# Patient Record
Sex: FEMALE | Race: WHITE | Employment: FULL TIME | URBAN - METROPOLITAN AREA
[De-identification: names, ages, dates, MRNs, and addresses within clinical notes are randomized per-mention and may not be internally consistent; named-entity substitution may affect disease eponyms.]

---

## 2017-11-15 ENCOUNTER — ALLSCRIPTS OFFICE VISIT (OUTPATIENT)
Dept: OTHER | Facility: OTHER | Age: 62
End: 2017-11-15

## 2017-11-15 ENCOUNTER — GENERIC CONVERSION - ENCOUNTER (OUTPATIENT)
Dept: OTHER | Facility: OTHER | Age: 62
End: 2017-11-15

## 2017-11-16 NOTE — PROGRESS NOTES
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)   2  Screening for skin condition (V82 0) (Z13 89)   3  Seborrheic keratosis (702 19) (L82 1)   4  Multiple excoriations (919 8) (T07  Levi Phan)    Plan     · Wound care as instructed ; Status:Complete;   Done: [id number]    Discussion/Summary  Discussion Summary- [facility]:  Assessment #1: Notalgia paresthetica  Care Plan:  We again discussed this concept no treatment indicated  Assessment #2: Multiple excoriations  Care Plan:  Nothing real remarkable for discussed the use moisturizes mild soaps avoidance of irritants nothing of concern noted except the area on the left breast if this does not resolve would have the gynecologist make sure nothing else is going on  Assessment #3: Seborrheic keratosis  Care Plan:  Patient reassured these are normal growths we acquire with age no treatment needed  Assessment #4: Screening for dermatologic disorders  Care Plan:  Nothing else of concern noted exam follow-up in a year  Chief Complaint  Chief Complaint Free Text Note Form: One year full body evaluation, with concerns of what appears to the patient as spider bites as she indicated once she scratches them they bleed  History of Present Illness  HPI: 58-year-old female presents for overall skin check numerous different cutaneous concerns including areas on her hand and back and foot that itches also in the nape of the neck  Also concerned regarding a little spot she noted on her left breast that seems to be getting better  Patient was concerned regarding several spots that she may have scratched very gently a rubbed in the started bleeding  We also discussed the itching on her back a couple years ago but she does not recall this      Review of Systems  [facility] [address] [place] [address] [address] Patient:  Constitutional: Denies constitutional symptoms  Eyes: Denies eye symptoms  ENT:  denies ear symptoms, nasal symptoms, mouth or throat symptoms  Cardiovascular: Denies cardiovascular symptoms  Respiratory: Denies respiratory symptoms  Gastrointestinal: Denies gastrointestinal symptoms  Musculoskeletal: Denies musculoskeletal symptoms  Integumentary: Denies skin, hair and nail symptoms  Neurological: Denies neurologic symptoms  Psychiatric: Denies psychiatric symptoms  Endocrine: Denies endocrine symptoms  Hematologic/Lymphatic: Denies hematologic symptoms  Active Problems    1  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)   2  Screening for skin condition (V82 0) (Z13 89)   3  Seborrheic keratosis (702 19) (L82 1)    Past Medical History  Past Medical History Reviewed- Derm:   The past medical history was reviewed  Surgical History  Surgical History Reviewed [facility]- Derm:   Surgical History reviewed      Family History  Family History Reviewed- Derm:   Family History was reviewed      Social History     · Former smoker (T17 27) (D70 213)  Social History Reviewed [facility]- Derm: The social history was reviewed      Current Meds   1  Calcium 500 MG CAPS; Therapy: (Recorded:[id number]) to Recorded   2  Risedronate Sodium TABS; Therapy: (Recorded:[id number]) to Recorded   3  Vitamin D3 CAPS; Therapy: (Recorded:[id number]) to Recorded  Medication List Reviewed: The medication list was reviewed and updated today  Allergies    1  No Known Drug Allergies    Physical Exam   Constitutional  General appearance: Appears healthy and well developed  Lymphatic  No visible disturbance  Musculoskeletal  Digits and nails: No clubbing, cyanosis or edema  Cutaneous and nail exam normal    Skin  Scalp skin texture and hair distribution: Normal skin texture on scalp, normal hair distribution  Head: Normal turgor, no rashes, no lesions  Neck: Abnormal    Chest: Normal turgor, no rashes, no lesions  Abdomen: Normal turgor, no rashes, no lesions  Back: Normal turgor, no rashes, no lesions  Right upper extremity: Normal turgor, no rashes, no lesions  Left upper extremity: Normal turgor, no rashes, no lesions  Right lower extremity: Normal turgor, no rashes, no lesions  Left lower extremity: Normal turgor, no rashes, no lesions  Neuro/Psych  Alert and oriented x 3  Displays comfort and cooperation during encounterl  Affect is normal    Finding Small crusted area noted on the right dorsum of the foot also on the right hand no rash noted on the back nape of the neck some scaling erythema noted also slightly inflammatory area noted on the left breast normal keratotic papules with greasy stuck on appearance nothing else remarkable noted on exam       Future Appointments    Date/Time Provider Specialty Site   11/19/2018 08:45 AM SINDI Akbar   Dermatology [facility]       Signatures   Electronically signed by : SINDI Short ; Nov 15 2017  9:05AM EST                       (Author)

## 2018-09-19 ENCOUNTER — HOSPITAL ENCOUNTER (EMERGENCY)
Facility: HOSPITAL | Age: 63
Discharge: HOME | End: 2018-09-19
Attending: EMERGENCY MEDICINE | Admitting: EMERGENCY MEDICINE
Payer: COMMERCIAL

## 2018-09-19 VITALS
SYSTOLIC BLOOD PRESSURE: 148 MMHG | OXYGEN SATURATION: 97 % | DIASTOLIC BLOOD PRESSURE: 92 MMHG | HEIGHT: 63 IN | HEART RATE: 92 BPM | WEIGHT: 115 LBS | BODY MASS INDEX: 20.38 KG/M2 | RESPIRATION RATE: 19 BRPM

## 2018-09-19 DIAGNOSIS — L73.9 FOLLICULITIS: Primary | ICD-10-CM

## 2018-09-19 LAB
BACTERIA UR QL AUTO: ABNORMAL /HPF
BILIRUB UR QL STRIP: NEGATIVE
CLARITY UR: CLEAR
COLOR UR: YELLOW
GLUCOSE UR STRIP-MCNC: NEGATIVE MG/DL
HGB UR QL STRIP.AUTO: ABNORMAL
KETONES UR STRIP-MCNC: ABNORMAL MG/DL
LEUKOCYTE ESTERASE UR QL STRIP: ABNORMAL
NITRITE UR QL STRIP: NEGATIVE
NON-SQ EPI CELLS URNS QL MICRO: ABNORMAL /HPF
PH UR STRIP.AUTO: 5.5 [PH] (ref 4.5–8)
PROT UR STRIP-MCNC: NEGATIVE MG/DL
RBC #/AREA URNS AUTO: ABNORMAL /HPF
SP GR UR STRIP.AUTO: 1.02 (ref 1–1.03)
UROBILINOGEN UR QL STRIP.AUTO: 0.2 E.U./DL
WBC #/AREA URNS AUTO: ABNORMAL /HPF

## 2018-09-19 PROCEDURE — 81001 URINALYSIS AUTO W/SCOPE: CPT | Performed by: EMERGENCY MEDICINE

## 2018-09-19 PROCEDURE — 99284 EMERGENCY DEPT VISIT MOD MDM: CPT

## 2018-09-19 NOTE — ED PROVIDER NOTES
History  Chief Complaint   Patient presents with    Vaginal Bleeding     Pt reports possible vaginal bleeding, in which pt noticed last night when using the restroom  76-year-old female presents today with concerns for possible vaginal bleeding which occurred last night  States she went to the bathroom and noticed there was some blood on her underwear  Unsure where it is coming from or what happened  Has no pain  Does report urinary frequency  He denies abdominal pain, bloating, or weight loss  History provided by:  Patient  Vaginal Bleeding   Quality:  Spotting (One spot of blood yesterday)  Severity:  Mild  Onset quality:  Sudden  Duration: Only occurred once yesterday  Progression:  Resolved  Chronicity:  New  Menstrual history:  Postmenopausal  Possible pregnancy: no    Context: spontaneously    Relieved by:  None tried  Worsened by:  Nothing  Ineffective treatments:  None tried  Associated symptoms: no abdominal pain, no back pain, no dizziness, no dysuria, no fever and no vaginal discharge        None       History reviewed  No pertinent past medical history  History reviewed  No pertinent surgical history  History reviewed  No pertinent family history  I have reviewed and agree with the history as documented  Social History   Substance Use Topics    Smoking status: Former Smoker    Smokeless tobacco: Never Used    Alcohol use 1 2 oz/week     2 Cans of beer per week      Comment: daily        Review of Systems   Constitutional: Negative for chills, fever and unexpected weight change  Gastrointestinal: Negative for abdominal pain  Genitourinary: Positive for vaginal bleeding  Negative for dysuria and vaginal discharge  Musculoskeletal: Negative for back pain  Skin: Negative for pallor, rash and wound  Neurological: Negative for dizziness and headaches  Physical Exam  Physical Exam   Constitutional: She is oriented to person, place, and time   She appears well-developed and well-nourished  HENT:   Head: Normocephalic and atraumatic  Eyes: EOM are normal  Pupils are equal, round, and reactive to light  Neck: Normal range of motion  Neck supple  Cardiovascular: Normal rate and regular rhythm  Pulmonary/Chest: Effort normal and breath sounds normal    Abdominal: There is tenderness  Genitourinary: Vagina normal and uterus normal        Pelvic exam was performed with patient prone  Uterus is not enlarged  Right adnexum displays no mass, no tenderness and no fullness  Left adnexum displays no mass, no tenderness and no fullness  No erythema, tenderness or bleeding in the vagina  No foreign body in the vagina  No signs of injury around the vagina  No vaginal discharge found  Genitourinary Comments: Small folliculitis left labia majora  Patient states she popped it yesterday  Neurological: She is alert and oriented to person, place, and time  Skin: Skin is warm and dry  Capillary refill takes less than 2 seconds  Psychiatric: She has a normal mood and affect  Nursing note and vitals reviewed        Vital Signs  ED Triage Vitals [09/19/18 1812]   Temp Pulse Respirations Blood Pressure SpO2   -- 92 19 148/92 97 %      Temp src Heart Rate Source Patient Position - Orthostatic VS BP Location FiO2 (%)   -- Monitor Sitting Right arm --      Pain Score       No Pain           Vitals:    09/19/18 1812   BP: 148/92   Pulse: 92   Patient Position - Orthostatic VS: Sitting       Visual Acuity      ED Medications  Medications - No data to display    Diagnostic Studies  Results Reviewed     Procedure Component Value Units Date/Time    Urine Microscopic [[id number]]  (Abnormal) Collected:  09/19/18 1920    Lab Status:  Final result Specimen:  Urine from Urine, Clean Catch Updated:  09/19/18 1931     RBC, UA 0-1 (A) /hpf      WBC, UA 1-2 (A) /hpf      Epithelial Cells Occasional /hpf      Bacteria, UA None Seen /hpf     UA w Reflex to Microscopic [[id number]] (Abnormal) Collected:  09/19/18 1920    Lab Status:  Final result Specimen:  Urine from Urine, Clean Catch Updated:  09/19/18 1925     Color, UA Yellow     Clarity, UA Clear     Specific Gravity, UA 1 025     pH, UA 5 5     Leukocytes, UA Trace (A)     Nitrite, UA Negative     Protein, UA Negative mg/dl      Glucose, UA Negative mg/dl      Ketones, UA 15 (1+) (A) mg/dl      Urobilinogen, UA 0 2 E U /dl      Bilirubin, UA Negative     Blood, UA Small (A)                 No orders to display              Procedures  Procedures       Phone Contacts  ED Phone Contact    ED Course                               MDM  Number of Diagnoses or Management Options  Folliculitis: new and requires workup  Diagnosis management comments: Chief complaint is likely related to a single folliculitis on the left labia majora that the patient popped  Internal pelvic exam is entirely normal  Urine showed trace leukocytes and trace blood  Will send for culture  Recommend follow-up with OBGYN and PCP as an outpatient  Amount and/or Complexity of Data Reviewed  Clinical lab tests: ordered and reviewed    Risk of Complications, Morbidity, and/or Mortality  Presenting problems: moderate  Diagnostic procedures: low  Management options: low    Patient Progress  Patient progress: stable    CritCare Time    Disposition  Final diagnoses: Folliculitis     Time reflects when diagnosis was documented in both MDM as applicable and the Disposition within this note     Time User Action Codes Description Comment    9/19/2018  7:47 PM oCrry Coats Add [T21 1] Folliculitis       ED Disposition     ED Disposition Condition Comment    Discharge  Hansel Jovon discharge to home/self care      Condition at discharge: Stable        Follow-up Information     Follow up With Specialties Details Why Keesha Bess MD Obstetrics and Gynecology, Obstetrics, Gynecology Schedule an appointment as soon as possible for a visit  [address] [place] [address] [facility]  [address] [place] [address] Patricia Licea MD Internal Medicine Call to get a family doctor [address]  [phone]            There are no discharge medications for this patient  No discharge procedures on file      ED Provider  Electronically Signed by           Stacey Pope DO  09/19/18 2009

## 2018-09-19 NOTE — DISCHARGE INSTRUCTIONS
Folliculitis   WHAT YOU NEED TO KNOW:   Folliculitis is inflammation of your hair follicles  A hair follicle is a sac under your skin  Your hair grows out of the follicle  Folliculitis is caused by bacteria or fungus, most commonly a germ called Staph  Folliculitis can occur anywhere you have hair  DISCHARGE INSTRUCTIONS:   Medicines:   · Antibiotics: This medicine is given to fight or prevent an infection caused by bacteria  It may be given as an ointment that you apply to your skin or as a pill  Always take your antibiotics exactly as ordered by your healthcare provider  Never save antibiotics or take leftover antibiotics that were given to you for another illness  · Antifungal medicine: This medicine helps kill fungus that may be causing your folliculitis  It may be given as an cream that you apply to your skin or as a pill  · NSAIDs , such as ibuprofen, help decrease swelling, pain, and fever  This medicine is available with or without a doctor's order  NSAIDs can cause stomach bleeding or kidney problems in certain people  If you take blood thinner medicine, always ask if NSAIDs are safe for you  Always read the medicine label and follow directions  Do not give these medicines to children under 10months of age without direction from your child's healthcare provider  · Antihistamines: This medicine may be given to help decrease itching  · Take your medicine as directed  Contact your healthcare provider if you think your medicine is not helping or if you have side effects  Tell him of her if you are allergic to any medicine  Keep a list of the medicines, vitamins, and herbs you take  Include the amounts, and when and why you take them  Bring the list or the pill bottles to follow-up visits  Carry your medicine list with you in case of an emergency    Follow up with your healthcare provider or dermatologist as directed:  Write down your questions so you remember to ask them during your visits  Manage folliculitis:   · Use warm compresses:  Wet a washcloth with warm water and apply it to the infected skin area to help decrease pain and swelling  Warm compresses may also help drain pus and improve healing  · Clean the area:  Use antibacterial soap to wash the affected area  Change your washcloths and towels every day  · Avoid shaving the area: If possible, do not shave areas that have folliculitis  If you must shave, use an electric razor or new blade every time you shave  Prevent folliculitis:   · Do not share personal items:  Do not share towels, soap, or any personal items with other people  · Do not wear tight clothing:  Do not wear tight-fitting clothes that rub against and irritate your skin  · Treat skin injuries right away:  Treat injuries such as cuts and scrapes right away  Wash them with warm, soapy water, and cover the area to prevent infection  Contact your healthcare provider or dermatologist if:  · You have a fever  · You have foul-smelling pus coming from the bumps on your skin  · Your rash is spreading  · You have questions or concerns about your condition or care  Return to the emergency department if:  · You develop large areas of red, warm, tender skin around the folliculitis  · You develop boils  © 2017 [address]  Information is for End User's use only and may not be sold, redistributed or otherwise used for commercial purposes  All illustrations and images included in CareNotes® are the copyrighted property of A D A M , Inc  or [employer]  The above information is an  only  It is not intended as medical advice for individual conditions or treatments  Talk to your doctor, nurse or pharmacist before following any medical regimen to see if it is safe and effective for you

## 2019-02-11 ENCOUNTER — TELEPHONE (OUTPATIENT)
Dept: GASTROENTEROLOGY | Facility: CLINIC | Age: 64
End: 2019-02-11

## 2019-02-11 NOTE — TELEPHONE ENCOUNTER
Dr Swati Becerra, Patient called to check when her last colonoscopy was performed    Please call [phone]

## 2019-03-01 ENCOUNTER — OFFICE VISIT (OUTPATIENT)
Dept: GASTROENTEROLOGY | Facility: CLINIC | Age: 64
End: 2019-03-01
Payer: COMMERCIAL

## 2019-03-01 ENCOUNTER — TELEPHONE (OUTPATIENT)
Dept: GASTROENTEROLOGY | Facility: CLINIC | Age: 64
End: 2019-03-01

## 2019-03-01 VITALS
DIASTOLIC BLOOD PRESSURE: 78 MMHG | HEART RATE: 75 BPM | SYSTOLIC BLOOD PRESSURE: 132 MMHG | BODY MASS INDEX: 21.08 KG/M2 | WEIGHT: 119 LBS

## 2019-03-01 DIAGNOSIS — Z86.010 HISTORY OF COLONIC POLYPS: ICD-10-CM

## 2019-03-01 DIAGNOSIS — R19.5 NONSPECIFIC ABNORMAL FINDING IN STOOL CONTENTS: Primary | ICD-10-CM

## 2019-03-01 PROCEDURE — 99214 OFFICE O/P EST MOD 30 MIN: CPT | Performed by: INTERNAL MEDICINE

## 2019-03-01 RX ORDER — BIOTIN 1 MG
TABLET ORAL
COMMUNITY

## 2019-03-01 RX ORDER — PHENOL 1.4 %
600 AEROSOL, SPRAY (ML) MUCOUS MEMBRANE 2 TIMES DAILY WITH MEALS
COMMUNITY

## 2019-03-01 RX ORDER — GLUC/MSM/COLGN2/HYAL/ANTIARTH3 375-375-20
1 TABLET ORAL
COMMUNITY
End: 2019-03-01

## 2019-03-01 RX ORDER — FOLIC ACID 1 MG/1
TABLET ORAL DAILY
COMMUNITY

## 2019-03-01 NOTE — PROGRESS NOTES
[facility] - Outpatient Follow-up Note  Eliseo Smith 58 y o  female MRN: [id number]  Encounter: [id number]          ASSESSMENT AND PLAN:      1  Nonspecific abnormal finding in stool contents  - It was explained to her today that mucus in the stool is a normal variant for most patients  2  History of colonic polyps  - Will start high fiber diet  - Will start fiber supplement  - Will start daily probiotic  - Will hold on colonoscopy for now; she is due for repeat colonoscopy 2020    ______________________________________________________________________    SUBJECTIVE:    58year old female who is here with mucus in her stool  She reports that this has been present for the past several weeks  She reports that her stool consistency has not been her "normal"  She reports that she has not changed her diet  She denies melena or RB  Her weight has been stable  She had a colonoscopy in 2017 and a tubular adenoma removed  She denies abdominal pain  She denies nausea or vomiting  REVIEW OF SYSTEMS IS OTHERWISE NEGATIVE        Historical Information   Past Medical History:   Diagnosis Date    Medical history reviewed with no changes      Past Surgical History:   Procedure Laterality Date    COLONOSCOPY      2017     Social History   Social History     Substance and Sexual Activity   Alcohol Use Yes    Alcohol/week: 1 2 oz    Types: 2 Cans of beer per week    Comment: daily     Social History     Substance and Sexual Activity   Drug Use No     Social History     Tobacco Use   Smoking Status Former Smoker   Smokeless Tobacco Never Used     Family History   Problem Relation Age of Onset    Dementia Mother     Diabetes Father        Meds/Allergies       Current Outpatient Medications:     calcium carbonate (OS-CARTER) 600 MG tablet    Cholecalciferol (VITAMIN D3) 1000 units CAPS    folic acid (FOLVITE) 1 mg tablet    No Known Allergies        Objective     Blood pressure 132/78, pulse 75, weight 54 kg (119 lb)  Body mass index is 21 08 kg/m²  PHYSICAL EXAM:      General Appearance:   Alert, cooperative, no distress   HEENT:   Normocephalic, atraumatic, anicteric      Neck:  Supple, symmetrical, trachea midline   Lungs:   Clear to auscultation bilaterally; no rales, rhonchi or wheezing; respirations unlabored    Heart[de-identified]   Regular rate and rhythm; no murmur, rub, or gallop  Abdomen:   Soft, non-tender, non-distended; normal bowel sounds; no masses, no organomegaly    Genitalia:   Deferred    Rectal:   Deferred    Extremities:  No cyanosis, clubbing or edema    Pulses:  2+ and symmetric    Skin:  No jaundice, rashes, or lesions    Lymph nodes:  No palpable cervical lymphadenopathy        Lab Results:   No visits with results within 1 Day(s) from this visit  Latest known visit with results is:   Admission on 09/19/2018, Discharged on 09/19/2018   Component Date Value    Color, UA 09/19/2018 Yellow     Clarity, UA 09/19/2018 Clear     Specific Gravity, UA 09/19/2018 1 025     pH, UA 09/19/2018 5 5     Leukocytes, UA 09/19/2018 Trace*    Nitrite, UA 09/19/2018 Negative     Protein, UA 09/19/2018 Negative     Glucose, UA 09/19/2018 Negative     Ketones, UA 09/19/2018 15 (1+)*    Urobilinogen, UA 09/19/2018 0 2     Bilirubin, UA 09/19/2018 Negative     Blood, UA 09/19/2018 Small*    RBC, UA 09/19/2018 0-1*    WBC, UA 09/19/2018 1-2*    Epithelial Cells 09/19/2018 Occasional     Bacteria, UA 09/19/2018 None Seen          Radiology Results:   No results found

## 2019-03-01 NOTE — PATIENT INSTRUCTIONS
Colorectal Polyps   WHAT YOU NEED TO KNOW:   Colorectal polyps are small growths of tissue in the lining of the colon and rectum  Most polyps are hyperplastic polyps and are usually benign (noncancerous)  Certain types of polyps, called adenomatous polyps, may turn into cancer  DISCHARGE INSTRUCTIONS:   Follow up with your healthcare provider or gastroenterologist as directed: You may need to return for more tests, such as another colonoscopy  Write down your questions so you remember to ask them during your visits  Reduce your risk for colorectal polyps:   · Eat a variety of healthy foods:  Healthy foods include fruit, vegetables, whole-grain breads, low-fat dairy products, beans, lean meat, and fish  Ask if you need to be on a special diet  · Maintain a healthy weight:  Ask your healthcare provider if you need to lose weight and how much you need to lose  Ask for help with a weight loss program     · Exercise:  Begin to exercise slowly and do more as you get stronger  Talk with your healthcare provider before you start an exercise program      · Limit alcohol:  Your risk for polyps increases the more you drink  · Do not smoke: If you smoke, it is never too late to quit  Ask for information about how to stop  For support and more information:   · Michael Garcia ([facility])  [address], [place] [address]  Phone: 8- 210 - [phone]  Web Address: www digestive  niddk nih gov  Contact your healthcare provider or gastroenterologist if:   · You have a fever  · You have chills, a cough, or feel weak and achy  · You have abdominal pain that does not go away or gets worse after you take medicine  · Your abdomen is swollen  · You are losing weight without trying  · You have questions or concerns about your condition or care  Seek care immediately or call 911 if:   · You have sudden shortness of breath       · You have a fast heart rate, fast breathing, or are too dizzy to stand up  · You have severe abdominal pain  · You see blood in your bowel movement  © 2017 [address] [facility] Information is for End User's use only and may not be sold, redistributed or otherwise used for commercial purposes  All illustrations and images included in CareNotes® are the copyrighted property of A D A M , Inc  or [employer]  The above information is an  only  It is not intended as medical advice for individual conditions or treatments  Talk to your doctor, nurse or pharmacist before following any medical regimen to see if it is safe and effective for you

## 2019-03-11 ENCOUNTER — HOSPITAL ENCOUNTER (EMERGENCY)
Facility: HOSPITAL | Age: 64
Discharge: HOME | End: 2019-03-11
Attending: EMERGENCY MEDICINE
Payer: COMMERCIAL

## 2019-03-11 ENCOUNTER — APPOINTMENT (EMERGENCY)
Dept: CT IMAGING | Facility: HOSPITAL | Age: 64
End: 2019-03-11
Payer: COMMERCIAL

## 2019-03-11 ENCOUNTER — APPOINTMENT (EMERGENCY)
Dept: RADIOLOGY | Facility: HOSPITAL | Age: 64
End: 2019-03-11
Payer: COMMERCIAL

## 2019-03-11 VITALS
WEIGHT: 118 LBS | HEART RATE: 82 BPM | HEIGHT: 63 IN | OXYGEN SATURATION: 96 % | TEMPERATURE: 98.1 F | RESPIRATION RATE: 18 BRPM | DIASTOLIC BLOOD PRESSURE: 68 MMHG | SYSTOLIC BLOOD PRESSURE: 138 MMHG | BODY MASS INDEX: 20.91 KG/M2

## 2019-03-11 DIAGNOSIS — S16.1XXA STRAIN OF NECK MUSCLE, INITIAL ENCOUNTER: ICD-10-CM

## 2019-03-11 DIAGNOSIS — S46.811A STRAIN OF RIGHT TRAPEZIUS MUSCLE, INITIAL ENCOUNTER: Primary | ICD-10-CM

## 2019-03-11 DIAGNOSIS — S32.050A CLOSED COMPRESSION FRACTURE OF FIFTH LUMBAR VERTEBRA, INITIAL ENCOUNTER: ICD-10-CM

## 2019-03-11 DIAGNOSIS — S39.012A STRAIN OF LUMBAR REGION, INITIAL ENCOUNTER: ICD-10-CM
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
## 2019-03-11 PROCEDURE — 99284 EMERGENCY DEPT VISIT MOD MDM: CPT

## 2019-03-11 PROCEDURE — 72100 X-RAY EXAM L-S SPINE 2/3 VWS: CPT

## 2019-03-11 PROCEDURE — 72125 CT NECK SPINE W/O DYE: CPT
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
## 2019-03-12 ENCOUNTER — TELEPHONE (OUTPATIENT)
Dept: PHYSICAL THERAPY | Facility: OTHER | Age: 64
End: 2019-03-12

## 2019-03-12 NOTE — RESULT ENCOUNTER NOTE
Concern for L5 compression fracture although no midline tenderness during the examination  Will recommend follow-up with the comprehensive spine program for physical therapy
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
## 2019-03-15 ENCOUNTER — TELEPHONE (OUTPATIENT)
Dept: PHYSICAL THERAPY | Facility: OTHER | Age: 64
End: 2019-03-15
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
## 2019-03-17 NOTE — ED RE-EVALUATION NOTE
3/17/19 10:00  Return call from patient:  Made aware final reading lumbar x-ray shows possible L5 compression fracture  Chart reviewed: no midline tenderness during the examination  Recommend follow-up with the comprehensive spine program for further eval asap, may need physical therapy  Patient verbally understood states that she is planning to follow up with her PCP 1st due to likely needing a referral   She may also consider following up with [facility] as she is employed by them       Evan López PA-C  03/17/19 0258

## 2019-07-30 ENCOUNTER — OFFICE VISIT (OUTPATIENT)
Dept: DERMATOLOGY | Facility: CLINIC | Age: 64
End: 2019-07-30
Payer: COMMERCIAL

## 2019-07-30 DIAGNOSIS — Z13.89 SCREENING FOR SKIN CONDITION: ICD-10-CM

## 2019-07-30 DIAGNOSIS — L82.1 SEBORRHEIC KERATOSIS: Primary | ICD-10-CM

## 2019-07-30 PROCEDURE — 99213 OFFICE O/P EST LOW 20 MIN: CPT | Performed by: DERMATOLOGY

## 2019-07-30 NOTE — PROGRESS NOTES
[address]  [address] [facility]  [place] [facility] [address]  [phone]  [phone]     MRN: [id number] : 1955  Encounter: [id number]  Patient Information: Jane Barrera  Chief complaint:  Yearly checkup    History of present illness:  16-year-old female presents for overall checkup no specific concerns noted except some changes that have occurred on her skin since her last visit no specific lesion of concern except 1 on the left jawline  Past Medical History:   Diagnosis Date    Medical history reviewed with no changes      Past Surgical History:   Procedure Laterality Date    COLONOSCOPY      2017     Social History   Social History     Substance and Sexual Activity   Alcohol Use Yes    Alcohol/week: 2 0 standard drinks    Types: 2 Cans of beer per week    Comment: daily     Social History     Substance and Sexual Activity   Drug Use No     Social History     Tobacco Use   Smoking Status Former Smoker   Smokeless Tobacco Never Used     Family History   Problem Relation Age of Onset    Dementia Mother     Diabetes Father      Meds/Allergies   Allergies   Allergen Reactions    Alendronate      GI Sx she was subsequently put on Atelvia       Meds:  Prior to Admission medications    Medication Sig Start Date End Date Taking?  Authorizing Provider   calcium carbonate (OS-CARTER) 600 MG tablet Take 600 mg by mouth 2 (two) times a day with meals   Yes Historical Provider, MD   Cholecalciferol (VITAMIN D3) 1000 units CAPS Take by mouth   Yes Historical Provider, MD   folic acid (FOLVITE) 1 mg tablet Take by mouth daily   Yes Historical Provider, MD       Subjective:     Review of Systems:    General: negative for - chills, fatigue, fever,  weight gain or weight loss  Psychological: negative for - anxiety, behavioral disorder, concentration difficulties, decreased libido, depression, irritability, memory difficulties, mood swings, sleep disturbances or suicidal ideation  ENT: negative for - hearing difficulties , nasal congestion, nasal discharge, oral lesions, sinus pain, sneezing, sore throat  Allergy and Immunology: negative for - hives, insect bite sensitivity,  Hematological and Lymphatic: negative for - bleeding problems, blood clots,bruising, swollen lymph nodes  Endocrine: negative for - hair pattern changes, hot flashes, malaise/lethargy, mood swings, palpitations, polydipsia/polyuria, skin changes, temperature intolerance or unexpected weight change  Respiratory: negative for - cough, hemoptysis, orthopnea, shortness of breath, or wheezing  Cardiovascular: negative for - chest pain, dyspnea on exertion, edema,  Gastrointestinal: negative for - abdominal pain, nausea/vomiting  Genito-Urinary: negative for - dysuria, incontinence, irregular/heavy menses or urinary frequency/urgency  Musculoskeletal: negative for - gait disturbance, joint pain, joint stiffness, joint swelling, muscle pain, muscular weakness  Dermatological:  As in HPI  Neurological: negative for confusion, dizziness, headaches, impaired coordination/balance, memory loss, numbness/tingling, seizures, speech problems, tremors or weakness       Objective: There were no vitals taken for this visit  Physical Exam:    General Appearance:    Alert, cooperative, no distress   Head:    Normocephalic, without obvious abnormality, atraumatic           Skin:   A full skin exam was performed including scalp, head scalp, eyes, ears, nose, lips, neck, chest, axilla, abdomen, back, buttocks, bilateral upper extremities, bilateral lower extremities, hands, feet, fingers, toes, fingernails, and toenails normal keratotic papules greasy stuck on appearance and lentigines noted on left cheek does not appear irregular in shape or color nothing else remarkable noted exam       Assessment:     1  Seborrheic keratosis     2   Screening for skin condition           Plan:   Seborrheic Keratosis  Patient reasurred these are normal growths we acquire with age no treatment needed  Screening for Dermatologic Disorders: Nothing else of concern noted on complete exam follow up in 1 year       Xiomara Blum MD  7/30/2019,11:22 AM    Portions of the record may have been created with voice recognition software   Occasional wrong word or "sound a like" substitutions may have occurred due to the inherent limitations of voice recognition software   Read the chart carefully and recognize, using context, where substitutions have occurred

## 2019-10-25 ENCOUNTER — OFFICE VISIT (OUTPATIENT)
Dept: OBGYN CLINIC | Age: 64
End: 2019-10-25
Payer: COMMERCIAL

## 2019-10-25 VITALS
WEIGHT: 118 LBS | HEIGHT: 62 IN | SYSTOLIC BLOOD PRESSURE: 118 MMHG | DIASTOLIC BLOOD PRESSURE: 72 MMHG | BODY MASS INDEX: 21.71 KG/M2

## 2019-10-25 DIAGNOSIS — Z01.419 ENCOUNTER FOR GYNECOLOGICAL EXAMINATION WITHOUT ABNORMAL FINDING: Primary | ICD-10-CM

## 2019-10-25 DIAGNOSIS — Z78.0 ASYMPTOMATIC POSTMENOPAUSAL STATUS: ICD-10-CM

## 2019-10-25 DIAGNOSIS — Z13.820 SCREENING FOR OSTEOPOROSIS: ICD-10-CM

## 2019-10-25 DIAGNOSIS — Z12.31 ENCOUNTER FOR SCREENING MAMMOGRAM FOR MALIGNANT NEOPLASM OF BREAST: ICD-10-CM

## 2019-10-25 PROCEDURE — G0145 SCR C/V CYTO,THINLAYER,RESCR: HCPCS | Performed by: NURSE PRACTITIONER

## 2019-10-25 PROCEDURE — G0101 CA SCREEN;PELVIC/BREAST EXAM: HCPCS | Performed by: NURSE PRACTITIONER

## 2019-10-25 RX ORDER — BIOTIN 10000 MCG
CAPSULE ORAL
COMMUNITY
End: 2021-10-26 | Stop reason: ALTCHOICE

## 2019-10-25 NOTE — PROGRESS NOTES
Diagnoses and all orders for this visit:    Encounter for gynecological examination without abnormal finding  -     Liquid-based pap, screening    Asymptomatic postmenopausal status  -     Cancel: DXA bone density spine hip and pelvis; Future    Screening for osteoporosis  -     Cancel: DXA bone density spine hip and pelvis; Future    Encounter for screening mammogram for malignant neoplasm of breast  -     Mammo screening bilateral w 3d & cad; Future    Other orders  -     Biotin 10 MG CAPS; Take by mouth        Call as needed, encouraged calcium/vit D in her diet, call with any PMB, all questions answered  Given lube list       Pleasant 71 y o  postmenopausal female here for annual exam  She denies postmenopausal bleeding  Denies history of abnormal pap smears, last Pap NIL 2016, pap today but may agree to no more next yr  Denies vaginal issues  Denies pelvic pain  Denies postmenopausal issues  Not sexually active, has some dryness but not bothersome  Colonoscopy due soon-will call Dr Reina Barcenas office  DXA due  Past Medical History:   Diagnosis Date    Medical history reviewed with no changes      Past Surgical History:   Procedure Laterality Date    COLONOSCOPY      2017    THROAT SURGERY       Family History   Problem Relation Age of Onset    Dementia Mother     Diabetes Father     Breast cancer Cousin     Colon cancer Neg Hx     Ovarian cancer Neg Hx     Uterine cancer Neg Hx     Cervical cancer Neg Hx      Social History     Tobacco Use    Smoking status: Former Smoker    Smokeless tobacco: Never Used   Substance Use Topics    Alcohol use:  Yes     Alcohol/week: 2 0 standard drinks     Types: 2 Cans of beer per week     Frequency: 2-3 times a week    Drug use: No       Current Outpatient Medications:     Biotin 10 MG CAPS, Take by mouth, Disp: , Rfl:     calcium carbonate (OS-CARTER) 600 MG tablet, Take 600 mg by mouth 2 (two) times a day with meals, Disp: , Rfl:     Cholecalciferol (VITAMIN D3) 1000 units CAPS, Take by mouth, Disp: , Rfl:     folic acid (FOLVITE) 1 mg tablet, Take by mouth daily, Disp: , Rfl:   There are no active problems to display for this patient  Allergies   Allergen Reactions    Alendronate      GI Sx she was subsequently put on Atelvia       OB History    Para Term  AB Living   1 1 1     1   SAB TAB Ectopic Multiple Live Births           1      # Outcome Date GA Lbr Stuart/2nd Weight Sex Delivery Anes PTL Lv   1 Term              Works at [employer]:    10/25/19 1346   BP: 118/72   BP Location: Right arm   Patient Position: Sitting   Weight: 53 5 kg (118 lb)   Height: 5' 2" (1 575 m)     Body mass index is 21 58 kg/m²  Review of Systems   Constitutional: Negative for chills, fatigue, fever and unexpected weight change  Respiratory: Negative for shortness of breath  Gastrointestinal: Negative for anal bleeding, blood in stool, constipation and diarrhea  Genitourinary: Negative for difficulty urinating, dysuria and hematuria  Physical Exam   Constitutional: She appears well-developed and well-nourished  No distress  HENT: atraumatic, EOMI  Head: Normocephalic  Neck: Normal range of motion  Neck supple  Pulmonary: Effort normal   Breasts: bilateral without masses, skin changes or nipple discharge  Bilaterally soft and warm to touch  No areas of erythema or pain  Abdominal: Soft  Pelvic exam was performed with patient supine  No labial fusion  There is no rash, tenderness, lesion or injury on the right labia  There is no rash, tenderness, lesion or injury on the left labia  Urethral meatus does not show any tenderness, inflammation or discharge  Palpation of midline bladder without pain or discomfort  Uterus is not deviated, not enlarged, not fixed and not tender  Cervix exhibits no motion tenderness, no discharge  Atrophy and [place]  Right adnexum displays no mass, no tenderness and no fullness   Left adnexum displays no mass, no tenderness and no fullness  No erythema or tenderness in the vagina  No foreign body in the vagina  No signs of injury around the vagina or anus  Perineum without lesions, signs of injury, erythema or swelling  No vaginal discharge found  MODERATE VAGINAL ATROPHY  Lymphadenopathy:        Right: No inguinal adenopathy present  Left: No inguinal adenopathy present

## 2019-10-25 NOTE — PATIENT INSTRUCTIONS
Vaginal Atrophy   WHAT YOU NEED TO KNOW:   What is vaginal atrophy? Vaginal atrophy is a condition that causes thinning, drying, and inflammation of vaginal tissue  This condition is caused by decreased levels of estrogen (a female sex hormone)  Vaginal atrophy can increase your risk for vaginal and urinary tract infections  Vaginal atrophy can worsen over time if not treated  What causes or increases your risk of vaginal atrophy? · Menopause     · Medicines that lower your estrogen levels, such as those used to treat breast cancer, endometriosis, or fibroids    · Radiation to your pelvic area     · Surgery to remove the ovaries    · Breastfeeding  What are the signs and symptoms of vaginal atrophy? · Vaginal dryness, itching, and burning    · Vaginal discharge    · Pain or discomfort during sex    · Light bleeding after sex    · Burning during urination    · Frequent, sudden, strong urges to urinate    · Urinary incontinence (loss of control of your bladder)  How is vaginal atrophy diagnosed? Your healthcare provider will ask about your symptoms  A pelvic exam will be done to examine your vagina and cervix  Your healthcare provider will place a speculum into your vagina to open and examine it  A sample of discharge from your vagina may be collected and tested  A urine test may also be done  How is vaginal atrophy treated? · Over-the counter vaginal moisturizers  can help reduce dryness  Your healthcare provider may recommend that you use a vaginal moisturizer several times each week and during sex  Only use creams that are made for vaginal use  Do  not  use petroleum jelly  Lubricants can be used during sex to decrease pain and discomfort  · Estrogen  may help decrease dryness  It may also lower your risk of vaginal infections if you are going through menopause  It can also help to relieve urinary symptoms  Estrogen may be prescribed in the form of a cream, tablet, or ring   These medicines can be applied or inserted into the vagina  Estrogen can also be prescribed in the form of a pill  When should I contact my healthcare provider? · You have a foul-smelling odor coming from your vagina  · You have a thick, cheese-like discharge from your vagina  · You have itching, swelling, or redness in your vagina  · You have pain or burning when you urinate  · Your urine smells bad  · Your symptoms do not improve, or they get worse  · You have questions or concerns about your condition or care  CARE AGREEMENT:   You have the right to help plan your care  Learn about your health condition and how it may be treated  Discuss treatment options with your caregivers to decide what care you want to receive  You always have the right to refuse treatment  The above information is an  only  It is not intended as medical advice for individual conditions or treatments  Talk to your doctor, nurse or pharmacist before following any medical regimen to see if it is safe and effective for you  © 2017 [address] Information is for End User's use only and may not be sold, redistributed or otherwise used for commercial purposes  All illustrations and images included in CareNotes® are the copyrighted property of A D A M , Inc  or [employer] A [employer]

## 2019-11-01 LAB
LAB AP GYN PRIMARY INTERPRETATION: NORMAL
Lab: NORMAL

## 2020-05-20 ENCOUNTER — HOSPITAL ENCOUNTER (OUTPATIENT)
Dept: MAMMOGRAPHY | Facility: CLINIC | Age: 65
Discharge: HOME | End: 2020-05-20
Payer: COMMERCIAL

## 2020-05-20 VITALS — BODY MASS INDEX: 21.71 KG/M2 | HEIGHT: 62 IN | WEIGHT: 118 LBS

## 2020-05-20 DIAGNOSIS — Z12.31 ENCOUNTER FOR SCREENING MAMMOGRAM FOR MALIGNANT NEOPLASM OF BREAST: ICD-10-CM

## 2020-05-20 PROCEDURE — 77067 SCR MAMMO BI INCL CAD: CPT

## 2020-05-20 PROCEDURE — 77063 BREAST TOMOSYNTHESIS BI: CPT

## 2020-06-02 ENCOUNTER — DOCUMENTATION (OUTPATIENT)
Dept: GASTROENTEROLOGY | Facility: CLINIC | Age: 65
End: 2020-06-02

## 2020-06-08 ENCOUNTER — TELEPHONE (OUTPATIENT)
Dept: GASTROENTEROLOGY | Facility: CLINIC | Age: 65
End: 2020-06-08

## 2020-06-25 ENCOUNTER — OFFICE VISIT (OUTPATIENT)
Dept: GASTROENTEROLOGY | Facility: CLINIC | Age: 65
End: 2020-06-25
Payer: COMMERCIAL

## 2020-06-25 VITALS
WEIGHT: 112.8 LBS | SYSTOLIC BLOOD PRESSURE: 118 MMHG | DIASTOLIC BLOOD PRESSURE: 64 MMHG | HEART RATE: 80 BPM | BODY MASS INDEX: 20.76 KG/M2 | HEIGHT: 62 IN

## 2020-06-25 DIAGNOSIS — Z20.822 ENCOUNTER FOR LABORATORY TESTING FOR COVID-19 VIRUS: ICD-10-CM

## 2020-06-25 DIAGNOSIS — Z86.010 HISTORY OF COLON POLYPS: Primary | ICD-10-CM

## 2020-06-25 PROCEDURE — 99214 OFFICE O/P EST MOD 30 MIN: CPT | Performed by: INTERNAL MEDICINE

## 2020-06-29 ENCOUNTER — TELEPHONE (OUTPATIENT)
Dept: GASTROENTEROLOGY | Facility: CLINIC | Age: 65
End: 2020-06-29

## 2020-07-09 ENCOUNTER — TELEPHONE (OUTPATIENT)
Dept: GASTROENTEROLOGY | Facility: CLINIC | Age: 65
End: 2020-07-09

## 2020-07-09 DIAGNOSIS — Z20.822 ENCOUNTER FOR LABORATORY TESTING FOR COVID-19 VIRUS: ICD-10-CM

## 2020-07-09 PROCEDURE — U0003 INFECTIOUS AGENT DETECTION BY NUCLEIC ACID (DNA OR RNA); SEVERE ACUTE RESPIRATORY SYNDROME CORONAVIRUS 2 (SARS-COV-2) (CORONAVIRUS DISEASE [COVID-19]), AMPLIFIED PROBE TECHNIQUE, MAKING USE OF HIGH THROUGHPUT TECHNOLOGIES AS DESCRIBED BY CMS-2020-01-R: HCPCS

## 2020-07-09 NOTE — TELEPHONE ENCOUNTER
Patient scheduled for a colonoscopy 07/13     She needs to know when she is to go for her covid screening    She also has questions re the coding for the colonoscopy     Please phone [phone]

## 2020-07-10 LAB
INPATIENT: NORMAL
SARS-COV-2 RNA SPEC QL NAA+PROBE: NOT DETECTED

## 2020-07-13 ENCOUNTER — ANESTHESIA EVENT (OUTPATIENT)
Dept: GASTROENTEROLOGY | Facility: HOSPITAL | Age: 65
End: 2020-07-13

## 2020-07-13 ENCOUNTER — ANESTHESIA (OUTPATIENT)
Dept: GASTROENTEROLOGY | Facility: HOSPITAL | Age: 65
End: 2020-07-13

## 2020-07-13 ENCOUNTER — HOSPITAL ENCOUNTER (OUTPATIENT)
Dept: GASTROENTEROLOGY | Facility: HOSPITAL | Age: 65
Setting detail: OUTPATIENT SURGERY
Discharge: HOME | End: 2020-07-13
Attending: INTERNAL MEDICINE
Payer: COMMERCIAL

## 2020-07-13 VITALS
HEIGHT: 63 IN | RESPIRATION RATE: 18 BRPM | TEMPERATURE: 97.3 F | SYSTOLIC BLOOD PRESSURE: 105 MMHG | BODY MASS INDEX: 20 KG/M2 | HEART RATE: 70 BPM | DIASTOLIC BLOOD PRESSURE: 74 MMHG | WEIGHT: 112.88 LBS | OXYGEN SATURATION: 100 %

## 2020-07-13 DIAGNOSIS — Z86.010 HISTORY OF COLON POLYPS: ICD-10-CM

## 2020-07-13 PROCEDURE — 88305 TISSUE EXAM BY PATHOLOGIST: CPT | Performed by: PATHOLOGY

## 2020-07-13 PROCEDURE — 45385 COLONOSCOPY W/LESION REMOVAL: CPT | Performed by: INTERNAL MEDICINE

## 2020-07-13 RX ORDER — SODIUM CHLORIDE, SODIUM LACTATE, POTASSIUM CHLORIDE, CALCIUM CHLORIDE 600; 310; 30; 20 MG/100ML; MG/100ML; MG/100ML; MG/100ML
125 INJECTION, SOLUTION INTRAVENOUS CONTINUOUS
Status: DISCONTINUED | OUTPATIENT
Start: 2020-07-13 | End: 2020-07-17 | Stop reason: HOSPADM

## 2020-07-13 RX ORDER — LIDOCAINE HYDROCHLORIDE 10 MG/ML
INJECTION, SOLUTION EPIDURAL; INFILTRATION; INTRACAUDAL; PERINEURAL AS NEEDED
Status: DISCONTINUED | OUTPATIENT
Start: 2020-07-13 | End: 2020-07-13 | Stop reason: SURG

## 2020-07-13 RX ORDER — PROPOFOL 10 MG/ML
INJECTION, EMULSION INTRAVENOUS AS NEEDED
Status: DISCONTINUED | OUTPATIENT
Start: 2020-07-13 | End: 2020-07-13 | Stop reason: SURG

## 2020-07-13 RX ADMIN — PROPOFOL 40 MG: 10 INJECTION, EMULSION INTRAVENOUS at 10:24

## 2020-07-13 RX ADMIN — LIDOCAINE HYDROCHLORIDE 50 MG: 10 INJECTION, SOLUTION EPIDURAL; INFILTRATION; INTRACAUDAL; PERINEURAL at 10:14

## 2020-07-13 RX ADMIN — PROPOFOL 40 MG: 10 INJECTION, EMULSION INTRAVENOUS at 10:19

## 2020-07-13 RX ADMIN — SODIUM CHLORIDE, SODIUM LACTATE, POTASSIUM CHLORIDE, AND CALCIUM CHLORIDE: .6; .31; .03; .02 INJECTION, SOLUTION INTRAVENOUS at 10:03

## 2020-07-13 RX ADMIN — PROPOFOL 80 MG: 10 INJECTION, EMULSION INTRAVENOUS at 10:14

## 2020-07-13 RX ADMIN — PROPOFOL 40 MG: 10 INJECTION, EMULSION INTRAVENOUS at 10:17

## 2020-07-13 NOTE — ANESTHESIA PREPROCEDURE EVALUATION
Review of Systems/Medical History  Patient summary reviewed  Chart reviewed  No history of anesthetic complications     Cardiovascular  Exercise tolerance (METS): >4,     Pulmonary       GI/Hepatic            Endo/Other     GYN       Hematology   Musculoskeletal       Neurology   Psychology           Physical Exam    Airway    Mallampati score: I  TM Distance: >3 FB  Neck ROM: full     Dental   No notable dental hx     Cardiovascular      Pulmonary      Other Findings        Anesthesia Plan  ASA Score- 1     Anesthesia Type- IV sedation with anesthesia with ASA Monitors  Additional Monitors:   Airway Plan:         Plan Factors-    Induction- intravenous  Postoperative Plan-     Informed Consent- Anesthetic plan and risks discussed with patient  I personally reviewed this patient with the CRNA  Discussed and agreed on the Anesthesia Plan with the CRNA  Conrad Ferrell

## 2020-07-13 NOTE — H&P
History and Physical -  [facility]  Adarsh Thomas 76 y o  female MRN: [id number]      HPI: Adarsh Thomas is a 76y o  year old female who presents for history of colon polyps      REVIEW OF SYSTEMS: Per the HPI, and otherwise unremarkable  Historical Information   Past Medical History:   Diagnosis Date    Medical history reviewed with no changes      Past Surgical History:   Procedure Laterality Date    BREAST BIOPSY Left 2018    benign    COLONOSCOPY      2017    THROAT SURGERY       Social History   Social History     Substance and Sexual Activity   Alcohol Use Yes    Alcohol/week: 2 0 standard drinks    Types: 2 Cans of beer per week    Frequency: 2-3 times a week     Social History     Substance and Sexual Activity   Drug Use No     Social History     Tobacco Use   Smoking Status Former Smoker   Smokeless Tobacco Never Used     Family History   Problem Relation Age of Onset    Dementia Mother     Diabetes Father     Breast cancer Cousin 39    Breast cancer Cousin 39    Colon cancer Neg Hx     Ovarian cancer Neg Hx     Uterine cancer Neg Hx     Cervical cancer Neg Hx        Meds/Allergies       (Not in a hospital admission)    Allergies   Allergen Reactions    Alendronate      GI Sx she was subsequently put on Atelvia       Objective     not currently breastfeeding  PHYSICAL EXAM    Gen: NAD  CV: RRR  CHEST: Clear  ABD: soft, NT/ND  EXT: no edema      ASSESSMENT/PLAN:  This is a 76y o  year old female here for colonoscopy, and she is stable and optimized for her procedure

## 2020-07-13 NOTE — DISCHARGE INSTRUCTIONS
Colonoscopy   WHAT YOU NEED TO KNOW:   A colonoscopy is a procedure to examine the inside of your colon (intestine) with a scope  Polyps or tissue growths may have been removed during your colonoscopy  It is normal to feel bloated and to have some abdominal discomfort  You should be passing gas  If you have hemorrhoids or you had polyps removed, you may have a small amount of bleeding  DISCHARGE INSTRUCTIONS:   Seek care immediately if:   · You have a large amount of bright red blood in your bowel movements  · Your abdomen is hard and firm and you have severe pain  · You have sudden trouble breathing  Contact your healthcare provider if:   · You develop a rash or hives  · You have a fever within 24 hours of your procedure  · You have not had a bowel movement for 3 days after your procedure  · You have questions or concerns about your condition or care  Activity:   · Do not lift, strain, or run  for 3 days after your procedure  · Rest after your procedure  You have been given medicine to relax you  Do not  drive or make important decisions until the day after your procedure  Return to your normal activity as directed  · Relieve gas and discomfort from bloating  by lying on your right side with a heating pad on your abdomen  You may need to take short walks to help the gas move out  Eat small meals until bloating is relieved  If you had polyps removed: For 7 days after your procedure:  · Do not  take aspirin  · Do not  go on long car rides  Help prevent constipation:   · Eat a variety of healthy foods  Healthy foods include fruit, vegetables, whole-grain breads, low-fat dairy products, beans, lean meat, and fish  Ask if you need to be on a special diet  Your healthcare provider may recommend that you eat high-fiber foods such as cooked beans  Fiber helps you have regular bowel movements  · Drink liquids as directed    Adults should drink between 9 and 13 eight-ounce cups of liquid every day  Ask what amount is best for you  For most people, good liquids to drink are water, juice, and milk  · Exercise as directed  Talk to your healthcare provider about the best exercise plan for you  Exercise can help prevent constipation, decrease your blood pressure and improve your health  Follow up with your healthcare provider as directed:  Write down your questions so you remember to ask them during your visits  © 2017 [employer] Information is for End User's use only and may not be sold, redistributed or otherwise used for commercial purposes  All illustrations and images included in CareNotes® are the copyrighted property of [employer]  or [employer]  The above information is an  only  It is not intended as medical advice for individual conditions or treatments  Talk to your doctor, nurse or pharmacist before following any medical regimen to see if it is safe and effective for you

## 2020-07-13 NOTE — ANESTHESIA POSTPROCEDURE EVALUATION
Post-Op Assessment Note    CV Status:  Stable    Pain management: adequate     Mental Status:  Sleepy and arousable   Hydration Status:  Euvolemic   PONV Controlled:  Controlled   Airway Patency:  Patent   Post Op Vitals Reviewed: Yes      Staff: CRNA           /64 (07/13/20 1029)    Temp (!) 97 3 °F (36 3 °C) (07/13/20 1029)    Pulse 71 (07/13/20 1029)   Resp 14 (07/13/20 1029)    SpO2 100 % (07/13/20 1029)

## 2020-07-31 ENCOUNTER — TELEPHONE (OUTPATIENT)
Dept: DERMATOLOGY | Facility: CLINIC | Age: 65
End: 2020-07-31

## 2020-08-03 ENCOUNTER — OFFICE VISIT (OUTPATIENT)
Dept: DERMATOLOGY | Facility: CLINIC | Age: 65
End: 2020-08-03
Payer: COMMERCIAL

## 2020-08-03 VITALS — TEMPERATURE: 98.6 F

## 2020-08-03 DIAGNOSIS — L82.1 SEBORRHEIC KERATOSIS: ICD-10-CM

## 2020-08-03 DIAGNOSIS — Z13.89 SCREENING FOR SKIN CONDITION: ICD-10-CM

## 2020-08-03 DIAGNOSIS — L81.4 LENTIGO: Primary | ICD-10-CM

## 2020-08-03 PROCEDURE — 99213 OFFICE O/P EST LOW 20 MIN: CPT | Performed by: DERMATOLOGY

## 2020-08-03 NOTE — PATIENT INSTRUCTIONS
Lesion on forehead appears to be a freckle no treatment needed unless further changes noted  Seborrheic Keratosis  Patient reasurred these are normal growths we acquire with age no treatment needed    Screening for Dermatologic Disorders: Nothing else of concern noted on complete exam follow up in 1 year

## 2020-08-03 NOTE — PROGRESS NOTES
[facility] DERMATOLOGY  [facility]  [address] [address]  [phone]  [phone]     MRN: [id number] : 1955  Encounter: [id number]  Patient Information: Cesar Bowden  Chief complaint:  Yearly skin check    History of present illness:  76year old female presents for overall skin check concerned regarding lesion on left forehead previous history of lots of sun exposure no specific other changes of concerns noted  Past Medical History:   Diagnosis Date    Colon polyp     Medical history reviewed with no changes      Past Surgical History:   Procedure Laterality Date    BREAST BIOPSY Left 2018    benign    COLONOSCOPY      2017    THROAT SURGERY       Social History   Social History     Substance and Sexual Activity   Alcohol Use Yes    Alcohol/week: 2 0 standard drinks    Types: 2 Cans of beer per week    Frequency: 4 or more times a week     Social History     Substance and Sexual Activity   Drug Use No     Social History     Tobacco Use   Smoking Status Former Smoker   Smokeless Tobacco Never Used     Family History   Problem Relation Age of Onset    Dementia Mother     Diabetes Father     Breast cancer Cousin 39    Breast cancer Cousin 39    Colon cancer Neg Hx     Ovarian cancer Neg Hx     Uterine cancer Neg Hx     Cervical cancer Neg Hx      Meds/Allergies   Allergies   Allergen Reactions    Alendronate      GI Sx she was subsequently put on Atelvia       Meds:  Prior to Admission medications    Medication Sig Start Date End Date Taking?  Authorizing Provider   Biotin 10 MG CAPS Take by mouth   Yes Historical Provider, MD   calcium carbonate (OS-CARTER) 600 MG tablet Take 600 mg by mouth 2 (two) times a day with meals   Yes Historical Provider, MD   Cholecalciferol (VITAMIN D3) 1000 units CAPS Take by mouth   Yes Historical Provider, MD   folic acid (FOLVITE) 1 mg tablet Take by mouth daily   Yes Historical Provider, MD       Subjective: Review of Systems:    General: negative for - chills, fatigue, fever,  weight gain or weight loss  Psychological: negative for - anxiety, behavioral disorder, concentration difficulties, decreased libido, depression, irritability, memory difficulties, mood swings, sleep disturbances or suicidal ideation  ENT: negative for - hearing difficulties , nasal congestion, nasal discharge, oral lesions, sinus pain, sneezing, sore throat  Allergy and Immunology: negative for - hives, insect bite sensitivity,  Hematological and Lymphatic: negative for - bleeding problems, blood clots,bruising, swollen lymph nodes  Endocrine: negative for - hair pattern changes, hot flashes, malaise/lethargy, mood swings, palpitations, polydipsia/polyuria, skin changes, temperature intolerance or unexpected weight change  Respiratory: negative for - cough, hemoptysis, orthopnea, shortness of breath, or wheezing  Cardiovascular: negative for - chest pain, dyspnea on exertion, edema,  Gastrointestinal: negative for - abdominal pain, nausea/vomiting  Genito-Urinary: negative for - dysuria, incontinence, irregular/heavy menses or urinary frequency/urgency  Musculoskeletal: negative for - gait disturbance, joint pain, joint stiffness, joint swelling, muscle pain, muscular weakness  Dermatological:  As in HPI  Neurological: negative for confusion, dizziness, headaches, impaired coordination/balance, memory loss, numbness/tingling, seizures, speech problems, tremors or weakness       Objective:   Temp 98 6 °F (37 °C)     Physical Exam:    General Appearance:    Alert, cooperative, no distress   Head:    Normocephalic, without obvious abnormality, atraumatic           Skin:   A full skin exam was performed including scalp, head scalp, eyes, ears, nose, lips, neck, chest, axilla, abdomen, back, buttocks, bilateral upper extremities, bilateral lower extremities, hands, feet, fingers, toes, fingernails, and toenails slightly hyperpigmented macule the left upper forehead normal keratotic papules with greasy stuck appearance nothing else remarkable noted complete exam     Assessment:     1  Lentigo     2  Seborrheic keratosis     3  Screening for skin condition           Plan:   Lesion on forehead appears to be a freckle no treatment needed unless further changes noted  Seborrheic Keratosis  Patient reasurred these are normal growths we acquire with age no treatment needed  Screening for Dermatologic Disorders: Nothing else of concern noted on complete exam follow up in 1 year       Audrey Garcia MD  8/3/2020,12:13 PM    Portions of the record may have been created with voice recognition software   Occasional wrong word or "sound a like" substitutions may have occurred due to the inherent limitations of voice recognition software   Read the chart carefully and recognize, using context, where substitutions have occurred

## 2020-11-11 ENCOUNTER — OFFICE VISIT (OUTPATIENT)
Dept: INTERNAL MEDICINE CLINIC | Facility: CLINIC | Age: 65
End: 2020-11-11
Payer: COMMERCIAL

## 2020-11-11 VITALS
DIASTOLIC BLOOD PRESSURE: 80 MMHG | WEIGHT: 118 LBS | SYSTOLIC BLOOD PRESSURE: 138 MMHG | HEIGHT: 63 IN | TEMPERATURE: 96.9 F | HEART RATE: 82 BPM | BODY MASS INDEX: 20.91 KG/M2

## 2020-11-11 DIAGNOSIS — Z11.59 ENCOUNTER FOR HEPATITIS C SCREENING TEST FOR LOW RISK PATIENT: ICD-10-CM

## 2020-11-11 DIAGNOSIS — M81.0 AGE-RELATED OSTEOPOROSIS WITHOUT CURRENT PATHOLOGICAL FRACTURE: ICD-10-CM

## 2020-11-11 DIAGNOSIS — Z00.00 HEALTH CARE MAINTENANCE: ICD-10-CM

## 2020-11-11 DIAGNOSIS — E78.2 MIXED HYPERLIPIDEMIA: ICD-10-CM

## 2020-11-11 PROBLEM — Z12.39 BREAST SCREENING: Status: ACTIVE | Noted: 2020-11-11

## 2020-11-11 PROBLEM — Z12.11 COLON CANCER SCREENING: Status: ACTIVE | Noted: 2020-11-11

## 2020-11-11 PROCEDURE — 99203 OFFICE O/P NEW LOW 30 MIN: CPT | Performed by: INTERNAL MEDICINE

## 2020-11-11 PROCEDURE — 1125F AMNT PAIN NOTED PAIN PRSNT: CPT | Performed by: INTERNAL MEDICINE

## 2020-11-11 PROCEDURE — 1160F RVW MEDS BY RX/DR IN RCRD: CPT | Performed by: INTERNAL MEDICINE

## 2020-11-11 PROCEDURE — 3725F SCREEN DEPRESSION PERFORMED: CPT | Performed by: INTERNAL MEDICINE

## 2020-11-11 PROCEDURE — 1036F TOBACCO NON-USER: CPT | Performed by: INTERNAL MEDICINE

## 2020-11-11 PROCEDURE — 1170F FXNL STATUS ASSESSED: CPT | Performed by: INTERNAL MEDICINE

## 2020-11-11 PROCEDURE — 3008F BODY MASS INDEX DOCD: CPT | Performed by: INTERNAL MEDICINE

## 2020-11-11 PROCEDURE — G0438 PPPS, INITIAL VISIT: HCPCS | Performed by: INTERNAL MEDICINE

## 2020-11-13 ENCOUNTER — LAB (OUTPATIENT)
Dept: LAB | Facility: CLINIC | Age: 65
End: 2020-11-13
Payer: COMMERCIAL

## 2020-11-13 ENCOUNTER — TELEPHONE (OUTPATIENT)
Dept: INTERNAL MEDICINE CLINIC | Facility: CLINIC | Age: 65
End: 2020-11-13

## 2020-11-13 DIAGNOSIS — R82.81 PYURIA: ICD-10-CM

## 2020-11-13 DIAGNOSIS — M81.0 AGE-RELATED OSTEOPOROSIS WITHOUT CURRENT PATHOLOGICAL FRACTURE: ICD-10-CM

## 2020-11-13 DIAGNOSIS — R31.29 MICROSCOPIC HEMATURIA: Primary | ICD-10-CM

## 2020-11-13 DIAGNOSIS — E78.2 MIXED HYPERLIPIDEMIA: ICD-10-CM

## 2020-11-13 DIAGNOSIS — Z11.59 ENCOUNTER FOR HEPATITIS C SCREENING TEST FOR LOW RISK PATIENT: ICD-10-CM

## 2020-11-13 LAB
25(OH)D3 SERPL-MCNC: 47.8 NG/ML (ref 30–100)
ALBUMIN SERPL BCP-MCNC: 3.9 G/DL (ref 3.5–5)
ALP SERPL-CCNC: 49 U/L (ref 46–116)
ALT SERPL W P-5'-P-CCNC: 27 U/L (ref 12–78)
ANION GAP SERPL CALCULATED.3IONS-SCNC: 5 MMOL/L (ref 4–13)
AST SERPL W P-5'-P-CCNC: 25 U/L (ref 5–45)
BACTERIA UR QL AUTO: ABNORMAL /HPF
BASOPHILS # BLD AUTO: 0.05 THOUSANDS/ΜL (ref 0–0.1)
BASOPHILS NFR BLD AUTO: 1 % (ref 0–1)
BILIRUB SERPL-MCNC: 0.67 MG/DL (ref 0.2–1)
BILIRUB UR QL STRIP: NEGATIVE
BUN SERPL-MCNC: 18 MG/DL (ref 5–25)
CALCIUM SERPL-MCNC: 9.9 MG/DL (ref 8.3–10.1)
CHLORIDE SERPL-SCNC: 109 MMOL/L (ref 100–108)
CHOLEST SERPL-MCNC: 227 MG/DL (ref 50–200)
CLARITY UR: CLEAR
CO2 SERPL-SCNC: 29 MMOL/L (ref 21–32)
COLOR UR: YELLOW
CREAT SERPL-MCNC: 0.96 MG/DL (ref 0.6–1.3)
EOSINOPHIL # BLD AUTO: 0.29 THOUSAND/ΜL (ref 0–0.61)
EOSINOPHIL NFR BLD AUTO: 4 % (ref 0–6)
ERYTHROCYTE [DISTWIDTH] IN BLOOD BY AUTOMATED COUNT: 13.2 % (ref 11.6–15.1)
GFR SERPL CREATININE-BSD FRML MDRD: 62 ML/MIN/1.73SQ M
GLUCOSE P FAST SERPL-MCNC: 90 MG/DL (ref 65–99)
GLUCOSE UR STRIP-MCNC: NEGATIVE MG/DL
HCT VFR BLD AUTO: 43.8 % (ref 34.8–46.1)
HCV AB SER QL: NORMAL
HDLC SERPL-MCNC: 95 MG/DL
HGB BLD-MCNC: 14.1 G/DL (ref 11.5–15.4)
HGB UR QL STRIP.AUTO: ABNORMAL
HYALINE CASTS #/AREA URNS LPF: ABNORMAL /LPF
IMM GRANULOCYTES # BLD AUTO: 0.02 THOUSAND/UL (ref 0–0.2)
IMM GRANULOCYTES NFR BLD AUTO: 0 % (ref 0–2)
KETONES UR STRIP-MCNC: NEGATIVE MG/DL
LDLC SERPL CALC-MCNC: 113 MG/DL (ref 0–100)
LEUKOCYTE ESTERASE UR QL STRIP: ABNORMAL
LYMPHOCYTES # BLD AUTO: 1.85 THOUSANDS/ΜL (ref 0.6–4.47)
LYMPHOCYTES NFR BLD AUTO: 24 % (ref 14–44)
MCH RBC QN AUTO: 29.5 PG (ref 26.8–34.3)
MCHC RBC AUTO-ENTMCNC: 32.2 G/DL (ref 31.4–37.4)
MCV RBC AUTO: 92 FL (ref 82–98)
MONOCYTES # BLD AUTO: 0.53 THOUSAND/ΜL (ref 0.17–1.22)
MONOCYTES NFR BLD AUTO: 7 % (ref 4–12)
NEUTROPHILS # BLD AUTO: 4.84 THOUSANDS/ΜL (ref 1.85–7.62)
NEUTS SEG NFR BLD AUTO: 64 % (ref 43–75)
NITRITE UR QL STRIP: NEGATIVE
NON-SQ EPI CELLS URNS QL MICRO: ABNORMAL /HPF
NRBC BLD AUTO-RTO: 0 /100 WBCS
PH UR STRIP.AUTO: 7 [PH]
PLATELET # BLD AUTO: 291 THOUSANDS/UL (ref 149–390)
PMV BLD AUTO: 9.8 FL (ref 8.9–12.7)
POTASSIUM SERPL-SCNC: 4.9 MMOL/L (ref 3.5–5.3)
PROT SERPL-MCNC: 7.8 G/DL (ref 6.4–8.2)
PROT UR STRIP-MCNC: NEGATIVE MG/DL
RBC # BLD AUTO: 4.78 MILLION/UL (ref 3.81–5.12)
RBC #/AREA URNS AUTO: ABNORMAL /HPF
SODIUM SERPL-SCNC: 143 MMOL/L (ref 136–145)
SP GR UR STRIP.AUTO: 1.02 (ref 1–1.03)
TRIGL SERPL-MCNC: 95 MG/DL
TSH SERPL DL<=0.05 MIU/L-ACNC: 2.35 UIU/ML (ref 0.36–3.74)
UROBILINOGEN UR QL STRIP.AUTO: 0.2 E.U./DL
WBC # BLD AUTO: 7.58 THOUSAND/UL (ref 4.31–10.16)
WBC #/AREA URNS AUTO: ABNORMAL /HPF

## 2020-11-13 PROCEDURE — 36415 COLL VENOUS BLD VENIPUNCTURE: CPT

## 2020-11-13 PROCEDURE — 82306 VITAMIN D 25 HYDROXY: CPT

## 2020-11-13 PROCEDURE — 86803 HEPATITIS C AB TEST: CPT

## 2020-11-13 PROCEDURE — 81001 URINALYSIS AUTO W/SCOPE: CPT | Performed by: INTERNAL MEDICINE

## 2020-11-13 PROCEDURE — 80053 COMPREHEN METABOLIC PANEL: CPT

## 2020-11-13 PROCEDURE — 85025 COMPLETE CBC W/AUTO DIFF WBC: CPT

## 2020-11-13 PROCEDURE — 80061 LIPID PANEL: CPT

## 2020-11-13 PROCEDURE — 84443 ASSAY THYROID STIM HORMONE: CPT

## 2020-11-16 ENCOUNTER — TELEPHONE (OUTPATIENT)
Dept: UROLOGY | Facility: MEDICAL CENTER | Age: 65
End: 2020-11-16

## 2020-11-20 ENCOUNTER — TELEPHONE (OUTPATIENT)
Dept: UROLOGY | Facility: CLINIC | Age: 65
End: 2020-11-20

## 2020-11-24 ENCOUNTER — HOSPITAL ENCOUNTER (OUTPATIENT)
Dept: ULTRASOUND IMAGING | Facility: CLINIC | Age: 65
Discharge: HOME | End: 2020-11-24
Payer: COMMERCIAL

## 2020-11-24 DIAGNOSIS — R82.81 PYURIA: ICD-10-CM

## 2020-11-24 DIAGNOSIS — R31.29 MICROSCOPIC HEMATURIA: ICD-10-CM

## 2020-11-24 PROCEDURE — 76770 US EXAM ABDO BACK WALL COMP: CPT

## 2021-01-28 ENCOUNTER — TELEPHONE (OUTPATIENT)
Dept: UROLOGY | Facility: CLINIC | Age: 66
End: 2021-01-28

## 2021-01-28 ENCOUNTER — CONSULT (OUTPATIENT)
Dept: UROLOGY | Facility: CLINIC | Age: 66
End: 2021-01-28
Payer: COMMERCIAL

## 2021-01-28 VITALS
SYSTOLIC BLOOD PRESSURE: 118 MMHG | HEIGHT: 63 IN | WEIGHT: 119 LBS | BODY MASS INDEX: 21.09 KG/M2 | DIASTOLIC BLOOD PRESSURE: 80 MMHG

## 2021-01-28 DIAGNOSIS — R31.29 MICROSCOPIC HEMATURIA: ICD-10-CM

## 2021-01-28 DIAGNOSIS — R82.81 PYURIA: ICD-10-CM

## 2021-01-28 DIAGNOSIS — R31.29 OTHER MICROSCOPIC HEMATURIA: Primary | ICD-10-CM

## 2021-01-28 DIAGNOSIS — K83.8 DILATED BILE DUCT: ICD-10-CM

## 2021-01-28 LAB
SL AMB  POCT GLUCOSE, UA: NORMAL
SL AMB LEUKOCYTE ESTERASE,UA: NORMAL
SL AMB POCT BILIRUBIN,UA: NORMAL
SL AMB POCT BLOOD,UA: NORMAL
SL AMB POCT CLARITY,UA: CLEAR
SL AMB POCT COLOR,UA: YELLOW
SL AMB POCT KETONES,UA: NORMAL
SL AMB POCT NITRITE,UA: NORMAL
SL AMB POCT PH,UA: 5
SL AMB POCT SPECIFIC GRAVITY,UA: 1.01
SL AMB POCT URINE PROTEIN: NORMAL
SL AMB POCT UROBILINOGEN: NORMAL

## 2021-01-28 PROCEDURE — 81002 URINALYSIS NONAUTO W/O SCOPE: CPT | Performed by: UROLOGY

## 2021-01-28 PROCEDURE — 3008F BODY MASS INDEX DOCD: CPT | Performed by: UROLOGY

## 2021-01-28 PROCEDURE — 1036F TOBACCO NON-USER: CPT | Performed by: UROLOGY

## 2021-01-28 PROCEDURE — 99204 OFFICE O/P NEW MOD 45 MIN: CPT | Performed by: UROLOGY

## 2021-01-28 NOTE — TELEPHONE ENCOUNTER
Patient called back and asked if the office can please leave a message on her voice mail at [phone]

## 2021-01-28 NOTE — TELEPHONE ENCOUNTER
Patient was wondering how many drops of blood were in her urine  Patient can be reached at [phone]     Thank you

## 2021-01-28 NOTE — PROGRESS NOTES
UROLOGY NEW CONSULT NOTE     CHIEF COMPLAINT   Nohemy Saravia is a 76 y o  female with a complaint of   Chief Complaint   Patient presents with    Microhematuria       History of Present Illness:     76 y o  female  New presents for microscopic hematuria  The patient reports a remote history of tobacco abuse of 30-40 pack years  She quit approximately 15 years ago  She denies any urinary symptoms of complete, no frequency urgency dysuria  She does have some vaginal dryness and occasional painful intercourse  Is no constipation  There is no family history of kidney or bladder cancer  Past Medical History:     Past Medical History:   Diagnosis Date    Colon polyp     Medical history reviewed with no changes        PAST SURGICAL HISTORY:     Past Surgical History:   Procedure Laterality Date    BREAST BIOPSY Left 2018    benign    COLONOSCOPY      2017    THROAT SURGERY         CURRENT MEDICATIONS:     Current Outpatient Medications   Medication Sig Dispense Refill    Biotin 10 MG CAPS Take by mouth      calcium carbonate (OS-CARTER) 600 MG tablet Take 600 mg by mouth 2 (two) times a day with meals      Cholecalciferol (VITAMIN D3) 1000 units CAPS Take by mouth      folic acid (FOLVITE) 1 mg tablet Take by mouth daily       No current facility-administered medications for this visit          ALLERGIES:     Allergies   Allergen Reactions    Alendronate      GI Sx she was subsequently put on Atelvia       SOCIAL HISTORY:     Social History     Socioeconomic History    Marital status:      Spouse name: None    Number of children: None    Years of education: None    Highest education level: None   Occupational History    None   Social Needs    Financial resource strain: None    Food insecurity     Worry: None     Inability: None    Transportation needs     Medical: None     Non-medical: None   Tobacco Use    Smoking status: Former Smoker    Smokeless tobacco: Never Used   Substance and Sexual Activity    Alcohol use: Yes     Alcohol/week: 14 0 standard drinks     Types: 14 Cans of beer per week     Frequency: 4 or more times a week     Drinks per session: 1 or 2    Drug use: No    Sexual activity: Not Currently     Birth control/protection: Post-menopausal   Lifestyle    Physical activity     Days per week: None     Minutes per session: None    Stress: None   Relationships    Social connections     Talks on phone: None     Gets together: None     Attends [employer] service: None     Active member of club or organization: None     Attends meetings of clubs or organizations: None     Relationship status: None    Intimate partner violence     Fear of current or ex partner: None     Emotionally abused: None     Physically abused: None     Forced sexual activity: None   Other Topics Concern    None   Social History Narrative    None       SOCIAL HISTORY:     Family History   Problem Relation Age of Onset    Dementia Mother     Diabetes Father     Breast cancer Cousin 39    Breast cancer Cousin 39    Colon cancer Neg Hx     Ovarian cancer Neg Hx     Uterine cancer Neg Hx     Cervical cancer Neg Hx        REVIEW OF SYSTEMS:     Review of Systems   Constitutional: Negative  Respiratory: Negative  Cardiovascular: Negative  Gastrointestinal: Negative  Genitourinary: Positive for dyspareunia  Negative for hematuria  Musculoskeletal: Negative  Skin: Negative  Psychiatric/Behavioral: Negative  PHYSICAL EXAM:     /80 (BP Location: Left arm, Patient Position: Sitting, Cuff Size: Adult)   Ht 5' 3" (1 6 m)   Wt 54 kg (119 lb)   LMP  (LMP Unknown)   BMI 21 08 kg/m²     General:  Healthy appearing female in no acute distress  They have a normal affect  There is not appear to be any gross neurologic defects or abnormalities  HEENT:  Normocephalic, atraumatic    Neck is supple without any palpable lymphadenopathy  Cardiovascular:  Patient has normal palpable distal radial pulses  There is no significant peripheral edema  No JVD is noted  Respiratory:  Patient has unlabored respirations  There is no audible wheeze or rhonchi  Abdomen:  Abdomen is   Without surgical scars  Abdomen is soft and nontender  There is no tympany  Inguinal and umbilical hernia are not appreciated  Musculoskeletal:  Patient does not have significant CVA tenderness in the  flank with palpation or percussion  They full range of motion in all 4 extremities  Strength in all 4 extremities appears congruent  Patient is able to ambulate without assistance or difficulty  Dermatologic:  Patient has no skin abnormalities or rashes  LABS:     CBC:   Lab Results   Component Value Date    WBC 7 58 2020    HGB 14 1 2020    HCT 43 8 2020    MCV 92 2020     2020       BMP:   Lab Results   Component Value Date    CALCIUM 9 9 2020    K 4 9 2020    CO2 29 2020     (H) 2020    BUN 18 2020    CREATININE 0 96 2020 8:16 AM     RBC, UA None Seen, 2-4 /hpf 4-10Abnormal     WBC, UA None Seen, 2-4 /hpf 4-10Abnormal     Epithelial Cells None Seen, Occasional /hpf None Seen    Bacteria, UA None Seen, Occasional /hpf None Seen    Hyaline Casts, UA None Seen /lpf None Seen          Specimen Collected: 20  8:16 AM        IMAGIN/24/20  RENAL ULTRASOUND     INDICATION:   R31 29: Other microscopic hematuria  R82 81: Pyuria      COMPARISON: None     TECHNIQUE:   Ultrasound of the retroperitoneum was performed with a curvilinear transducer utilizing volumetric sweeps and still imaging techniques       FINDINGS:     KIDNEYS:  Symmetric and normal size  Right kidney:  11 2 cm  Left kidney:  10 7 cm      Right kidney  Normal echogenicity and contour  No suspicious masses detected  Mildly prominent extrarenal pelvis is noted on the right  No hydronephrosis  No shadowing calculi    No perinephric fluid collections      Left kidney  Normal echogenicity and contour  No suspicious masses detected  No hydronephrosis  No shadowing calculi  No perinephric fluid collections      URETERS:  Nonvisualized      BLADDER:   Normally distended  No focal thickening or mass lesions  Bilateral ureteral jets detected         IMPRESSION:     Mildly prominent right renal pelvis is otherwise unremarkable examination  PROCEDURE:     Recent Results (from the past 2 hour(s))   POCT urine dip    Collection Time: 01/28/21  9:59 AM   Result Value Ref Range    LEUKOCYTE ESTERASE,UA neg     NITRITE,UA neg     SL AMB POCT UROBILINOGEN neg     POCT URINE PROTEIN neg      PH,UA 5     BLOOD,UA trace     SPECIFIC GRAVITY,UA 1 010     KETONES,UA neg     BILIRUBIN,UA neg     GLUCOSE, UA neg      COLOR,UA yellow     CLARITY,UA clear         ASSESSMENT:     76 y o  female with microscopic hematuria    PLAN:      I discussed the recommendations for cystoscopy and CT urogram and the patient is agreeable  She will return for the review and studies in the near future

## 2021-01-28 NOTE — TELEPHONE ENCOUNTER
Patient called back and I read off the statement from [address]  Patient asked how we know about the traces of blood  Please call patient back at [phone]

## 2021-01-28 NOTE — TELEPHONE ENCOUNTER
Called and spoke to patient  Made her aware her aware that urine was dipped in the office and showed trace of blood, there is no way to tell how many drops of blood there was

## 2021-01-28 NOTE — TELEPHONE ENCOUNTER
Attempted to call patient at this time, no answer  Left message with office number for call back       When patient calls back please let patient know the urine was dipped in the office and showed trace of blood, there is no way to tell how many drops of blood there was   Thank you

## 2021-01-28 NOTE — TELEPHONE ENCOUNTER
Called and left message for patient to call the office back  Office number was left in the message  Urine was dipped in the office and showed trace amount there is no way to tell how many drops of blood there was

## 2021-02-05 NOTE — TELEPHONE ENCOUNTER
Patient calling in regards to results  Patient does not understand them and is requesting danelle back to discuss

## 2021-02-05 NOTE — TELEPHONE ENCOUNTER
Called and spoke to patient  Made her aware that BUN and creatine are within normal limits which shows normal kidney function  She verbalized understanding and denies any other questions or concerns

## 2021-02-17 ENCOUNTER — HOSPITAL ENCOUNTER (OUTPATIENT)
Dept: CT IMAGING | Facility: CLINIC | Age: 66
Discharge: HOME | End: 2021-02-17
Payer: COMMERCIAL

## 2021-02-17 DIAGNOSIS — R31.29 OTHER MICROSCOPIC HEMATURIA: ICD-10-CM

## 2021-02-17 PROCEDURE — 74178 CT ABD&PLV WO CNTR FLWD CNTR: CPT

## 2021-02-17 PROCEDURE — G1004 CDSM NDSC: HCPCS

## 2021-02-17 RX ADMIN — IOHEXOL 100 ML: 350 INJECTION, SOLUTION INTRAVENOUS at 09:22

## 2021-02-19 NOTE — TELEPHONE ENCOUNTER
Patient called stating she had a ct scan on 02/17 and would like to know if results are available       Patient can be reached at [phone]

## 2021-02-22 ENCOUNTER — TELEPHONE (OUTPATIENT)
Dept: UROLOGY | Facility: MEDICAL CENTER | Age: 66
End: 2021-02-22

## 2021-02-22 NOTE — TELEPHONE ENCOUNTER
CT results not yet read at this time  Will monitor for results and follow up with patient at that time

## 2021-02-22 NOTE — TELEPHONE ENCOUNTER
Patient of Dr Sofiya Starr in [facility]  Radiology called with significant findings on CT scan

## 2021-02-23 ENCOUNTER — TELEPHONE (OUTPATIENT)
Dept: UROLOGY | Facility: CLINIC | Age: 66
End: 2021-02-23

## 2021-02-23 DIAGNOSIS — K83.9 BILE DUCT ABNORMALITY: Primary | ICD-10-CM

## 2021-02-23 NOTE — TELEPHONE ENCOUNTER
Patient called back needing to ask a few questions regarding the recommendation of going to a general surgeon    Patient can be reached at [phone]

## 2021-02-23 NOTE — TELEPHONE ENCOUNTER
Patient returned call  Patient stated she would really like to speak with Dr Quenton Severin   Patient can be reached at [phone]

## 2021-02-23 NOTE — TELEPHONE ENCOUNTER
----- Message from Kalpana Tijerina MD sent at 2/23/2021  8:43 AM EST -----  Please let Adi Ashford know that there are no urologic concerns on her CT  Will await cystoscopy  There is an abnormal dilation of the biliary tract and so I am going to refer her to the general surgeons to perform additional evaluation  Thanks  Call placed to patient, advised of above per provider  Patient verbalized understanding and agrees with plan  Patient will follow up with our office as scheduled for May cysto and knows to call office in meantime with any issues

## 2021-02-25 NOTE — TELEPHONE ENCOUNTER
Call placed to patient, she was not understanding why she was being referred to general surgery  I advised that radiologist recommended follow up imaging based on her recent CT  However, the issue noted is not urological and we wanted to make sure that she was followed up appropriately  Patient states that she called her PCP and he ordered the MRI/MRCP for her  Patient thanked me for the call to explain

## 2021-03-07 ENCOUNTER — HOSPITAL ENCOUNTER (OUTPATIENT)
Dept: MRI IMAGING | Facility: HOSPITAL | Age: 66
Discharge: HOME | End: 2021-03-07
Attending: INTERNAL MEDICINE
Payer: COMMERCIAL

## 2021-03-07 DIAGNOSIS — K83.9 BILE DUCT ABNORMALITY: ICD-10-CM

## 2021-03-07 PROCEDURE — A9585 GADOBUTROL INJECTION: HCPCS | Performed by: INTERNAL MEDICINE

## 2021-03-07 PROCEDURE — G1004 CDSM NDSC: HCPCS

## 2021-03-07 PROCEDURE — 74183 MRI ABD W/O CNTR FLWD CNTR: CPT

## 2021-03-07 RX ADMIN — GADOBUTROL 5 ML: 604.72 INJECTION INTRAVENOUS at 11:43

## 2021-03-12 ENCOUNTER — OFFICE VISIT (OUTPATIENT)
Dept: GASTROENTEROLOGY | Facility: CLINIC | Age: 66
End: 2021-03-12
Payer: COMMERCIAL

## 2021-03-12 ENCOUNTER — APPOINTMENT (OUTPATIENT)
Dept: LAB | Facility: CLINIC | Age: 66
End: 2021-03-12
Payer: COMMERCIAL

## 2021-03-12 ENCOUNTER — PREP FOR PROCEDURE (OUTPATIENT)
Dept: GASTROENTEROLOGY | Facility: CLINIC | Age: 66
End: 2021-03-12

## 2021-03-12 ENCOUNTER — TELEPHONE (OUTPATIENT)
Dept: INTERNAL MEDICINE CLINIC | Facility: CLINIC | Age: 66
End: 2021-03-12

## 2021-03-12 VITALS
DIASTOLIC BLOOD PRESSURE: 72 MMHG | SYSTOLIC BLOOD PRESSURE: 122 MMHG | WEIGHT: 117 LBS | BODY MASS INDEX: 20.73 KG/M2 | HEART RATE: 90 BPM | HEIGHT: 63 IN

## 2021-03-12 DIAGNOSIS — R93.3 ABNORMAL MAGNETIC RESONANCE CHOLANGIOPANCREATOGRAPHY (MRCP): ICD-10-CM

## 2021-03-12 DIAGNOSIS — K83.1 COMMON BILE DUCT (CBD) OBSTRUCTION: ICD-10-CM

## 2021-03-12 DIAGNOSIS — K83.9 BILE DUCT ABNORMALITY: Primary | ICD-10-CM

## 2021-03-12 DIAGNOSIS — R93.3 ABNORMAL MAGNETIC RESONANCE CHOLANGIOPANCREATOGRAPHY (MRCP): Primary | ICD-10-CM

## 2021-03-12 PROCEDURE — 1036F TOBACCO NON-USER: CPT | Performed by: PHYSICIAN ASSISTANT

## 2021-03-12 PROCEDURE — 82150 ASSAY OF AMYLASE: CPT

## 2021-03-12 PROCEDURE — 81003 URINALYSIS AUTO W/O SCOPE: CPT | Performed by: INTERNAL MEDICINE

## 2021-03-12 PROCEDURE — 3008F BODY MASS INDEX DOCD: CPT | Performed by: PHYSICIAN ASSISTANT

## 2021-03-12 PROCEDURE — 86301 IMMUNOASSAY TUMOR CA 19-9: CPT

## 2021-03-12 PROCEDURE — 99203 OFFICE O/P NEW LOW 30 MIN: CPT | Performed by: PHYSICIAN ASSISTANT

## 2021-03-12 PROCEDURE — 36415 COLL VENOUS BLD VENIPUNCTURE: CPT

## 2021-03-12 PROCEDURE — 83690 ASSAY OF LIPASE: CPT

## 2021-03-12 PROCEDURE — 85025 COMPLETE CBC W/AUTO DIFF WBC: CPT

## 2021-03-12 PROCEDURE — 1160F RVW MEDS BY RX/DR IN RCRD: CPT | Performed by: PHYSICIAN ASSISTANT

## 2021-03-12 PROCEDURE — 80053 COMPREHEN METABOLIC PANEL: CPT

## 2021-03-12 NOTE — TELEPHONE ENCOUNTER
----- Message from Chi Bains MD sent at 3/12/2021  8:51 AM EST -----   I just put in a "stat" consult to Dr Divine Aquino  I am trying to call him on tiger text

## 2021-03-12 NOTE — LETTER
March 12, 2021     Princess Samuel MD  [address] [address]    Patient: Tru Marsh   YOB: 1955   Date of Visit: 3/12/2021       Dear Dr Abdoul Alfonso: Thank you for referring Tru Marsh to me for evaluation  Below are my notes for this consultation  If you have questions, please do not hesitate to call me  I look forward to following your patient along with you  Sincerely,        Neha Correa PA-C        CC: No Recipients  Matthew Martycristóbal  3/12/2021 12:25 PM  Sign when Signing Visit  [facility] - Outpatient Consultation  Tru Marsh 76 y o  female MRN: [id number]  Encounter: [id number]          ASSESSMENT AND PLAN:      1  Abnormal magnetic resonance cholangiopancreatography (MRCP)  2  Common bile duct (CBD) obstruction  -Will follow-up on patient's laboratories that she is going to Have done today   -Patient will be scheduled for an ERCP on Tuesday March 16, 2021  Further recommendations will be made after patient's endoscopic evaluation  ______________________________________________________________________    HPI:   76-year-old female referred by her primary care doctor for an abnormal MRI suggesting a 1 3 x 0 5 cm lesion within the distal common bile duct resulting in common bile duct dilatation  Of note patient's liver enzymes from November were normal   Patient reports that 2 years ago she was told by her primary care doctor that time that her liver enzymes were elevated  Patient reports she fasted for 2 months and then her liver enzymes went down and there was no further workup done at that time  Patient denies any family history of bile duct or pancreatic cancer  Patient denies all GI symptoms at this time  Patient denies any yellowing her skin  Patient denies any pale colored stools  Patient's weight is stable  Patient reports that she is under a significant amount of stress right now    Patient is going for repeat laboratories this afternoon  REVIEW OF SYSTEMS:    CONSTITUTIONAL: Denies any fever, chills, rigors, and weight loss  HEENT: No earache or tinnitus  Denies hearing loss or visual disturbances  CARDIOVASCULAR: No chest pain or palpitations  RESPIRATORY: Denies any cough, hemoptysis, shortness of breath or dyspnea on exertion  GASTROINTESTINAL: As noted in the History of Present Illness  GENITOURINARY: No problems with urination  Denies any hematuria or dysuria  NEUROLOGIC: No dizziness or vertigo, denies headaches  MUSCULOSKELETAL: Denies any muscle or joint pain  SKIN: Denies skin rashes or itching  ENDOCRINE: Denies excessive thirst  Denies intolerance to heat or cold  PSYCHOSOCIAL: Denies depression or anxiety  Denies any recent memory loss         Historical Information   Past Medical History:   Diagnosis Date    Colon polyp     Medical history reviewed with no changes      Past Surgical History:   Procedure Laterality Date    BREAST BIOPSY Left 2018    benign    COLONOSCOPY      2017    THROAT SURGERY       Social History   Social History     Substance and Sexual Activity   Alcohol Use Yes    Alcohol/week: 14 0 standard drinks    Types: 14 Cans of beer per week    Frequency: 4 or more times a week    Drinks per session: 1 or 2     Social History     Substance and Sexual Activity   Drug Use No     Social History     Tobacco Use   Smoking Status Former Smoker   Smokeless Tobacco Never Used     Family History   Problem Relation Age of Onset    Dementia Mother     Diabetes Father     Breast cancer Cousin 39    Breast cancer Cousin 39    Colon cancer Neg Hx     Ovarian cancer Neg Hx     Uterine cancer Neg Hx     Cervical cancer Neg Hx        Meds/Allergies       Current Outpatient Medications:     Biotin 10 MG CAPS    calcium carbonate (OS-CARTER) 600 MG tablet    Cholecalciferol (VITAMIN D3) 1000 units CAPS    folic acid (FOLVITE) 1 mg tablet    Allergies Allergen Reactions    Alendronate      GI Sx she was subsequently put on Atelvia           Objective     Blood pressure 122/72, pulse 90, height 5' 3" (1 6 m), weight 53 1 kg (117 lb), not currently breastfeeding  Body mass index is 20 73 kg/m²  PHYSICAL EXAM:      General Appearance:   Alert, cooperative, no distress   HEENT:   Normocephalic, atraumatic, anicteric      Neck:  Supple, symmetrical, trachea midline   Lungs:   Clear to auscultation bilaterally; no rales, rhonchi or wheezing; respirations unlabored    Heart[de-identified]   Regular rate and rhythm; no murmur, rub, or gallop  Abdomen:   Soft, non-tender, non-distended; normal bowel sounds; no masses, no organomegaly    Genitalia:   Deferred    Rectal:   Deferred    Extremities:  No cyanosis, clubbing or edema    Pulses:  2+ and symmetric    Skin:  No jaundice, rashes, or lesions    Lymph nodes:  No palpable cervical lymphadenopathy        Lab Results:   No visits with results within 1 Day(s) from this visit  Latest known visit with results is:   Consult on 01/28/2021   Component Date Value    LEUKOCYTE ESTERASE,UA 01/28/2021 neg     NITRITE,UA 01/28/2021 neg     SL AMB POCT UROBILINOGEN 01/28/2021 neg     POCT URINE PROTEIN 01/28/2021 neg      PH,UA 01/28/2021 5     BLOOD,UA 01/28/2021 trace     SPECIFIC GRAVITY,UA 01/28/2021 1 010     KETONES,UA 01/28/2021 neg     BILIRUBIN,UA 01/28/2021 neg     GLUCOSE, UA 01/28/2021 neg      COLOR,UA 01/28/2021 yellow     CLARITY,UA 01/28/2021 clear          Radiology Results:   Mri Abdomen W Wo Contrast And Mrcp    Result Date: 3/12/2021  Narrative: MRI OF THE ABDOMEN WITH AND WITHOUT CONTRAST WITH MRCP INDICATION:  K83 9: Disease of biliary tract, unspecified   COMPARISON: CT 2/17/2021 TECHNIQUE:  The following pulse sequences were obtained:  Coronal and axial T2 with TE of 90 and 180 respectively, axial T2 with fat saturation, axial FIESTA fat-sat, axial T1-weighted in-and-out-of phase, axial DWI/ADC, pre-contrast axial T1 with fat saturation, post-contrast dynamic axial T1 with fat saturation at 20, 70, and 180 seconds, followed by coronal and 7 minute delayed axial T1 with fat saturation  3D MRCP images were obtained with radial thick slabs and projections  3D rendering was performed from the acquisition scanner  IV Contrast:  5 mL of Gadobutrol injection (SINGLE-DOSE) FINDINGS: LOWER CHEST:   Unremarkable  LIVER: Normal in size and configuration  No suspicious mass  The hepatic veins and portal veins are patent  BILE DUCTS: 1 3 x 0 5 cm lesion is present within the distal common bile duct resulting in upstream dilatation  Common bile duct is dilated, measuring up to 1 1 cm  No intrahepatic or extrahepatic bile duct dilatation  No biliary stricture  GALLBLADDER:  Normal  PANCREAS:  Normal  No main pancreatic ductal dilation  ADRENAL GLANDS:  Normal  SPLEEN:  Normal  KIDNEYS/PROXIMAL URETERS:  No hydroureteronephrosis  No suspicious renal mass  BOWEL:   No dilated loops of bowel  PERITONEUM/RETROPERITONEUM:  No ascites  LYMPH NODES:  No abdominal lymphadenopathy  VASCULAR STRUCTURES:  No aneurysm  ABDOMINAL WALL:  Unremarkable  OSSEOUS STRUCTURES:  No suspicious osseous lesion  Impression: 1 3 x 0 5 cm lesion within the distal common bile duct resulting in common bile duct dilatation  Differential considerations include large obstructing calculus and neoplasm  Further evaluation with ERCP with tissue sampling recommended  I personally discussed this study with [employer] on 3/12/2021 at 8:45 AM  Workstation performed: [id number]    Ct Renal Protocol    Result Date: 2/22/2021  Narrative: CT ABDOMEN AND PELVIS WITH AND WITHOUT IV CONTRAST INDICATION:   R31 29: Other microscopic hematuria   COMPARISON:  Ultrasound 11/24/2020 TECHNIQUE: Initial CT of the abdomen and pelvis was performed without intravenous contrast   Subsequent dynamic CT evaluation of the abdomen and pelvis was performed after the administration of intravenous contrast in both nephrographic and delayed phases after the administration of intravenous contrast    Axial, sagittal, and coronal 2D reformatted images were created from the source data and submitted for interpretation  Radiation dose length product (DLP) for this visit:  1561 mGy-cm   This examination, like all CT scans performed in the [facility], was performed utilizing techniques to minimize radiation dose exposure, including the use of iterative reconstruction and automated exposure control  IV Contrast:  100 mL of iohexol (OMNIPAQUE) Enteric Contrast:  Enteric contrast was not administered  FINDINGS: ABDOMEN RIGHT KIDNEY AND URETER: No solid renal mass  No detectable urothelial mass  No hydronephrosis or hydroureter  No urinary tract calculi  No perinephric collection  LEFT KIDNEY AND URETER: In the lower pole of the left kidney, there is a subcentimeter hyperattenuating focus without definitive internal enhancement (series 201, image 53; series 301, image 51)  This is incompletely evaluated due to its small size, but likely represents a proteinaceous or hemorrhagic cyst   Subcentimeter low-attenuation structures, too small to characterize, but likely representing small cysts  No hydronephrosis or hydroureter  No urinary tract calculi  No perinephric collection  URINARY BLADDER: No bladder wall mass  No calculi  LOWER CHEST:  No clinically significant abnormality identified in the visualized lower chest  LIVER/BILIARY TREE:  No abnormally enhancing renal mass  Mild left hepatic lobe intrahepatic biliary ductal dilation  GALLBLADDER:  No calcified gallstones  No pericholecystic inflammatory change  Common bile duct is mildly dilated (0 9 cm) and abruptly narrows at its insertion SPLEEN:  Unremarkable  PANCREAS:  Unremarkable  ADRENAL GLANDS:  Unremarkable  STOMACH AND BOWEL:  There is colonic diverticulosis without evidence of acute diverticulitis  ABDOMINOPELVIC CAVITY:  No ascites  No free intraperitoneal air  No lymphadenopathy  VESSELS:  Unremarkable for patient's age  PELVIS REPRODUCTIVE ORGANS:  Unremarkable for patient's age  APPENDIX: No findings to suggest appendicitis  ABDOMINAL WALL/INGUINAL REGIONS:  Unremarkable  OSSEOUS STRUCTURES:  Age indeterminate anterior wedging of the L5 vertebral body with mild height loss  Facet arthrosis lower lumbar spine  Impression: No abnormally enhancing solid renal mass  No hydronephrosis or nephroureterolithiasis  Common bile duct is mildly dilated (0 9 cm) and abruptly narrows at its insertion  This could represent underlying stricture, stone, or neoplasm  Recommend MRI/MRCP for further evaluation  Age-indeterminate mild anterior wedging of L5  Diverticulosis coli  The study was marked in EPIC for significant notification   Workstation performed: [id number]

## 2021-03-12 NOTE — TELEPHONE ENCOUNTER
I spoke with him  She should be hearing something from that office early next week  Tell the patient she does not have to fast for the repeat laboratory testing       if no one called her by Tuesday, she should call me back

## 2021-03-12 NOTE — PROGRESS NOTES
[facility] - Outpatient Consultation  Anand Martinez 76 y o  female MRN: [id number]  Encounter: [id number]          ASSESSMENT AND PLAN:      1  Abnormal magnetic resonance cholangiopancreatography (MRCP)  2  Common bile duct (CBD) obstruction  -Will follow-up on patient's laboratories that she is going to Have done today   -Patient will be scheduled for an ERCP on Tuesday March 16, 2021  Further recommendations will be made after patient's endoscopic evaluation  ______________________________________________________________________    HPI:   58-year-old female referred by her primary care doctor for an abnormal MRI suggesting a 1 3 x 0 5 cm lesion within the distal common bile duct resulting in common bile duct dilatation  Of note patient's liver enzymes from November were normal   Patient reports that 2 years ago she was told by her primary care doctor that time that her liver enzymes were elevated  Patient reports she fasted for 2 months and then her liver enzymes went down and there was no further workup done at that time  Patient denies any family history of bile duct or pancreatic cancer  Patient denies all GI symptoms at this time  Patient denies any yellowing her skin  Patient denies any pale colored stools  Patient's weight is stable  Patient reports that she is under a significant amount of stress right now  Patient is going for repeat laboratories this afternoon  REVIEW OF SYSTEMS:    CONSTITUTIONAL: Denies any fever, chills, rigors, and weight loss  HEENT: No earache or tinnitus  Denies hearing loss or visual disturbances  CARDIOVASCULAR: No chest pain or palpitations  RESPIRATORY: Denies any cough, hemoptysis, shortness of breath or dyspnea on exertion  GASTROINTESTINAL: As noted in the History of Present Illness  GENITOURINARY: No problems with urination  Denies any hematuria or dysuria  NEUROLOGIC: No dizziness or vertigo, denies headaches  MUSCULOSKELETAL: Denies any muscle or joint pain  SKIN: Denies skin rashes or itching  ENDOCRINE: Denies excessive thirst  Denies intolerance to heat or cold  PSYCHOSOCIAL: Denies depression or anxiety  Denies any recent memory loss  Historical Information   Past Medical History:   Diagnosis Date    Colon polyp     Medical history reviewed with no changes      Past Surgical History:   Procedure Laterality Date    BREAST BIOPSY Left 2018    benign    COLONOSCOPY      2017    THROAT SURGERY       Social History   Social History     Substance and Sexual Activity   Alcohol Use Yes    Alcohol/week: 14 0 standard drinks    Types: 14 Cans of beer per week    Frequency: 4 or more times a week    Drinks per session: 1 or 2     Social History     Substance and Sexual Activity   Drug Use No     Social History     Tobacco Use   Smoking Status Former Smoker   Smokeless Tobacco Never Used     Family History   Problem Relation Age of Onset    Dementia Mother     Diabetes Father     Breast cancer Cousin 39    Breast cancer Cousin 39    Colon cancer Neg Hx     Ovarian cancer Neg Hx     Uterine cancer Neg Hx     Cervical cancer Neg Hx        Meds/Allergies       Current Outpatient Medications:     Biotin 10 MG CAPS    calcium carbonate (OS-CARTER) 600 MG tablet    Cholecalciferol (VITAMIN D3) 1000 units CAPS    folic acid (FOLVITE) 1 mg tablet    Allergies   Allergen Reactions    Alendronate      GI Sx she was subsequently put on Atelvia           Objective     Blood pressure 122/72, pulse 90, height 5' 3" (1 6 m), weight 53 1 kg (117 lb), not currently breastfeeding  Body mass index is 20 73 kg/m²          PHYSICAL EXAM:      General Appearance:   Alert, cooperative, no distress   HEENT:   Normocephalic, atraumatic, anicteric      Neck:  Supple, symmetrical, trachea midline   Lungs:   Clear to auscultation bilaterally; no rales, rhonchi or wheezing; respirations unlabored    Heart[de-identified]   Regular rate and rhythm; no murmur, rub, or gallop  Abdomen:   Soft, non-tender, non-distended; normal bowel sounds; no masses, no organomegaly    Genitalia:   Deferred    Rectal:   Deferred    Extremities:  No cyanosis, clubbing or edema    Pulses:  2+ and symmetric    Skin:  No jaundice, rashes, or lesions    Lymph nodes:  No palpable cervical lymphadenopathy        Lab Results:   No visits with results within 1 Day(s) from this visit  Latest known visit with results is:   Consult on 01/28/2021   Component Date Value    LEUKOCYTE ESTERASE,UA 01/28/2021 neg     NITRITE,UA 01/28/2021 neg     SL AMB POCT UROBILINOGEN 01/28/2021 neg     POCT URINE PROTEIN 01/28/2021 neg      PH,UA 01/28/2021 5     BLOOD,UA 01/28/2021 trace     SPECIFIC GRAVITY,UA 01/28/2021 1 010     KETONES,UA 01/28/2021 neg     BILIRUBIN,UA 01/28/2021 neg     GLUCOSE, UA 01/28/2021 neg      COLOR,UA 01/28/2021 yellow     CLARITY,UA 01/28/2021 clear          Radiology Results:   Mri Abdomen W Wo Contrast And Mrcp    Result Date: 3/12/2021  Narrative: MRI OF THE ABDOMEN WITH AND WITHOUT CONTRAST WITH MRCP INDICATION:  K83 9: Disease of biliary tract, unspecified  COMPARISON: CT 2/17/2021 TECHNIQUE:  The following pulse sequences were obtained:  Coronal and axial T2 with TE of 90 and 180 respectively, axial T2 with fat saturation, axial FIESTA fat-sat, axial T1-weighted in-and-out-of phase, axial DWI/ADC, pre-contrast axial T1 with fat saturation, post-contrast dynamic axial T1 with fat saturation at 20, 70, and 180 seconds, followed by coronal and 7 minute delayed axial T1 with fat saturation  3D MRCP images were obtained with radial thick slabs and projections  3D rendering was performed from the acquisition scanner  IV Contrast:  5 mL of Gadobutrol injection (SINGLE-DOSE) FINDINGS: LOWER CHEST:   Unremarkable  LIVER: Normal in size and configuration  No suspicious mass  The hepatic veins and portal veins are patent   BILE DUCTS: 1 3 x 0 5 cm lesion is present within the distal common bile duct resulting in upstream dilatation  Common bile duct is dilated, measuring up to 1 1 cm  No intrahepatic or extrahepatic bile duct dilatation  No biliary stricture  GALLBLADDER:  Normal  PANCREAS:  Normal  No main pancreatic ductal dilation  ADRENAL GLANDS:  Normal  SPLEEN:  Normal  KIDNEYS/PROXIMAL URETERS:  No hydroureteronephrosis  No suspicious renal mass  BOWEL:   No dilated loops of bowel  PERITONEUM/RETROPERITONEUM:  No ascites  LYMPH NODES:  No abdominal lymphadenopathy  VASCULAR STRUCTURES:  No aneurysm  ABDOMINAL WALL:  Unremarkable  OSSEOUS STRUCTURES:  No suspicious osseous lesion  Impression: 1 3 x 0 5 cm lesion within the distal common bile duct resulting in common bile duct dilatation  Differential considerations include large obstructing calculus and neoplasm  Further evaluation with ERCP with tissue sampling recommended  I personally discussed this study with Ger Villafuerte on 3/12/2021 at 8:45 AM  Workstation performed: [id number]    Ct Renal Protocol    Result Date: 2/22/2021  Narrative: CT ABDOMEN AND PELVIS WITH AND WITHOUT IV CONTRAST INDICATION:   R31 29: Other microscopic hematuria  COMPARISON:  Ultrasound 11/24/2020 TECHNIQUE: Initial CT of the abdomen and pelvis was performed without intravenous contrast   Subsequent dynamic CT evaluation of the abdomen and pelvis was performed after the administration of intravenous contrast in both nephrographic and delayed phases after the administration of intravenous contrast    Axial, sagittal, and coronal 2D reformatted images were created from the source data and submitted for interpretation  Radiation dose length product (DLP) for this visit:  1561 mGy-cm   This examination, like all CT scans performed in the [facility], was performed utilizing techniques to minimize radiation dose exposure, including the use of iterative reconstruction and automated exposure control   IV Contrast:  100 mL of iohexol (OMNIPAQUE) Enteric Contrast:  Enteric contrast was not administered  FINDINGS: ABDOMEN RIGHT KIDNEY AND URETER: No solid renal mass  No detectable urothelial mass  No hydronephrosis or hydroureter  No urinary tract calculi  No perinephric collection  LEFT KIDNEY AND URETER: In the lower pole of the left kidney, there is a subcentimeter hyperattenuating focus without definitive internal enhancement (series 201, image 53; series 301, image 51)  This is incompletely evaluated due to its small size, but likely represents a proteinaceous or hemorrhagic cyst   Subcentimeter low-attenuation structures, too small to characterize, but likely representing small cysts  No hydronephrosis or hydroureter  No urinary tract calculi  No perinephric collection  URINARY BLADDER: No bladder wall mass  No calculi  LOWER CHEST:  No clinically significant abnormality identified in the visualized lower chest  LIVER/BILIARY TREE:  No abnormally enhancing renal mass  Mild left hepatic lobe intrahepatic biliary ductal dilation  GALLBLADDER:  No calcified gallstones  No pericholecystic inflammatory change  Common bile duct is mildly dilated (0 9 cm) and abruptly narrows at its insertion SPLEEN:  Unremarkable  PANCREAS:  Unremarkable  ADRENAL GLANDS:  Unremarkable  STOMACH AND BOWEL:  There is colonic diverticulosis without evidence of acute diverticulitis  ABDOMINOPELVIC CAVITY:  No ascites  No free intraperitoneal air  No lymphadenopathy  VESSELS:  Unremarkable for patient's age  PELVIS REPRODUCTIVE ORGANS:  Unremarkable for patient's age  APPENDIX: No findings to suggest appendicitis  ABDOMINAL WALL/INGUINAL REGIONS:  Unremarkable  OSSEOUS STRUCTURES:  Age indeterminate anterior wedging of the L5 vertebral body with mild height loss  Facet arthrosis lower lumbar spine  Impression: No abnormally enhancing solid renal mass  No hydronephrosis or nephroureterolithiasis   Common bile duct is mildly dilated (0 9 cm) and abruptly narrows at its insertion  This could represent underlying stricture, stone, or neoplasm  Recommend MRI/MRCP for further evaluation  Age-indeterminate mild anterior wedging of L5  Diverticulosis coli  The study was marked in EPIC for significant notification   Workstation performed: [id number]

## 2021-03-13 LAB
ALBUMIN SERPL BCP-MCNC: 4.2 G/DL (ref 3.5–5)
ALP SERPL-CCNC: 47 U/L (ref 46–116)
ALT SERPL W P-5'-P-CCNC: 30 U/L (ref 12–78)
AMYLASE SERPL-CCNC: 56 IU/L (ref 25–115)
ANION GAP SERPL CALCULATED.3IONS-SCNC: 6 MMOL/L (ref 4–13)
AST SERPL W P-5'-P-CCNC: 25 U/L (ref 5–45)
BASOPHILS # BLD AUTO: 0.04 THOUSANDS/ΜL (ref 0–0.1)
BASOPHILS NFR BLD AUTO: 1 % (ref 0–1)
BILIRUB SERPL-MCNC: 0.54 MG/DL (ref 0.2–1)
BILIRUB UR QL STRIP: NEGATIVE
BUN SERPL-MCNC: 16 MG/DL (ref 5–25)
CALCIUM SERPL-MCNC: 9.9 MG/DL (ref 8.3–10.1)
CHLORIDE SERPL-SCNC: 106 MMOL/L (ref 100–108)
CLARITY UR: CLEAR
CO2 SERPL-SCNC: 29 MMOL/L (ref 21–32)
COLOR UR: YELLOW
CREAT SERPL-MCNC: 0.85 MG/DL (ref 0.6–1.3)
EOSINOPHIL # BLD AUTO: 0.18 THOUSAND/ΜL (ref 0–0.61)
EOSINOPHIL NFR BLD AUTO: 3 % (ref 0–6)
ERYTHROCYTE [DISTWIDTH] IN BLOOD BY AUTOMATED COUNT: 13.2 % (ref 11.6–15.1)
GFR SERPL CREATININE-BSD FRML MDRD: 72 ML/MIN/1.73SQ M
GLUCOSE SERPL-MCNC: 82 MG/DL (ref 65–140)
GLUCOSE UR STRIP-MCNC: NEGATIVE MG/DL
HCT VFR BLD AUTO: 42.7 % (ref 34.8–46.1)
HGB BLD-MCNC: 13.9 G/DL (ref 11.5–15.4)
HGB UR QL STRIP.AUTO: NEGATIVE
IMM GRANULOCYTES # BLD AUTO: 0.03 THOUSAND/UL (ref 0–0.2)
IMM GRANULOCYTES NFR BLD AUTO: 0 % (ref 0–2)
KETONES UR STRIP-MCNC: NEGATIVE MG/DL
LEUKOCYTE ESTERASE UR QL STRIP: NEGATIVE
LIPASE SERPL-CCNC: 90 U/L (ref 73–393)
LYMPHOCYTES # BLD AUTO: 1.43 THOUSANDS/ΜL (ref 0.6–4.47)
LYMPHOCYTES NFR BLD AUTO: 20 % (ref 14–44)
MCH RBC QN AUTO: 29.1 PG (ref 26.8–34.3)
MCHC RBC AUTO-ENTMCNC: 32.6 G/DL (ref 31.4–37.4)
MCV RBC AUTO: 89 FL (ref 82–98)
MONOCYTES # BLD AUTO: 0.68 THOUSAND/ΜL (ref 0.17–1.22)
MONOCYTES NFR BLD AUTO: 9 % (ref 4–12)
NEUTROPHILS # BLD AUTO: 4.96 THOUSANDS/ΜL (ref 1.85–7.62)
NEUTS SEG NFR BLD AUTO: 67 % (ref 43–75)
NITRITE UR QL STRIP: NEGATIVE
NRBC BLD AUTO-RTO: 0 /100 WBCS
PH UR STRIP.AUTO: 6.5 [PH]
PLATELET # BLD AUTO: 282 THOUSANDS/UL (ref 149–390)
PMV BLD AUTO: 9.9 FL (ref 8.9–12.7)
POTASSIUM SERPL-SCNC: 4 MMOL/L (ref 3.5–5.3)
PROT SERPL-MCNC: 7.8 G/DL (ref 6.4–8.2)
PROT UR STRIP-MCNC: NEGATIVE MG/DL
RBC # BLD AUTO: 4.78 MILLION/UL (ref 3.81–5.12)
SODIUM SERPL-SCNC: 141 MMOL/L (ref 136–145)
SP GR UR STRIP.AUTO: 1.01 (ref 1–1.03)
UROBILINOGEN UR QL STRIP.AUTO: 0.2 E.U./DL
WBC # BLD AUTO: 7.32 THOUSAND/UL (ref 4.31–10.16)

## 2021-03-14 LAB — CANCER AG19-9 SERPL-ACNC: 8 U/ML (ref 0–35)

## 2021-03-15 ENCOUNTER — TELEPHONE (OUTPATIENT)
Dept: GASTROENTEROLOGY | Facility: CLINIC | Age: 66
End: 2021-03-15

## 2021-03-15 NOTE — TELEPHONE ENCOUNTER
----- Message from Maryan Valencia sent at 3/15/2021  2:00 PM EDT -----  Regarding: Pre-Op/Post-Op Question  Contact: [phone]  Edi Lombardo,    Please advise time of endoscopy I am coming in for tomorrow  I need to make arrangements      Thanks,  Jed Severance

## 2021-03-15 NOTE — TELEPHONE ENCOUNTER
----- Message from Lilia Russell sent at 3/15/2021 12:22 PM EDT -----  Regarding: Pre-Op/Post-Op Question  Contact: [phone]  Hello,    I am checking to see if an appointment has been scheduled yet for my endoscopy for 3/16/2021  Please advise so I can make arrangements        Thank you,  Marlynn Spurling Amplo

## 2021-03-16 ENCOUNTER — ANESTHESIA (OUTPATIENT)
Dept: PERIOP | Facility: HOSPITAL | Age: 66
End: 2021-03-16

## 2021-03-16 ENCOUNTER — ANESTHESIA EVENT (OUTPATIENT)
Dept: PERIOP | Facility: HOSPITAL | Age: 66
End: 2021-03-16

## 2021-03-16 ENCOUNTER — HOSPITAL ENCOUNTER (OUTPATIENT)
Dept: RADIOLOGY | Facility: HOSPITAL | Age: 66
Setting detail: OUTPATIENT SURGERY
Discharge: HOME | End: 2021-03-16
Payer: COMMERCIAL

## 2021-03-16 ENCOUNTER — HOSPITAL ENCOUNTER (OUTPATIENT)
Dept: PERIOP | Facility: HOSPITAL | Age: 66
Setting detail: OUTPATIENT SURGERY
Discharge: HOME | End: 2021-03-16
Attending: INTERNAL MEDICINE
Payer: COMMERCIAL

## 2021-03-16 VITALS
WEIGHT: 116.4 LBS | RESPIRATION RATE: 18 BRPM | BODY MASS INDEX: 19.87 KG/M2 | HEIGHT: 64 IN | TEMPERATURE: 97.2 F | HEART RATE: 74 BPM | OXYGEN SATURATION: 98 % | SYSTOLIC BLOOD PRESSURE: 130 MMHG | DIASTOLIC BLOOD PRESSURE: 86 MMHG

## 2021-03-16 DIAGNOSIS — K83.9 BILE DUCT ABNORMALITY: ICD-10-CM

## 2021-03-16 PROCEDURE — 74328 X-RAY BILE DUCT ENDOSCOPY: CPT

## 2021-03-16 PROCEDURE — 88305 TISSUE EXAM BY PATHOLOGIST: CPT | Performed by: PATHOLOGY

## 2021-03-16 PROCEDURE — C1769 GUIDE WIRE: HCPCS

## 2021-03-16 PROCEDURE — 43261 ENDO CHOLANGIOPANCREATOGRAPH: CPT | Performed by: INTERNAL MEDICINE

## 2021-03-16 PROCEDURE — C2617 STENT, NON-COR, TEM W/O DEL: HCPCS

## 2021-03-16 PROCEDURE — 43274 ERCP DUCT STENT PLACEMENT: CPT | Performed by: INTERNAL MEDICINE

## 2021-03-16 RX ORDER — SODIUM CHLORIDE, SODIUM LACTATE, POTASSIUM CHLORIDE, CALCIUM CHLORIDE 600; 310; 30; 20 MG/100ML; MG/100ML; MG/100ML; MG/100ML
75 INJECTION, SOLUTION INTRAVENOUS CONTINUOUS
Status: CANCELLED | OUTPATIENT
Start: 2021-03-16

## 2021-03-16 RX ORDER — LIDOCAINE HYDROCHLORIDE 10 MG/ML
INJECTION, SOLUTION EPIDURAL; INFILTRATION; INTRACAUDAL; PERINEURAL AS NEEDED
Status: DISCONTINUED | OUTPATIENT
Start: 2021-03-16 | End: 2021-03-16

## 2021-03-16 RX ORDER — SODIUM CHLORIDE, SODIUM LACTATE, POTASSIUM CHLORIDE, CALCIUM CHLORIDE 600; 310; 30; 20 MG/100ML; MG/100ML; MG/100ML; MG/100ML
125 INJECTION, SOLUTION INTRAVENOUS CONTINUOUS
Status: DISCONTINUED | OUTPATIENT
Start: 2021-03-16 | End: 2021-03-20 | Stop reason: HOSPADM

## 2021-03-16 RX ORDER — PROPOFOL 10 MG/ML
INJECTION, EMULSION INTRAVENOUS CONTINUOUS PRN
Status: DISCONTINUED | OUTPATIENT
Start: 2021-03-16 | End: 2021-03-16

## 2021-03-16 RX ORDER — FENTANYL CITRATE/PF 50 MCG/ML
50 SYRINGE (ML) INJECTION
Status: CANCELLED | OUTPATIENT
Start: 2021-03-16

## 2021-03-16 RX ORDER — PROPOFOL 10 MG/ML
INJECTION, EMULSION INTRAVENOUS AS NEEDED
Status: DISCONTINUED | OUTPATIENT
Start: 2021-03-16 | End: 2021-03-16

## 2021-03-16 RX ORDER — MIDAZOLAM HYDROCHLORIDE 2 MG/2ML
INJECTION, SOLUTION INTRAMUSCULAR; INTRAVENOUS AS NEEDED
Status: DISCONTINUED | OUTPATIENT
Start: 2021-03-16 | End: 2021-03-16

## 2021-03-16 RX ORDER — ONDANSETRON 2 MG/ML
4 INJECTION INTRAMUSCULAR; INTRAVENOUS ONCE AS NEEDED
Status: CANCELLED | OUTPATIENT
Start: 2021-03-16

## 2021-03-16 RX ADMIN — PROPOFOL 120 MCG/KG/MIN: 10 INJECTION, EMULSION INTRAVENOUS at 14:30

## 2021-03-16 RX ADMIN — INDOMETHACIN 100 MG: 50 SUPPOSITORY RECTAL at 14:26

## 2021-03-16 RX ADMIN — PROPOFOL 50 MG: 10 INJECTION, EMULSION INTRAVENOUS at 14:45

## 2021-03-16 RX ADMIN — SODIUM CHLORIDE, SODIUM LACTATE, POTASSIUM CHLORIDE, AND CALCIUM CHLORIDE 125 ML/HR: .6; .31; .03; .02 INJECTION, SOLUTION INTRAVENOUS at 13:26

## 2021-03-16 RX ADMIN — PROPOFOL 50 MG: 10 INJECTION, EMULSION INTRAVENOUS at 14:30

## 2021-03-16 RX ADMIN — MIDAZOLAM HYDROCHLORIDE 2 MG: 1 INJECTION, SOLUTION INTRAMUSCULAR; INTRAVENOUS at 14:25

## 2021-03-16 RX ADMIN — IOHEXOL 30 ML: 350 INJECTION, SOLUTION INTRAVENOUS at 15:30

## 2021-03-16 RX ADMIN — LIDOCAINE HYDROCHLORIDE 50 MG: 10 INJECTION, SOLUTION EPIDURAL; INFILTRATION; INTRACAUDAL; PERINEURAL at 14:30

## 2021-03-16 NOTE — ANESTHESIA POSTPROCEDURE EVALUATION
Post-Op Assessment Note    CV Status:  Stable  Pain Score: 1    Pain management: adequate     Mental Status:  Sleepy   Hydration Status:  Stable   PONV Controlled:  Controlled   Airway Patency:  Patent      Post Op Vitals Reviewed: Yes      Staff: Anesthesiologist, CRNA         No complications documented      BP      Temp      Pulse     Resp      SpO2

## 2021-03-16 NOTE — DISCHARGE INSTRUCTIONS
Endoscopic Biliary Stent Placement   WHAT YOU NEED TO KNOW:   An endoscopic biliary stent placement is a procedure to open a blocked bile duct  Your bile duct carries bile from your gallbladder to your small intestine  Bile helps your body digest foods  A stent is a small tube made of plastic or metal  The stent helps widen your bile duct and keeps it open  DISCHARGE INSTRUCTIONS:   Seek care immediately if:   · You have severe abdominal pain or your abdomen is larger than usual      · You faint  · You vomit blood or something that looks like coffee grounds  · You have blood in your bowel movements, or your bowel movements look like tar  · Your skin or whites of your eyes are yellow  Contact your healthcare provider if:   · You have a fever or chills  · You have nausea or are vomiting  · Your skin is itchy, swollen, or you have a rash  · You have questions or concerns about your condition or care  Medicines:   · Medicines  may be given to decrease pain  · Take your medicine as directed  Contact your healthcare provider if you think your medicine is not helping or if you have side effects  Tell him or her if you are allergic to any medicine  Keep a list of the medicines, vitamins, and herbs you take  Include the amounts, and when and why you take them  Bring the list or the pill bottles to follow-up visits  Carry your medicine list with you in case of an emergency  Self-care:   · Rest as directed  Return to your regular activities in 24 hours or as directed  · Drink liquids as directed  Liquids will help flush the contrast liquid from your body  Ask how much liquid to drink each day and which liquids are best for you  · Eat small meals more often  This may help prevent nausea  Do not eat spicy or greasy foods  Follow up with your healthcare provider as directed: You may need to return for more tests   Write down your questions so you remember to ask them during your visits  © Copyright [address] Information is for End User's use only and may not be sold, redistributed or otherwise used for commercial purposes  All illustrations and images included in CareNotes® are the copyrighted property of A D A M , Inc  or Carol Pedroza  The above information is an  only  It is not intended as medical advice for individual conditions or treatments  Talk to your doctor, nurse or pharmacist before following any medical regimen to see if it is safe and effective for you

## 2021-03-16 NOTE — ANESTHESIA PREPROCEDURE EVALUATION
Procedure:  ERCP    Relevant Problems   No relevant active problems        Physical Exam    Airway    Mallampati score: II  TM Distance: >3 FB  Neck ROM: full     Dental       Cardiovascular  Rhythm: regular, Rate: normal, Cardiovascular exam normal    Pulmonary  Pulmonary exam normal Breath sounds clear to auscultation,     Other Findings  1 dental implant      Anesthesia Plan  ASA Score- 2     Anesthesia Type-         Additional Monitors:   Airway Plan: ETT  Plan Factors-Exercise tolerance (METS): >4 METS  Chart reviewed  EKG reviewed  Existing labs reviewed  Patient is not a current smoker  Patient not instructed to abstain from smoking on day of procedure  Patient did not smoke on day of surgery  Induction- intravenous  Postoperative Plan-   Planned trial extubation    Informed Consent- Anesthetic plan and risks discussed with patient  I personally reviewed this patient with the CRNA  Discussed and agreed on the Anesthesia Plan with the EDGAR Galvaiz

## 2021-03-16 NOTE — H&P
History and Physical - [facility]  Katerin Hein 76 y o  female MRN: [id number]      HPI: Katerin Hein is a 76y o  year old female who presents for evaluation of an abnormal MRI/MRCP showing a possible mass or stone in the distal common bile duct      REVIEW OF SYSTEMS: Per the HPI, and otherwise unremarkable  Historical Information   Past Medical History:   Diagnosis Date    Colon polyp     Medical history reviewed with no changes      Past Surgical History:   Procedure Laterality Date    BREAST BIOPSY Left 2018    benign    COLONOSCOPY      2017    THROAT SURGERY       Social History   Social History     Substance and Sexual Activity   Alcohol Use Yes    Alcohol/week: 14 0 standard drinks    Types: 14 Cans of beer per week    Frequency: 4 or more times a week    Drinks per session: 1 or 2     Social History     Substance and Sexual Activity   Drug Use No     Social History     Tobacco Use   Smoking Status Former Smoker   Smokeless Tobacco Never Used     Family History   Problem Relation Age of Onset    Dementia Mother     Diabetes Father     Breast cancer Cousin 39    Breast cancer Cousin 39    Colon cancer Neg Hx     Ovarian cancer Neg Hx     Uterine cancer Neg Hx     Cervical cancer Neg Hx        Meds/Allergies     (Not in a hospital admission)      Allergies   Allergen Reactions    Alendronate      GI Sx she was subsequently put on Atelvia       Objective     not currently breastfeeding  PHYSICAL EXAM    Gen: NAD  CV: RRR  CHEST: Clear  ABD: soft, NT/ND  EXT: no edema      ASSESSMENT/PLAN:  This is a 76y o  year old female here for endoscopic retrograde cholangiopancreatography, and she is stable and optimized for her procedure

## 2021-03-17 ENCOUNTER — TELEPHONE (OUTPATIENT)
Dept: GASTROENTEROLOGY | Facility: CLINIC | Age: 66
End: 2021-03-17

## 2021-03-17 NOTE — TELEPHONE ENCOUNTER
----- Message from Abisai Herrera sent at 3/17/2021  2:55 PM EDT -----  Regarding: Non-Urgent Medical Question  Contact: [phone]  Hello again,    Thanks again for getting back to me  Forgot to ask  How bad is it to have bubbles in bile duct?

## 2021-03-17 NOTE — TELEPHONE ENCOUNTER
----- Message from Sean Lagos sent at 3/16/2021  8:42 PM EDT -----  Regarding: Pre-Op/Post-Op Question  Contact: [phone]  Edi Lombardo,    Can you call me to explain a little clearer what the findings were in the impressions part of Dr Yris Thrasher' notes  Why do I need to go to [facility] for surgery? Also, why 5 days for biopsy reults? You have a way to calm my nerves which I need right now  I appreciate your compassion  Please call me anytime at [phone] or email if that works better for you  I hope to hear from you tomorrow      Thank you so much,  Betsey Rai

## 2021-03-19 ENCOUNTER — TELEPHONE (OUTPATIENT)
Dept: GASTROENTEROLOGY | Facility: CLINIC | Age: 66
End: 2021-03-19

## 2021-03-19 NOTE — TELEPHONE ENCOUNTER
----- Message from Mason Jim sent at 3/19/2021  2:36 PM EDT -----  Regarding: Non-Urgent Medical Question  Contact: [phone]  Edi Lopez! Thank you so much for helping me stay calm during this scary issue  I so appreciate your kindness and compassion  Have a great weekend    I know I will!!!!!!! :)    Jude Lincoln

## 2021-03-24 ENCOUNTER — TELEPHONE (OUTPATIENT)
Dept: GASTROENTEROLOGY | Facility: CLINIC | Age: 66
End: 2021-03-24

## 2021-03-24 NOTE — TELEPHONE ENCOUNTER
----- Message from Tj Nesbitt sent at 3/23/2021  3:29 PM EDT -----  Regarding: RE: Non-Urgent Medical Question  Contact: [phone]  Edi Lombardo,    Am I waiting for a call from someone regarding the ampullectomy or should I be calling someone?

## 2021-03-31 ENCOUNTER — TELEPHONE (OUTPATIENT)
Dept: GASTROENTEROLOGY | Facility: CLINIC | Age: 66
End: 2021-03-31

## 2021-04-06 ENCOUNTER — TELEPHONE (OUTPATIENT)
Dept: GASTROENTEROLOGY | Facility: CLINIC | Age: 66
End: 2021-04-06

## 2021-04-06 NOTE — TELEPHONE ENCOUNTER
----- Message from Ismael Parmar sent at 4/6/2021  8:50 AM EDT -----  Regarding: Pre-Op/Post-Op Question  Contact: [phone]  Edi Lombardo,    I am getting scheduled for my ampullectomy procedure  Do you know how this is performed? Dr Shey Elaine said there are different ways it can be done but did not say which one I would have  Do they open me up or is it done with a scope?

## 2021-04-06 NOTE — TELEPHONE ENCOUNTER
Please let her know it is done with a scope  It is similar to the ERCP she had with Dr Charisse Beltrán but it will be a longer procedure  The  Keerthi Rodríguez is going to set her up for a day to have the procedure and they are going to arrange a virtual visit for her to have with the provider (should be Dr Sam Meckel or Dr Alan Thomas) and they can go over more details/answers questions

## 2021-04-08 ENCOUNTER — PREP FOR PROCEDURE (OUTPATIENT)
Dept: GASTROENTEROLOGY | Facility: CLINIC | Age: 66
End: 2021-04-08

## 2021-04-08 ENCOUNTER — TELEPHONE (OUTPATIENT)
Dept: INTERNAL MEDICINE CLINIC | Facility: CLINIC | Age: 66
End: 2021-04-08

## 2021-04-08 DIAGNOSIS — K83.9 BILE DUCT ABNORMALITY: Primary | ICD-10-CM

## 2021-04-08 NOTE — TELEPHONE ENCOUNTER
Adelina Sena,  I did speak to Dr Charisse Beltrán about the patient but didn't receive any information on the patient -- she has tubullovillous adenoma of the ampullary orifice  She will need EUS and ERCP with ampullectomy by either myself or Dr Alan Rodríguez, can you help set her up for EUS and ERCP and prior to the procedure set up with a virtual appointment with whomever is doing her procedure? Thank you!   Chantal Page
EUS/ERCP scheduled on 4/28/21 with Dr Rodriguez at [facility]  I gave pt verbal instructions/mailed    Virtual Visit scheduled on 4/13/21 with Seth Sims
Good afternoon! I am just wondering if this patient has been set up for an ERCP/EUS as of yet  I know Roma Irene spoke with you about this patient after her ERCP  Thank you so much and have a great day  Thank you!
Great Thank you! She will be happy to get this taken care of  Have a good day!  Tiff
LVM to contact the office to schedule EUS/ERCP/Virtual visit 
administer in food

## 2021-04-08 NOTE — TELEPHONE ENCOUNTER
Patient states she is getting a lot of calls from some of the tests she had done  She wants Dr Marek Olivo  to let her know what is going on with her health  Patient is upset that no one seems to be explaining anything to her

## 2021-04-09 NOTE — TELEPHONE ENCOUNTER
She said if you just want to sit her down to tell her what she already knows she does not feel an appointment is necessary   She is seeing the surgeon and other docs next week so she will get back with you

## 2021-04-13 ENCOUNTER — TELEMEDICINE (OUTPATIENT)
Dept: GASTROENTEROLOGY | Facility: CLINIC | Age: 66
End: 2021-04-13

## 2021-04-13 DIAGNOSIS — D13.5 AMPULLARY ADENOMA: ICD-10-CM

## 2021-04-13 DIAGNOSIS — K83.9 BILE DUCT ABNORMALITY: Primary | ICD-10-CM

## 2021-04-13 PROCEDURE — 99214 OFFICE O/P EST MOD 30 MIN: CPT | Performed by: INTERNAL MEDICINE

## 2021-04-13 NOTE — PROGRESS NOTES
Virtual Regular Visit      Assessment/Plan:    Problem List Items Addressed This Visit        Digestive    Bile duct abnormality - Primary      Other Visit Diagnoses     Ampullary adenoma            Will proceed with ERCP and ampullectomy for removal of ampullary adenoma  Described procedure to patient in great detail and went over risks including incomplete resection of adenoma, perforation, bleeding, infection, pancreatitis  Reason for visit is   Chief Complaint   Patient presents with    Virtual Regular Visit        Encounter provider Madhu Toure MD    Provider located at [address] [place] [id number] [phone]      Recent Visits  Date Type Provider Dept   04/08/21 Telephone Bharti Gusman  [facility] recent visits within past 7 days and meeting all other requirements     Future Appointments  No visits were found meeting these conditions  Showing future appointments within next 150 days and meeting all other requirements        The patient was identified by name and date of birth  Colleen Lopez was informed that this is a telemedicine visit and that the visit is being conducted through [facility] and patient was informed that this is a secure, HIPAA-compliant platform  She agrees to proceed     My office door was closed  No one else was in the room  She acknowledged consent and understanding of privacy and security of the video platform  The patient has agreed to participate and understands they can discontinue the visit at any time  Patient is aware this is a billable service  Subjective  Colleen Lopez is a 78 y o  female with history of ampullary mass  ERCP with common bile duct placement as she had ampullary mass identified  Biopsies of ampullary lesion revealed tubulovillous adenoma  She has no symptoms at this time         HPI     Past Medical History:   Diagnosis Date  Colon polyp     Medical history reviewed with no changes        Past Surgical History:   Procedure Laterality Date    BREAST BIOPSY Left 2018    benign    COLONOSCOPY      2017    THROAT SURGERY         Current Outpatient Medications   Medication Sig Dispense Refill    Biotin 10 MG CAPS Take by mouth      calcium carbonate (OS-CARTER) 600 MG tablet Take 600 mg by mouth 2 (two) times a day with meals      Cholecalciferol (VITAMIN D3) 1000 units CAPS Take by mouth      folic acid (FOLVITE) 1 mg tablet Take by mouth daily       No current facility-administered medications for this visit  Allergies   Allergen Reactions    Alendronate      GI Sx she was subsequently put on Atelvia       Review of Systems    Video Exam    There were no vitals filed for this visit  Physical Exam     I spent 20 minutes directly with the patient during this visit      VIRTUAL VISIT DISCLAIMER    Tabitha Jordan acknowledges that she has consented to an online visit or consultation  She understands that the online visit is based solely on information provided by her, and that, in the absence of a face-to-face physical evaluation by the physician, the diagnosis she receives is both limited and provisional in terms of accuracy and completeness  This is not intended to replace a full medical face-to-face evaluation by the physician  Tabitha Jordan understands and accepts these terms

## 2021-04-28 ENCOUNTER — ANESTHESIA (OUTPATIENT)
Dept: GASTROENTEROLOGY | Facility: HOSPITAL | Age: 66
End: 2021-04-28

## 2021-04-28 ENCOUNTER — ANESTHESIA EVENT (OUTPATIENT)
Dept: GASTROENTEROLOGY | Facility: HOSPITAL | Age: 66
End: 2021-04-28

## 2021-04-28 ENCOUNTER — HOSPITAL ENCOUNTER (OUTPATIENT)
Dept: GASTROENTEROLOGY | Facility: HOSPITAL | Age: 66
Setting detail: OUTPATIENT SURGERY
Discharge: HOME | End: 2021-04-28
Attending: INTERNAL MEDICINE | Admitting: INTERNAL MEDICINE
Payer: COMMERCIAL

## 2021-04-28 ENCOUNTER — HOSPITAL ENCOUNTER (OUTPATIENT)
Dept: RADIOLOGY | Facility: HOSPITAL | Age: 66
Discharge: HOME | End: 2021-04-28
Payer: COMMERCIAL

## 2021-04-28 VITALS
TEMPERATURE: 97.1 F | OXYGEN SATURATION: 99 % | HEART RATE: 78 BPM | SYSTOLIC BLOOD PRESSURE: 132 MMHG | HEIGHT: 63 IN | WEIGHT: 118 LBS | BODY MASS INDEX: 20.91 KG/M2 | DIASTOLIC BLOOD PRESSURE: 65 MMHG | RESPIRATION RATE: 16 BRPM

## 2021-04-28 DIAGNOSIS — K83.9 BILE DUCT ABNORMALITY: ICD-10-CM

## 2021-04-28 PROCEDURE — C2617 STENT, NON-COR, TEM W/O DEL: HCPCS

## 2021-04-28 PROCEDURE — 88305 TISSUE EXAM BY PATHOLOGIST: CPT | Performed by: PATHOLOGY

## 2021-04-28 PROCEDURE — 74328 X-RAY BILE DUCT ENDOSCOPY: CPT

## 2021-04-28 PROCEDURE — C1769 GUIDE WIRE: HCPCS

## 2021-04-28 PROCEDURE — 43261 ENDO CHOLANGIOPANCREATOGRAPH: CPT | Performed by: INTERNAL MEDICINE

## 2021-04-28 PROCEDURE — 43237 ENDOSCOPIC US EXAM ESOPH: CPT | Performed by: INTERNAL MEDICINE

## 2021-04-28 PROCEDURE — 43276 ERCP STENT EXCHANGE W/DILATE: CPT | Performed by: INTERNAL MEDICINE

## 2021-04-28 RX ORDER — ONDANSETRON 2 MG/ML
INJECTION INTRAMUSCULAR; INTRAVENOUS AS NEEDED
Status: DISCONTINUED | OUTPATIENT
Start: 2021-04-28 | End: 2021-04-28

## 2021-04-28 RX ORDER — LIDOCAINE HYDROCHLORIDE 10 MG/ML
0.5 INJECTION, SOLUTION EPIDURAL; INFILTRATION; INTRACAUDAL; PERINEURAL ONCE AS NEEDED
Status: DISCONTINUED | OUTPATIENT
Start: 2021-04-28 | End: 2021-05-02 | Stop reason: HOSPADM

## 2021-04-28 RX ORDER — SUCCINYLCHOLINE/SOD CL,ISO/PF 100 MG/5ML
SYRINGE (ML) INTRAVENOUS AS NEEDED
Status: DISCONTINUED | OUTPATIENT
Start: 2021-04-28 | End: 2021-04-28

## 2021-04-28 RX ORDER — SODIUM CHLORIDE, SODIUM LACTATE, POTASSIUM CHLORIDE, CALCIUM CHLORIDE 600; 310; 30; 20 MG/100ML; MG/100ML; MG/100ML; MG/100ML
125 INJECTION, SOLUTION INTRAVENOUS CONTINUOUS
Status: DISCONTINUED | OUTPATIENT
Start: 2021-04-28 | End: 2021-05-02 | Stop reason: HOSPADM

## 2021-04-28 RX ORDER — LIDOCAINE HYDROCHLORIDE 10 MG/ML
INJECTION, SOLUTION EPIDURAL; INFILTRATION; INTRACAUDAL; PERINEURAL AS NEEDED
Status: DISCONTINUED | OUTPATIENT
Start: 2021-04-28 | End: 2021-04-28

## 2021-04-28 RX ORDER — PROPOFOL 10 MG/ML
INJECTION, EMULSION INTRAVENOUS AS NEEDED
Status: DISCONTINUED | OUTPATIENT
Start: 2021-04-28 | End: 2021-04-28

## 2021-04-28 RX ORDER — SODIUM CHLORIDE 9 MG/ML
25 INJECTION, SOLUTION INTRAVENOUS CONTINUOUS
Status: CANCELLED | OUTPATIENT
Start: 2021-04-28

## 2021-04-28 RX ORDER — DEXAMETHASONE SODIUM PHOSPHATE 10 MG/ML
INJECTION, SOLUTION INTRAMUSCULAR; INTRAVENOUS AS NEEDED
Status: DISCONTINUED | OUTPATIENT
Start: 2021-04-28 | End: 2021-04-28

## 2021-04-28 RX ADMIN — LIDOCAINE HYDROCHLORIDE 50 MG: 10 INJECTION, SOLUTION EPIDURAL; INFILTRATION; INTRACAUDAL; PERINEURAL at 09:35

## 2021-04-28 RX ADMIN — SODIUM CHLORIDE, SODIUM LACTATE, POTASSIUM CHLORIDE, AND CALCIUM CHLORIDE 125 ML/HR: .6; .31; .03; .02 INJECTION, SOLUTION INTRAVENOUS at 08:33

## 2021-04-28 RX ADMIN — SODIUM CHLORIDE, SODIUM LACTATE, POTASSIUM CHLORIDE, AND CALCIUM CHLORIDE: .6; .31; .03; .02 INJECTION, SOLUTION INTRAVENOUS at 09:30

## 2021-04-28 RX ADMIN — Medication 100 MG: at 09:35

## 2021-04-28 RX ADMIN — ONDANSETRON 4 MG: 2 INJECTION INTRAMUSCULAR; INTRAVENOUS at 10:50

## 2021-04-28 RX ADMIN — PROPOFOL 150 MG: 10 INJECTION, EMULSION INTRAVENOUS at 09:35

## 2021-04-28 RX ADMIN — DEXAMETHASONE SODIUM PHOSPHATE 10 MG: 10 INJECTION, SOLUTION INTRAMUSCULAR; INTRAVENOUS at 10:07

## 2021-04-28 RX ADMIN — IOHEXOL 23 ML: 240 INJECTION, SOLUTION INTRATHECAL; INTRAVASCULAR; INTRAVENOUS; ORAL at 10:50

## 2021-04-28 NOTE — ANESTHESIA PREPROCEDURE EVALUATION
Procedure:  ENDOSCOPIC ULTRASOUND (UPPER)  ERCP    Relevant Problems   ANESTHESIA   (-) History of anesthesia complications      Other   (+) Age-related osteoporosis without current pathological fracture   (+) Bile duct abnormality        Physical Exam    Airway    Mallampati score: II  TM Distance: >3 FB  Neck ROM: full     Dental   No notable dental hx     Cardiovascular      Pulmonary      Other Findings        Anesthesia Plan  ASA Score- 2     Anesthesia Type- general with ASA Monitors  Additional Monitors:   Airway Plan: ETT  Plan Factors-Exercise tolerance (METS): >4 METS  Exercise comment: Able to climb two flights of stairs without cardiopulmonary limitation  Chart reviewed  Existing labs reviewed  Patient summary reviewed  Patient is not a current smoker (Quit 17 years ago)  Induction- intravenous  Postoperative Plan-   Planned trial extubation    Informed Consent- Anesthetic plan and risks discussed with patient

## 2021-04-28 NOTE — ANESTHESIA POSTPROCEDURE EVALUATION
Post-Op Assessment Note    CV Status:  Stable  Pain Score: 0    Pain management: adequate     Mental Status:  Alert and awake   Hydration Status:  Euvolemic   PONV Controlled:  Controlled   Airway Patency:  Patent      Post Op Vitals Reviewed: Yes      Staff: CRNA         No complications documented      /80 (04/28/21 1155)    Temp (!) 97 1 °F (36 2 °C) (04/28/21 1150)    Pulse 86 (04/28/21 1155)   Resp 16 (04/28/21 1155)    SpO2 100 % (04/28/21 1155)

## 2021-05-04 ENCOUNTER — TELEPHONE (OUTPATIENT)
Dept: UROLOGY | Facility: MEDICAL CENTER | Age: 66
End: 2021-05-04

## 2021-05-04 NOTE — TELEPHONE ENCOUNTER
Pt of dr Cece Mary seen in [place] office, calling to r/s a cysto appt with him (was scheduled for 5-25-21)  Advised pt  is no longer going to [place] office, she can either schedule with him at [facility] or a different provider at [place]  She prefers [place] so appt scheduled for first available: 8-26-21 with dr Dorita Ling  Please triage if pt needs to be seen sooner for cysto and contact her if appt needs to be moved up

## 2021-05-10 ENCOUNTER — TELEPHONE (OUTPATIENT)
Dept: GASTROENTEROLOGY | Facility: CLINIC | Age: 66
End: 2021-05-10

## 2021-05-10 ENCOUNTER — PREP FOR PROCEDURE (OUTPATIENT)
Dept: GASTROENTEROLOGY | Facility: CLINIC | Age: 66
End: 2021-05-10

## 2021-05-10 DIAGNOSIS — K83.9 BILE DUCT ABNORMALITY: Primary | ICD-10-CM

## 2021-05-10 NOTE — TELEPHONE ENCOUNTER
Pt returning call; trying to see if an appt could be scheduled; pt unable to stay on phone due to her being in with pts at this time; pt would like a call back sometime after 4pm today

## 2021-05-10 NOTE — TELEPHONE ENCOUNTER
Called patient and lvm to [facility] 6 week f/u I did advise on vm I will attempt to call her again at 4 p m

## 2021-05-10 NOTE — TELEPHONE ENCOUNTER
----- Message from Declan Santos MA sent at 5/10/2021  3:07 PM EDT -----  Regarding: FW: Pre-Op/Post-Op Question  Contact: [phone]    ----- Message -----  From: Leonardo Oneal  Sent: 5/10/2021   2:05 PM EDT  To: , #  Subject: Pre-Op/Post-Op Question                          Hello,    I was informed I need to be seen back in 6-8 weeks after my ampullectomy that was performed on 4/28/2021  Do I need to make an appointment or does someone from [facility] contact me to set one up? Please advise      Thank you  Leonidas Turner Fat

## 2021-05-20 NOTE — TELEPHONE ENCOUNTER
Patient evaluated for microhematuria  She does have a h/o tobacco use  Cysto scheduled with CT ptv  Of note UA with micro 3/12/21 was negative for RBC  Rescheduled to 7/6 at 8 am with Dr Ed Barahona  Called and left a detailed message for patient that we were able to move her appt up and encouraged her to call back to confirm this new appt

## 2021-06-08 ENCOUNTER — TELEPHONE (OUTPATIENT)
Dept: GASTROENTEROLOGY | Facility: HOSPITAL | Age: 66
End: 2021-06-08

## 2021-06-09 ENCOUNTER — ANESTHESIA (OUTPATIENT)
Dept: GASTROENTEROLOGY | Facility: HOSPITAL | Age: 66
End: 2021-06-09

## 2021-06-09 ENCOUNTER — ANESTHESIA EVENT (OUTPATIENT)
Dept: GASTROENTEROLOGY | Facility: HOSPITAL | Age: 66
End: 2021-06-09

## 2021-06-09 ENCOUNTER — HOSPITAL ENCOUNTER (OUTPATIENT)
Dept: GASTROENTEROLOGY | Facility: HOSPITAL | Age: 66
Setting detail: OUTPATIENT SURGERY
Discharge: HOME | End: 2021-06-09
Attending: INTERNAL MEDICINE | Admitting: INTERNAL MEDICINE
Payer: COMMERCIAL

## 2021-06-09 VITALS
OXYGEN SATURATION: 98 % | HEIGHT: 63 IN | SYSTOLIC BLOOD PRESSURE: 100 MMHG | DIASTOLIC BLOOD PRESSURE: 53 MMHG | WEIGHT: 117 LBS | BODY MASS INDEX: 20.73 KG/M2 | HEART RATE: 78 BPM | TEMPERATURE: 96.8 F | RESPIRATION RATE: 18 BRPM

## 2021-06-09 DIAGNOSIS — K83.9 BILE DUCT ABNORMALITY: ICD-10-CM

## 2021-06-09 PROCEDURE — 43239 EGD BIOPSY SINGLE/MULTIPLE: CPT | Performed by: INTERNAL MEDICINE

## 2021-06-09 PROCEDURE — 88305 TISSUE EXAM BY PATHOLOGIST: CPT | Performed by: PATHOLOGY

## 2021-06-09 PROCEDURE — 43247 EGD REMOVE FOREIGN BODY: CPT | Performed by: INTERNAL MEDICINE

## 2021-06-09 RX ORDER — LIDOCAINE HYDROCHLORIDE 10 MG/ML
INJECTION, SOLUTION EPIDURAL; INFILTRATION; INTRACAUDAL; PERINEURAL AS NEEDED
Status: DISCONTINUED | OUTPATIENT
Start: 2021-06-09 | End: 2021-06-09

## 2021-06-09 RX ORDER — PROPOFOL 10 MG/ML
INJECTION, EMULSION INTRAVENOUS AS NEEDED
Status: DISCONTINUED | OUTPATIENT
Start: 2021-06-09 | End: 2021-06-09

## 2021-06-09 RX ORDER — SODIUM CHLORIDE 9 MG/ML
INJECTION, SOLUTION INTRAVENOUS CONTINUOUS PRN
Status: DISCONTINUED | OUTPATIENT
Start: 2021-06-09 | End: 2021-06-09

## 2021-06-09 RX ADMIN — LIDOCAINE HYDROCHLORIDE 50 MG: 10 INJECTION, SOLUTION EPIDURAL; INFILTRATION; INTRACAUDAL at 08:53

## 2021-06-09 RX ADMIN — PROPOFOL 100 MG: 10 INJECTION, EMULSION INTRAVENOUS at 08:53

## 2021-06-09 RX ADMIN — SODIUM CHLORIDE: 9 INJECTION, SOLUTION INTRAVENOUS at 08:50

## 2021-06-09 RX ADMIN — PROPOFOL 50 MG: 10 INJECTION, EMULSION INTRAVENOUS at 08:58

## 2021-06-09 RX ADMIN — PROPOFOL 50 MG: 10 INJECTION, EMULSION INTRAVENOUS at 08:54

## 2021-06-09 NOTE — ANESTHESIA PREPROCEDURE EVALUATION
Procedure:  EGD    Relevant Problems   No relevant active problems      CR 0 85, HGB 13 9,     Ampulla lesion       Anesthesia Plan  ASA Score- 2     Anesthesia Type- IV sedation with anesthesia with ASA Monitors  Additional Monitors:   Airway Plan:     Comment: IV sedation,  standard ASA monitors  Risks and benefits discussed with patient; patient consented and agrees to proceed  I saw and evaluated the patient  If seen with CRNA, we have discussed the anesthetic plan and I am in agreement that the plan is appropriate for the patient          Plan Factors-    Induction- intravenous  Postoperative Plan-     Informed Consent- Anesthetic plan and risks discussed with patient  I personally reviewed this patient with the CRNA  Discussed and agreed on the Anesthesia Plan with the CRNA  Christiano Doshi

## 2021-06-09 NOTE — ANESTHESIA POSTPROCEDURE EVALUATION
Post-Op Assessment Note    CV Status:  Stable  Pain Score: 0    Pain management: adequate     Mental Status:  Arousable and sleepy   Hydration Status:  Euvolemic   PONV Controlled:  Controlled   Airway Patency:  Patent      Post Op Vitals Reviewed: Yes      Staff: CRNA, Anesthesiologist         No complications documented      BP 91/53 (06/09/21 0902)    Temp (!) 96 8 °F (36 °C) (06/09/21 0902)    Pulse 71 (06/09/21 0902)   Resp 16 (06/09/21 0902)    SpO2 97 % (06/09/21 0902)

## 2021-07-05 PROCEDURE — 52000 CYSTOURETHROSCOPY: CPT | Performed by: STUDENT IN AN ORGANIZED HEALTH CARE EDUCATION/TRAINING PROGRAM

## 2021-07-05 NOTE — PROGRESS NOTES
Cystoscopy     Date/Time 7/5/2021 5:51 PM     Performed by  Denia Be MD     Authorized by Denia Be MD      Universal Protocol:  Consent: Verbal consent not obtained  Written consent obtained  Risks and benefits: risks, benefits and alternatives were discussed  Consent given by: patient  Time out: Immediately prior to procedure a "time out" was called to verify the correct patient, procedure, equipment, support staff and site/side marked as required  Patient understanding: patient states understanding of the procedure being performed  Patient consent: the patient's understanding of the procedure matches consent given  Procedure consent: procedure consent matches procedure scheduled  Relevant documents: relevant documents present and verified  Test results: test results available and properly labeled  Site marked: the operative site was marked  Patient identity confirmed: verbally with patient        Procedure Details:  Procedure type: cystoscopy    Patient tolerance: Patient tolerated the procedure well with no immediate complications    Additional Procedure Details: Office Cystoscopy Procedure Note    Indication:     Asymptomatic microscopic hematuria    Informed consent   The risks, benefits, complications, treatment options, and expected outcomes were discussed with the patient  The patient concurred with the proposed plan and provided informed consent  Anesthesia  Lidocaine jelly 2%    Antibiotic prophylaxis   None    Procedure  In the presence of a female nurse, the patient was placed in the supine frog-legged position, was prepped and draped in the usual manner using sterile technique, and 2% lidocaine jelly instilled into the urethra  Prior to this pelvic examination showed normal labia majora and minora, a normal vaginal introitus, atrophic quality vaginal mucosa  A 17 F flexible cystoscope was then inserted into the urethra and the urethra and bladder carefully examined    The following findings were noted:    Findings:  Urethra:  Normal  Bladder:  Normal  Ureteral orifices:  Normal  Other findings:  None     Specimens: None                 Complications:    None; patient tolerated the procedure well           Disposition: To home after 30 minute observation  Condition: Stable    Plan:   Patient has now completed a full microscopic hematuria workup including cystoscopy contrast upper tract imaging  Thankfully there are no signs of genitourinary pathology is or malignancies  Reassurance was provided  Patient will be followed by PCP

## 2021-07-06 ENCOUNTER — TELEPHONE (OUTPATIENT)
Dept: UROLOGY | Facility: MEDICAL CENTER | Age: 66
End: 2021-07-06

## 2021-07-06 ENCOUNTER — PROCEDURE VISIT (OUTPATIENT)
Dept: UROLOGY | Facility: CLINIC | Age: 66
End: 2021-07-06
Payer: COMMERCIAL

## 2021-07-06 VITALS
DIASTOLIC BLOOD PRESSURE: 72 MMHG | HEART RATE: 81 BPM | HEIGHT: 63 IN | BODY MASS INDEX: 21.09 KG/M2 | WEIGHT: 119 LBS | SYSTOLIC BLOOD PRESSURE: 118 MMHG

## 2021-07-06 DIAGNOSIS — R31.29 MICROSCOPIC HEMATURIA: Primary | ICD-10-CM

## 2021-07-06 NOTE — TELEPHONE ENCOUNTER
Called and spoke to patient  Patient concerned for blood clot seen on cystoscopy  Patient states Dr Jayesh Valentino told her not to be concerned, that a blood clot is not a concerning thing in the bladder  After reviewing Dr Daisy Khalil note, patient reassured that this is okay, that Dr Jayesh Valentino is not concerned for it  Patient thankful for callback

## 2021-07-19 ENCOUNTER — PATIENT MESSAGE (OUTPATIENT)
Dept: GASTROENTEROLOGY | Facility: CLINIC | Age: 66
End: 2021-07-19

## 2021-07-19 ENCOUNTER — TELEPHONE (OUTPATIENT)
Dept: GASTROENTEROLOGY | Facility: CLINIC | Age: 66
End: 2021-07-19

## 2021-07-19 NOTE — TELEPHONE ENCOUNTER
----- Message from Roseanne Garibay MD sent at 7/8/2021  9:31 AM EDT -----  Keri Dominguez, can you schedule for EGD with me at [facility] in 1-2 months? Thank you!

## 2021-07-19 NOTE — TELEPHONE ENCOUNTER
EGD scheduled on 8/20 at [facility] with [facility]  I gave pt verbal instructions/mailed

## 2021-07-20 NOTE — TELEPHONE ENCOUNTER
Called to verify with pt what she wanted to know  Pt stated she would like to know if she should keep her procedure for August or schedule for September? Please advise

## 2021-08-03 ENCOUNTER — HOSPITAL ENCOUNTER (OUTPATIENT)
Dept: MAMMOGRAPHY | Facility: CLINIC | Age: 66
Discharge: HOME | End: 2021-08-03
Payer: COMMERCIAL

## 2021-08-03 VITALS — HEIGHT: 63 IN | BODY MASS INDEX: 21.09 KG/M2 | WEIGHT: 119 LBS

## 2021-08-03 DIAGNOSIS — Z12.31 ENCOUNTER FOR SCREENING MAMMOGRAM FOR MALIGNANT NEOPLASM OF BREAST: ICD-10-CM

## 2021-08-03 PROCEDURE — 77063 BREAST TOMOSYNTHESIS BI: CPT

## 2021-08-03 PROCEDURE — 77067 SCR MAMMO BI INCL CAD: CPT

## 2021-08-10 ENCOUNTER — OFFICE VISIT (OUTPATIENT)
Dept: DERMATOLOGY | Facility: CLINIC | Age: 66
End: 2021-08-10
Payer: COMMERCIAL

## 2021-08-10 DIAGNOSIS — Z13.89 SCREENING FOR SKIN CONDITION: ICD-10-CM

## 2021-08-10 DIAGNOSIS — L82.1 SEBORRHEIC KERATOSIS: Primary | ICD-10-CM

## 2021-08-10 PROCEDURE — 99213 OFFICE O/P EST LOW 20 MIN: CPT | Performed by: DERMATOLOGY

## 2021-08-10 PROCEDURE — 1036F TOBACCO NON-USER: CPT | Performed by: DERMATOLOGY

## 2021-08-10 NOTE — PROGRESS NOTES
[facility] DERMATOLOGY  [address] [place] [id number]  [phone]  [phone]     MRN: [id number] : 1955  Encounter: [id number]  Patient Information: Piotr Calderon  Chief complaint:  Yearly checkup    History of present illness:  54-year-old female with history of lots of sun exposure presents for overall checkup notes several new growths on her skin no other concerns noted  Past Medical History:   Diagnosis Date    Colon polyp     Medical history reviewed with no changes     Osteoporosis     Thyroglossal cyst      Past Surgical History:   Procedure Laterality Date    BREAST BIOPSY Left 2018    benign    COLONOSCOPY      2017    CYSTOSCOPY  2021    Dr Trish So History   Social History     Substance and Sexual Activity   Alcohol Use Yes    Alcohol/week: 14 0 standard drinks    Types: 14 Cans of beer per week    Comment: coors lite every day     Social History     Substance and Sexual Activity   Drug Use No     Social History     Tobacco Use   Smoking Status Former Smoker   Smokeless Tobacco Never Used   Tobacco Comment    quit 2004     Family History   Problem Relation Age of Onset    Dementia Mother     Diabetes Father     Breast cancer Cousin 39    Breast cancer Cousin 39    Colon cancer Neg Hx     Ovarian cancer Neg Hx     Uterine cancer Neg Hx     Cervical cancer Neg Hx      Meds/Allergies   No Known Allergies    Meds:  Prior to Admission medications    Medication Sig Start Date End Date Taking?  Authorizing Provider   calcium carbonate (OS-CARTER) 600 MG tablet Take 600 mg by mouth 2 (two) times a day with meals   Yes Historical Provider, MD   Cholecalciferol (VITAMIN D3) 1000 units CAPS Take by mouth   Yes Historical Provider, MD   COLLAGEN PO Take 5,000 mcg by mouth daily   Yes Historical Provider, MD   folic acid (FOLVITE) 1 mg tablet Take by mouth daily   Yes Historical Provider, MD   Biotin 10 MG CAPS Take by mouth    Historical Provider, MD       Subjective:     Review of Systems:    General: negative for - chills, fatigue, fever,  weight gain or weight loss  Psychological: negative for - anxiety, behavioral disorder, concentration difficulties, decreased libido, depression, irritability, memory difficulties, mood swings, sleep disturbances or suicidal ideation  ENT: negative for - hearing difficulties , nasal congestion, nasal discharge, oral lesions, sinus pain, sneezing, sore throat  Allergy and Immunology: negative for - hives, insect bite sensitivity,  Hematological and Lymphatic: negative for - bleeding problems, blood clots,bruising, swollen lymph nodes  Endocrine: negative for - hair pattern changes, hot flashes, malaise/lethargy, mood swings, palpitations, polydipsia/polyuria, skin changes, temperature intolerance or unexpected weight change  Respiratory: negative for - cough, hemoptysis, orthopnea, shortness of breath, or wheezing  Cardiovascular: negative for - chest pain, dyspnea on exertion, edema,  Gastrointestinal: negative for - abdominal pain, nausea/vomiting  Genito-Urinary: negative for - dysuria, incontinence, irregular/heavy menses or urinary frequency/urgency  Musculoskeletal: negative for - gait disturbance, joint pain, joint stiffness, joint swelling, muscle pain, muscular weakness  Dermatological:  As in HPI  Neurological: negative for confusion, dizziness, headaches, impaired coordination/balance, memory loss, numbness/tingling, seizures, speech problems, tremors or weakness       Objective:   LMP  (LMP Unknown)     Physical Exam:    General Appearance:    Alert, cooperative, no distress   Head:    Normocephalic, without obvious abnormality, atraumatic           Skin:   A full skin exam was performed including scalp, head scalp, eyes, ears, nose, lips, neck, chest, axilla, abdomen, back, buttocks, bilateral upper extremities, bilateral lower extremities, hands, feet, fingers, toes, fingernails, and toenails normal keratotic papules greasy stuck on appearance nothing else atypical noted on complete exam     Assessment:     1  Seborrheic keratosis     2  Screening for skin condition           Plan:   Seborrheic Keratosis  Patient reasurred these are normal growths we acquire with age no treatment needed  Screening for Dermatologic Disorders: Nothing else of concern noted on complete exam follow up in 1 year       Evette Estevez MD  8/10/2021,11:47 AM    Portions of the record may have been created with voice recognition software   Occasional wrong word or "sound a like" substitutions may have occurred due to the inherent limitations of voice recognition software   Read the chart carefully and recognize, using context, where substitutions have occurred

## 2021-08-19 ENCOUNTER — TELEPHONE (OUTPATIENT)
Dept: GASTROENTEROLOGY | Facility: HOSPITAL | Age: 66
End: 2021-08-19

## 2021-08-20 ENCOUNTER — HOSPITAL ENCOUNTER (OUTPATIENT)
Dept: GASTROENTEROLOGY | Facility: HOSPITAL | Age: 66
Setting detail: OUTPATIENT SURGERY
Discharge: HOME | End: 2021-08-20
Attending: INTERNAL MEDICINE | Admitting: INTERNAL MEDICINE
Payer: COMMERCIAL

## 2021-08-20 ENCOUNTER — PREP FOR PROCEDURE (OUTPATIENT)
Dept: GASTROENTEROLOGY | Facility: CLINIC | Age: 66
End: 2021-08-20

## 2021-08-20 ENCOUNTER — ANESTHESIA EVENT (OUTPATIENT)
Dept: GASTROENTEROLOGY | Facility: HOSPITAL | Age: 66
End: 2021-08-20

## 2021-08-20 ENCOUNTER — ANESTHESIA (OUTPATIENT)
Dept: GASTROENTEROLOGY | Facility: HOSPITAL | Age: 66
End: 2021-08-20

## 2021-08-20 VITALS
SYSTOLIC BLOOD PRESSURE: 117 MMHG | HEART RATE: 87 BPM | RESPIRATION RATE: 16 BRPM | DIASTOLIC BLOOD PRESSURE: 73 MMHG | TEMPERATURE: 96.4 F | OXYGEN SATURATION: 97 % | HEIGHT: 63 IN | BODY MASS INDEX: 20.73 KG/M2 | WEIGHT: 117 LBS

## 2021-08-20 DIAGNOSIS — D13.5 AMPULLARY ADENOMA: Primary | ICD-10-CM

## 2021-08-20 DIAGNOSIS — D13.5 AMPULLARY ADENOMA: ICD-10-CM

## 2021-08-20 PROCEDURE — 88305 TISSUE EXAM BY PATHOLOGIST: CPT | Performed by: PATHOLOGY

## 2021-08-20 PROCEDURE — 43239 EGD BIOPSY SINGLE/MULTIPLE: CPT | Performed by: INTERNAL MEDICINE

## 2021-08-20 RX ORDER — LIDOCAINE HYDROCHLORIDE 10 MG/ML
INJECTION, SOLUTION EPIDURAL; INFILTRATION; INTRACAUDAL; PERINEURAL AS NEEDED
Status: DISCONTINUED | OUTPATIENT
Start: 2021-08-20 | End: 2021-08-20

## 2021-08-20 RX ORDER — PROPOFOL 10 MG/ML
INJECTION, EMULSION INTRAVENOUS AS NEEDED
Status: DISCONTINUED | OUTPATIENT
Start: 2021-08-20 | End: 2021-08-20

## 2021-08-20 RX ORDER — SODIUM CHLORIDE, SODIUM LACTATE, POTASSIUM CHLORIDE, CALCIUM CHLORIDE 600; 310; 30; 20 MG/100ML; MG/100ML; MG/100ML; MG/100ML
INJECTION, SOLUTION INTRAVENOUS CONTINUOUS PRN
Status: DISCONTINUED | OUTPATIENT
Start: 2021-08-20 | End: 2021-08-20

## 2021-08-20 RX ADMIN — PROPOFOL 40 MG: 10 INJECTION, EMULSION INTRAVENOUS at 11:52

## 2021-08-20 RX ADMIN — PROPOFOL 100 MG: 10 INJECTION, EMULSION INTRAVENOUS at 11:50

## 2021-08-20 RX ADMIN — SODIUM CHLORIDE, SODIUM LACTATE, POTASSIUM CHLORIDE, AND CALCIUM CHLORIDE: .6; .31; .03; .02 INJECTION, SOLUTION INTRAVENOUS at 11:49

## 2021-08-20 RX ADMIN — PROPOFOL 50 MG: 10 INJECTION, EMULSION INTRAVENOUS at 11:58

## 2021-08-20 RX ADMIN — PROPOFOL 60 MG: 10 INJECTION, EMULSION INTRAVENOUS at 11:55

## 2021-08-20 RX ADMIN — PROPOFOL 50 MG: 10 INJECTION, EMULSION INTRAVENOUS at 12:00

## 2021-08-20 RX ADMIN — LIDOCAINE HYDROCHLORIDE 50 MG: 10 INJECTION, SOLUTION EPIDURAL; INFILTRATION; INTRACAUDAL; PERINEURAL at 11:50

## 2021-08-20 NOTE — H&P
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  Not on any anti-platelet agents or anticoagulants  REVIEW OF SYSTEMS: Per the HPI, and otherwise unremarkable  Historical Information   Past Medical History:   Diagnosis Date    Colon polyp     Medical history reviewed with no changes     Osteoporosis     Thyroglossal cyst      Past Surgical History:   Procedure Laterality Date    BREAST BIOPSY Left 2018    benign    COLONOSCOPY      2017    CYSTOSCOPY  07/06/2021    Dr Harris [place] History   Social History     Substance and Sexual Activity   Alcohol Use Yes    Alcohol/week: 14 0 standard drinks    Types: 14 Cans of beer per week    Comment: coors lite every day     Social History     Substance and Sexual Activity   Drug Use No     Social History     Tobacco Use   Smoking Status Former Smoker   Smokeless Tobacco Never Used   Tobacco Comment    quit 2004     Family History   Problem Relation Age of Onset    Dementia Mother     Diabetes Father     Breast cancer Cousin 39    Breast cancer Cousin 39    Colon cancer Neg Hx     Ovarian cancer Neg Hx     Uterine cancer Neg Hx     Cervical cancer Neg Hx        Meds/Allergies   (Not in a hospital admission)    No current facility-administered medications for this encounter  No Known Allergies      PHYSICAL EXAM:    Objective   Blood pressure 135/74, pulse 73, temperature 98 °F (36 7 °C), temperature source Tympanic, resp  rate 16, height 5' 3" (1 6 m), weight 53 1 kg (117 lb), SpO2 99 %, not currently breastfeeding  Body mass index is 20 73 kg/m²  Gen: NAD  CV: RRR  CHEST: Clear  ABD: Soft, NT/ND  EXT: No edema      ASSESSMENT AND PLAN:  This is a 78 y o  year old female here for EGD with side-viewing scope, and she is stable and optimized for her procedure  WALT Crouch  Gastroenterology Fellow  [facility]  Available on [email or web address]

## 2021-08-20 NOTE — ANESTHESIA POSTPROCEDURE EVALUATION
Post-Op Assessment Note    CV Status:  Stable  Pain Score: 0    Pain management: adequate     Mental Status:  Awake and sleepy   Hydration Status:  Euvolemic   PONV Controlled:  Controlled   Airway Patency:  Patent      Post Op Vitals Reviewed: Yes      Staff: CRNA, Anesthesiologist         No complications documented      /64 (08/20/21 1206)    Temp (!) 96 4 °F (35 8 °C) (08/20/21 1206)    Pulse 71 (08/20/21 1206)   Resp 18 (08/20/21 1206)    SpO2 99 % (08/20/21 1206)

## 2021-08-20 NOTE — ANESTHESIA PREPROCEDURE EVALUATION
Procedure:  EGD    Relevant Problems   ANESTHESIA   (-) PONV (postoperative nausea and vomiting)      CARDIO   (-) HTN (hypertension)      ENDO   (-) Diabetes mellitus type 1 (HCC)   (-) Diabetes mellitus, type 2 (HCC)      PULMONARY   (-) Asthma   (-) Oxygen dependent   (-) Shortness of breath   (-) Sleep apnea   (-) URI (upper respiratory infection)        Physical Exam    Airway    Mallampati score: I  TM Distance: >3 FB  Neck ROM: full     Dental   No notable dental hx     Cardiovascular  Rhythm: regular, Rate: normal,     Pulmonary  Breath sounds clear to auscultation,     Other Findings        Anesthesia Plan  ASA Score- 2     Anesthesia Type- IV sedation with anesthesia with ASA Monitors  Additional Monitors:   Airway Plan:           Plan Factors-Exercise tolerance (METS): >4 METS  Chart reviewed  Existing labs reviewed  Patient summary reviewed  Patient is not a current smoker  Patient did not smoke on day of surgery  Induction-     Postoperative Plan-     Informed Consent-         3 12 21  Potassium 4 0  Cr 0 85  Npo appropriate  Standrad monitors  /

## 2021-08-25 ENCOUNTER — TELEPHONE (OUTPATIENT)
Dept: GASTROENTEROLOGY | Facility: CLINIC | Age: 66
End: 2021-08-25

## 2021-08-25 NOTE — TELEPHONE ENCOUNTER
Called patient and notified her of dysplastic cells and background of adenoma on biopsies of ampullectomy site  Will repeat upper endoscopy with further resection and treatment of ampullary adenoma in 1 month  Schedulers patient will need upper endoscopy with side-viewing endoscope at [facility] in 3-4 weeks    Okay to use advanced time and schedule for 45 minuutes

## 2021-08-26 NOTE — TELEPHONE ENCOUNTER
EGD scheduled on 10/4 at [facility] with [address] [place] [address]  I gave pt verbal instructions/mailed

## 2021-10-01 ENCOUNTER — TELEPHONE (OUTPATIENT)
Dept: GASTROENTEROLOGY | Facility: HOSPITAL | Age: 66
End: 2021-10-01

## 2021-10-04 ENCOUNTER — HOSPITAL ENCOUNTER (OUTPATIENT)
Dept: GASTROENTEROLOGY | Facility: HOSPITAL | Age: 66
Setting detail: OUTPATIENT SURGERY
Discharge: HOME | End: 2021-10-04
Attending: INTERNAL MEDICINE | Admitting: INTERNAL MEDICINE
Payer: COMMERCIAL

## 2021-10-04 ENCOUNTER — ANESTHESIA EVENT (OUTPATIENT)
Dept: GASTROENTEROLOGY | Facility: HOSPITAL | Age: 66
End: 2021-10-04

## 2021-10-04 ENCOUNTER — ANESTHESIA (OUTPATIENT)
Dept: GASTROENTEROLOGY | Facility: HOSPITAL | Age: 66
End: 2021-10-04

## 2021-10-04 VITALS
BODY MASS INDEX: 20.9 KG/M2 | SYSTOLIC BLOOD PRESSURE: 140 MMHG | RESPIRATION RATE: 16 BRPM | DIASTOLIC BLOOD PRESSURE: 82 MMHG | HEART RATE: 81 BPM | OXYGEN SATURATION: 99 % | WEIGHT: 118 LBS | TEMPERATURE: 97.7 F

## 2021-10-04 DIAGNOSIS — D13.5 AMPULLARY ADENOMA: ICD-10-CM

## 2021-10-04 PROCEDURE — 88305 TISSUE EXAM BY PATHOLOGIST: CPT | Performed by: PATHOLOGY

## 2021-10-04 PROCEDURE — 43239 EGD BIOPSY SINGLE/MULTIPLE: CPT | Performed by: INTERNAL MEDICINE

## 2021-10-04 RX ORDER — PROPOFOL 10 MG/ML
INJECTION, EMULSION INTRAVENOUS AS NEEDED
Status: DISCONTINUED | OUTPATIENT
Start: 2021-10-04 | End: 2021-10-04

## 2021-10-04 RX ORDER — PROPOFOL 10 MG/ML
INJECTION, EMULSION INTRAVENOUS CONTINUOUS PRN
Status: DISCONTINUED | OUTPATIENT
Start: 2021-10-04 | End: 2021-10-04

## 2021-10-04 RX ORDER — SODIUM CHLORIDE 9 MG/ML
INJECTION, SOLUTION INTRAVENOUS CONTINUOUS PRN
Status: DISCONTINUED | OUTPATIENT
Start: 2021-10-04 | End: 2021-10-04

## 2021-10-04 RX ADMIN — SODIUM CHLORIDE: 0.9 INJECTION, SOLUTION INTRAVENOUS at 13:12

## 2021-10-04 RX ADMIN — PROPOFOL 100 MCG/KG/MIN: 10 INJECTION, EMULSION INTRAVENOUS at 13:42

## 2021-10-04 RX ADMIN — LIDOCAINE HYDROCHLORIDE 100 MG: 20 INJECTION INTRAVENOUS at 13:41

## 2021-10-04 RX ADMIN — PROPOFOL 100 MG: 10 INJECTION, EMULSION INTRAVENOUS at 13:42

## 2021-10-26 ENCOUNTER — ANNUAL EXAM (OUTPATIENT)
Dept: OBGYN CLINIC | Age: 66
End: 2021-10-26
Payer: COMMERCIAL

## 2021-10-26 VITALS
DIASTOLIC BLOOD PRESSURE: 80 MMHG | WEIGHT: 122 LBS | BODY MASS INDEX: 21.62 KG/M2 | SYSTOLIC BLOOD PRESSURE: 110 MMHG | HEIGHT: 63 IN

## 2021-10-26 DIAGNOSIS — Z01.419 ENCOUNTER FOR GYNECOLOGICAL EXAMINATION WITHOUT ABNORMAL FINDING: Primary | ICD-10-CM

## 2021-10-26 DIAGNOSIS — M81.0 AGE-RELATED OSTEOPOROSIS WITHOUT CURRENT PATHOLOGICAL FRACTURE: ICD-10-CM

## 2021-10-26 DIAGNOSIS — N95.2 ATROPHIC VAGINITIS: ICD-10-CM

## 2021-10-26 DIAGNOSIS — Z78.0 ASYMPTOMATIC POSTMENOPAUSAL STATUS: ICD-10-CM

## 2021-10-26 PROBLEM — R79.89 ELEVATED LIVER FUNCTION TESTS: Status: ACTIVE | Noted: 2019-08-01

## 2021-10-26 PROBLEM — Z00.00 HEALTH CARE MAINTENANCE: Status: RESOLVED | Noted: 2020-11-11 | Resolved: 2021-10-26

## 2021-10-26 PROBLEM — K63.5 COLON POLYP: Status: ACTIVE | Noted: 2020-07-14

## 2021-10-26 PROBLEM — H91.93 BILATERAL HEARING LOSS: Status: ACTIVE | Noted: 2017-11-15

## 2021-10-26 PROBLEM — E78.00 PURE HYPERCHOLESTEROLEMIA: Status: ACTIVE | Noted: 2019-08-01

## 2021-10-26 PROBLEM — Z12.39 BREAST SCREENING: Status: RESOLVED | Noted: 2020-11-11 | Resolved: 2021-10-26

## 2021-10-26 PROBLEM — D12.6 TUBULAR ADENOMA OF COLON: Status: ACTIVE | Noted: 2020-07-17

## 2021-10-26 PROBLEM — M54.2 NECK PAIN: Status: ACTIVE | Noted: 2019-03-11

## 2021-10-26 PROBLEM — Z12.11 COLON CANCER SCREENING: Status: RESOLVED | Noted: 2020-11-11 | Resolved: 2021-10-26

## 2021-10-26 PROBLEM — M19.91 PRIMARY OSTEOARTHRITIS: Status: ACTIVE | Noted: 2017-11-14

## 2021-10-26 PROCEDURE — G0101 CA SCREEN;PELVIC/BREAST EXAM: HCPCS | Performed by: NURSE PRACTITIONER

## 2021-10-26 RX ORDER — ESTRADIOL 0.1 MG/G
CREAM VAGINAL
Qty: 42.5 G | Refills: 1 | Status: SHIPPED | OUTPATIENT
Start: 2021-10-26

## 2021-10-27 ENCOUNTER — TELEPHONE (OUTPATIENT)
Dept: OBGYN CLINIC | Facility: CLINIC | Age: 66
End: 2021-10-27

## 2021-11-15 ENCOUNTER — OFFICE VISIT (OUTPATIENT)
Dept: INTERNAL MEDICINE CLINIC | Facility: CLINIC | Age: 66
End: 2021-11-15
Payer: COMMERCIAL

## 2021-11-15 VITALS
DIASTOLIC BLOOD PRESSURE: 82 MMHG | SYSTOLIC BLOOD PRESSURE: 126 MMHG | TEMPERATURE: 97 F | HEART RATE: 77 BPM | HEIGHT: 63 IN | OXYGEN SATURATION: 93 % | RESPIRATION RATE: 14 BRPM | WEIGHT: 121 LBS | BODY MASS INDEX: 21.44 KG/M2

## 2021-11-15 DIAGNOSIS — Z00.00 HEALTH CARE MAINTENANCE: Primary | ICD-10-CM

## 2021-11-15 PROCEDURE — 99214 OFFICE O/P EST MOD 30 MIN: CPT | Performed by: INTERNAL MEDICINE

## 2021-11-15 PROCEDURE — 1160F RVW MEDS BY RX/DR IN RCRD: CPT | Performed by: INTERNAL MEDICINE

## 2021-11-15 PROCEDURE — 1036F TOBACCO NON-USER: CPT | Performed by: INTERNAL MEDICINE

## 2021-11-15 PROCEDURE — 3008F BODY MASS INDEX DOCD: CPT | Performed by: INTERNAL MEDICINE

## 2021-11-15 RX ORDER — IBUPROFEN 800 MG/1
800 TABLET ORAL
COMMUNITY
Start: 2021-09-03 | End: 2022-03-14

## 2021-11-15 RX ORDER — AMOXICILLIN 500 MG/1
500 TABLET, FILM COATED ORAL EVERY 8 HOURS
COMMUNITY
Start: 2021-09-03 | End: 2021-11-15

## 2021-11-15 RX ORDER — HYDROCODONE BITARTRATE AND ACETAMINOPHEN 5; 325 MG/1; MG/1
1 TABLET ORAL
COMMUNITY
Start: 2021-09-03 | End: 2022-03-14

## 2021-11-15 RX ORDER — CHLORHEXIDINE GLUCONATE 0.12 MG/ML
RINSE ORAL
COMMUNITY
Start: 2021-09-03 | End: 2022-03-14

## 2021-11-22 ENCOUNTER — APPOINTMENT (OUTPATIENT)
Dept: LAB | Facility: CLINIC | Age: 66
End: 2021-11-22
Payer: COMMERCIAL

## 2021-11-22 DIAGNOSIS — Z00.00 HEALTH CARE MAINTENANCE: ICD-10-CM

## 2021-11-22 LAB
ALBUMIN SERPL BCP-MCNC: 3.7 G/DL (ref 3.5–5)
ALP SERPL-CCNC: 42 U/L (ref 46–116)
ALT SERPL W P-5'-P-CCNC: 28 U/L (ref 12–78)
ANION GAP SERPL CALCULATED.3IONS-SCNC: 5 MMOL/L (ref 4–13)
AST SERPL W P-5'-P-CCNC: 24 U/L (ref 5–45)
BACTERIA UR QL AUTO: ABNORMAL /HPF
BASOPHILS # BLD AUTO: 0.06 THOUSANDS/ΜL (ref 0–0.1)
BASOPHILS NFR BLD AUTO: 1 % (ref 0–1)
BILIRUB SERPL-MCNC: 0.93 MG/DL (ref 0.2–1)
BILIRUB UR QL STRIP: NEGATIVE
BUN SERPL-MCNC: 18 MG/DL (ref 5–25)
CALCIUM SERPL-MCNC: 9.5 MG/DL (ref 8.3–10.1)
CHLORIDE SERPL-SCNC: 104 MMOL/L (ref 100–108)
CHOLEST SERPL-MCNC: 219 MG/DL
CLARITY UR: CLEAR
CO2 SERPL-SCNC: 28 MMOL/L (ref 21–32)
COLOR UR: YELLOW
CREAT SERPL-MCNC: 0.88 MG/DL (ref 0.6–1.3)
EOSINOPHIL # BLD AUTO: 0.41 THOUSAND/ΜL (ref 0–0.61)
EOSINOPHIL NFR BLD AUTO: 5 % (ref 0–6)
ERYTHROCYTE [DISTWIDTH] IN BLOOD BY AUTOMATED COUNT: 13.4 % (ref 11.6–15.1)
GFR SERPL CREATININE-BSD FRML MDRD: 68 ML/MIN/1.73SQ M
GLUCOSE P FAST SERPL-MCNC: 91 MG/DL (ref 65–99)
GLUCOSE UR STRIP-MCNC: NEGATIVE MG/DL
HCT VFR BLD AUTO: 42.6 % (ref 34.8–46.1)
HDLC SERPL-MCNC: 79 MG/DL
HGB BLD-MCNC: 13.7 G/DL (ref 11.5–15.4)
HGB UR QL STRIP.AUTO: ABNORMAL
HYALINE CASTS #/AREA URNS LPF: ABNORMAL /LPF
IMM GRANULOCYTES # BLD AUTO: 0.02 THOUSAND/UL (ref 0–0.2)
IMM GRANULOCYTES NFR BLD AUTO: 0 % (ref 0–2)
KETONES UR STRIP-MCNC: NEGATIVE MG/DL
LDLC SERPL CALC-MCNC: 122 MG/DL (ref 0–100)
LEUKOCYTE ESTERASE UR QL STRIP: NEGATIVE
LYMPHOCYTES # BLD AUTO: 2.09 THOUSANDS/ΜL (ref 0.6–4.47)
LYMPHOCYTES NFR BLD AUTO: 27 % (ref 14–44)
MCH RBC QN AUTO: 29.1 PG (ref 26.8–34.3)
MCHC RBC AUTO-ENTMCNC: 32.2 G/DL (ref 31.4–37.4)
MCV RBC AUTO: 90 FL (ref 82–98)
MONOCYTES # BLD AUTO: 0.72 THOUSAND/ΜL (ref 0.17–1.22)
MONOCYTES NFR BLD AUTO: 9 % (ref 4–12)
NEUTROPHILS # BLD AUTO: 4.56 THOUSANDS/ΜL (ref 1.85–7.62)
NEUTS SEG NFR BLD AUTO: 58 % (ref 43–75)
NITRITE UR QL STRIP: NEGATIVE
NON-SQ EPI CELLS URNS QL MICRO: ABNORMAL /HPF
NRBC BLD AUTO-RTO: 0 /100 WBCS
PH UR STRIP.AUTO: 7.5 [PH]
PLATELET # BLD AUTO: 271 THOUSANDS/UL (ref 149–390)
PMV BLD AUTO: 9.9 FL (ref 8.9–12.7)
POTASSIUM SERPL-SCNC: 4.6 MMOL/L (ref 3.5–5.3)
PROT SERPL-MCNC: 7.3 G/DL (ref 6.4–8.2)
PROT UR STRIP-MCNC: NEGATIVE MG/DL
RBC # BLD AUTO: 4.71 MILLION/UL (ref 3.81–5.12)
RBC #/AREA URNS AUTO: ABNORMAL /HPF
SODIUM SERPL-SCNC: 137 MMOL/L (ref 136–145)
SP GR UR STRIP.AUTO: 1.01 (ref 1–1.03)
TRIGL SERPL-MCNC: 91 MG/DL
TSH SERPL DL<=0.05 MIU/L-ACNC: 2.43 UIU/ML (ref 0.36–3.74)
UROBILINOGEN UR QL STRIP.AUTO: 0.2 E.U./DL
WBC # BLD AUTO: 7.86 THOUSAND/UL (ref 4.31–10.16)
WBC #/AREA URNS AUTO: ABNORMAL /HPF

## 2021-11-22 PROCEDURE — 80061 LIPID PANEL: CPT

## 2021-11-22 PROCEDURE — 84443 ASSAY THYROID STIM HORMONE: CPT

## 2021-11-22 PROCEDURE — 85025 COMPLETE CBC W/AUTO DIFF WBC: CPT

## 2021-11-22 PROCEDURE — 81001 URINALYSIS AUTO W/SCOPE: CPT | Performed by: INTERNAL MEDICINE

## 2021-11-22 PROCEDURE — 80053 COMPREHEN METABOLIC PANEL: CPT

## 2021-11-22 PROCEDURE — 36415 COLL VENOUS BLD VENIPUNCTURE: CPT

## 2021-11-24 ENCOUNTER — PATIENT MESSAGE (OUTPATIENT)
Dept: UROLOGY | Facility: CLINIC | Age: 66
End: 2021-11-24

## 2021-11-30 ENCOUNTER — HOSPITAL ENCOUNTER (OUTPATIENT)
Dept: ULTRASOUND IMAGING | Facility: CLINIC | Age: 66
Discharge: HOME | End: 2021-11-30
Payer: COMMERCIAL

## 2021-11-30 DIAGNOSIS — R31.29 MICROSCOPIC HEMATURIA: ICD-10-CM

## 2021-11-30 PROCEDURE — 76770 US EXAM ABDO BACK WALL COMP: CPT

## 2021-12-02 ENCOUNTER — TELEPHONE (OUTPATIENT)
Dept: INTERNAL MEDICINE CLINIC | Facility: CLINIC | Age: 66
End: 2021-12-02

## 2022-02-07 ENCOUNTER — TELEPHONE (OUTPATIENT)
Dept: GASTROENTEROLOGY | Facility: CLINIC | Age: 67
End: 2022-02-07

## 2022-02-08 ENCOUNTER — PREP FOR PROCEDURE (OUTPATIENT)
Dept: GASTROENTEROLOGY | Facility: CLINIC | Age: 67
End: 2022-02-08

## 2022-02-08 DIAGNOSIS — D13.5 AMPULLARY ADENOMA: Primary | ICD-10-CM

## 2022-02-08 NOTE — TELEPHONE ENCOUNTER
Scheduled date of EGD(as of today): 3/21/22  Physician performing EGD: Dr Enrique Wilkinson  Location of EGD: [facility]   Instructions reviewed with patient by: Ari Fine  Clearances:  n/a

## 2022-03-14 ENCOUNTER — CONSULT (OUTPATIENT)
Dept: INTERNAL MEDICINE CLINIC | Facility: CLINIC | Age: 67
End: 2022-03-14
Payer: COMMERCIAL

## 2022-03-14 VITALS
SYSTOLIC BLOOD PRESSURE: 120 MMHG | HEART RATE: 79 BPM | BODY MASS INDEX: 21.62 KG/M2 | OXYGEN SATURATION: 99 % | DIASTOLIC BLOOD PRESSURE: 68 MMHG | HEIGHT: 63 IN | WEIGHT: 122 LBS | TEMPERATURE: 98.3 F

## 2022-03-14 DIAGNOSIS — Z23 ENCOUNTER FOR IMMUNIZATION: Primary | ICD-10-CM

## 2022-03-14 PROCEDURE — 99213 OFFICE O/P EST LOW 20 MIN: CPT | Performed by: INTERNAL MEDICINE

## 2022-03-14 PROCEDURE — G0439 PPPS, SUBSEQ VISIT: HCPCS | Performed by: INTERNAL MEDICINE

## 2022-03-14 PROCEDURE — 1101F PT FALLS ASSESS-DOCD LE1/YR: CPT | Performed by: INTERNAL MEDICINE

## 2022-03-14 RX ORDER — OFLOXACIN 3 MG/ML
SOLUTION/ DROPS OPHTHALMIC
COMMUNITY
Start: 2022-02-21

## 2022-03-14 RX ORDER — PREDNISOLONE ACETATE 10 MG/ML
SUSPENSION/ DROPS OPHTHALMIC
COMMUNITY
Start: 2022-02-21

## 2022-03-14 NOTE — PROGRESS NOTES
Assessment and Plan:     Problem List Items Addressed This Visit     None      Visit Diagnoses     Encounter for immunization    -  Primary          Depression Screening and Follow-up Plan: Patient was screened for depression during today's encounter  They screened negative with a PHQ-2 score of 0  Preventive health issues were discussed with patient, and age appropriate screening tests were ordered as noted in patient's After Visit Summary  Personalized health advice and appropriate referrals for health education or preventive services given if needed, as noted in patient's After Visit Summary       History of Present Illness:     Patient presents for Medicare Annual Wellness visit    Patient Care Team:  Jerrod Shaffer MD as PCP - General (Internal Medicine)  Jerrod Shaffer MD as PCP - [address] [place] [address] (RTE)  Jerrod Shaffer MD as PCP - PCP-[facility] (RTE)  MD Eren Malave DO (Gastroenterology)     Problem List:     Patient Active Problem List   Diagnosis    Other microscopic hematuria    Bile duct abnormality    Tubular adenoma of colon    Seborrheic keratosis    Pure hypercholesterolemia    Osteoporosis without current pathological fracture    Notalgia paresthetica    Neck pain    Primary osteoarthritis    Colon polyp    Bilateral hearing loss    Elevated liver function tests    Asymptomatic postmenopausal status    Atrophic vaginitis      Past Medical and Surgical History:     Past Medical History:   Diagnosis Date    Colon polyp     Medical history reviewed with no changes     Osteoporosis     Thyroglossal cyst      Past Surgical History:   Procedure Laterality Date    BREAST BIOPSY Left 2018    benign    COLONOSCOPY      2017    CYSTOSCOPY  07/06/2021    Dr Nathen Claude        Family History:     Family History   Problem Relation Age of Onset    Dementia Mother     Diabetes Father     Breast cancer Cousin 39    Breast cancer Cousin 39    Colon cancer Neg Hx     Ovarian cancer Neg Hx     Uterine cancer Neg Hx     Cervical cancer Neg Hx       Social History:     Social History     Socioeconomic History    Marital status:      Spouse name: None    Number of children: None    Years of education: None    Highest education level: None   Occupational History    None   Tobacco Use    Smoking status: Former Smoker    Smokeless tobacco: Never Used    Tobacco comment: quit 2004   Vaping Use    Vaping Use: Never used   Substance and Sexual Activity    Alcohol use:  Yes     Alcohol/week: 14 0 standard drinks     Types: 14 Cans of beer per week     Comment: coors lite every day    Drug use: Not Currently    Sexual activity: Yes     Partners: Male     Birth control/protection: Post-menopausal   Other Topics Concern    None   Social History Narrative    None     Social Determinants of Health     Financial Resource Strain: Not on file   Food Insecurity: Not on file   Transportation Needs: Not on file   Physical Activity: Not on file   Stress: Not on file   Social Connections: Not on file   Intimate Partner Violence: Not on file   Housing Stability: Not on file      Medications and Allergies:     Current Outpatient Medications   Medication Sig Dispense Refill    BIOTIN PO Take by mouth      calcium carbonate (OS-CARTER) 600 MG tablet Take 600 mg by mouth 2 (two) times a day with meals      Cholecalciferol (VITAMIN D3) 1000 units CAPS Take by mouth      COLLAGEN PO Take 5,000 mcg by mouth daily      estradiol (ESTRACE) 0 1 mg/g vaginal cream 0 5 gram intravaginally twice weekly 44 4 g 1    folic acid (FOLVITE) 1 mg tablet Take by mouth daily      ofloxacin (OCUFLOX) 0 3 % ophthalmic solution INSTILL ONE DROP INTO THE LEFT EYE FOUR TIMES A DAY AS DIRECTED, START THREE DAYS PRIOR TO SURGERY IN OPERATIVE EYE      prednisoLONE acetate (PRED FORTE) 1 % ophthalmic suspension INSTILL 1 DROP INTO LEFT EYE 4 TIMES DAILY No current facility-administered medications for this visit  No Known Allergies   Immunizations:     Immunization History   Administered Date(s) Administered    INFLUENZA 09/30/2014    Pneumococcal Conjugate 13-Valent 08/01/2019    Td (adult), adsorbed 05/28/2002    Tdap 02/14/2013      Health Maintenance:         Topic Date Due    Breast Cancer Screening: Mammogram  08/03/2022    Colorectal Cancer Screening  07/13/2025    Hepatitis C Screening  Completed         Topic Date Due    COVID-19 Vaccine (1) Never done    Pneumococcal Vaccine: 65+ Years (2 of 2 - PPSV23) 08/01/2020    Influenza Vaccine (1) 09/01/2021      Medicare Health Risk Assessment:     /68 (BP Location: Left arm, Patient Position: Sitting, Cuff Size: Standard)   Pulse 79   Temp 98 3 °F (36 8 °C) (Temporal)   Ht 5' 3" (1 6 m)   Wt 55 3 kg (122 lb)   LMP  (LMP Unknown)   SpO2 99%   BMI 21 61 kg/m²      Stewart Carrel is here for her Subsequent Wellness visit  Last Medicare Wellness visit information reviewed, patient interviewed and updates made to the record today  Health Risk Assessment:   Patient rates overall health as very good  Patient feels that their physical health rating is same  Patient is very satisfied with their life  Eyesight was rated as same  Hearing was rated as same  Patient feels that their emotional and mental health rating is same  Patients states they are never, rarely angry  Patient states they are never, rarely unusually tired/fatigued  Pain experienced in the last 7 days has been none  Patient states that she has experienced no weight loss or gain in last 6 months  Depression Screening:   PHQ-2 Score: 0      Fall Risk Screening: In the past year, patient has experienced: no history of falling in past year      Urinary Incontinence Screening:   Patient has not leaked urine accidently in the last six months       Home Safety:  Patient does not have trouble with stairs inside or outside of their home  Patient has working smoke alarms and has working carbon monoxide detector  Home safety hazards include: none  Nutrition:   Current diet is Regular  Medications:   Patient is currently taking over-the-counter supplements  OTC medications include: see medication list  Patient is able to manage medications  Activities of Daily Living (ADLs)/Instrumental Activities of Daily Living (IADLs):   Walk and transfer into and out of bed and chair?: Yes  Dress and groom yourself?: Yes    Bathe or shower yourself?: Yes    Feed yourself?  Yes  Do your laundry/housekeeping?: Yes  Manage your money, pay your bills and track your expenses?: Yes  Make your own meals?: Yes    Do your own shopping?: Yes    Advance Care Planning:   Living will: Yes    Advanced directive: Yes      Cognitive Screening:   Provider or family/friend/caregiver concerned regarding cognition?: No    PREVENTIVE SCREENINGS      Cardiovascular Screening:    General: Screening Not Indicated and History Lipid Disorder      Diabetes Screening:     General: Screening Current      Colorectal Cancer Screening:     General: Screening Current      Breast Cancer Screening:     General: Screening Current      Cervical Cancer Screening:    General: Screening Not Indicated      Osteoporosis Screening:    General: Screening Not Indicated and History Osteoporosis      Abdominal Aortic Aneurysm (AAA) Screening:        General: Screening Not Indicated      Lung Cancer Screening:     General: Screening Not Indicated      Hepatitis C Screening:    General: Screening Current    Screening, Brief Intervention, and Referral to Treatment (SBIRT)    Screening  Typical number of drinks in a day: 2      Joe He MD

## 2022-03-20 ENCOUNTER — ANESTHESIA (OUTPATIENT)
Dept: ANESTHESIOLOGY | Facility: HOSPITAL | Age: 67
End: 2022-03-20

## 2022-03-20 ENCOUNTER — ANESTHESIA EVENT (OUTPATIENT)
Dept: ANESTHESIOLOGY | Facility: HOSPITAL | Age: 67
End: 2022-03-20

## 2022-03-21 ENCOUNTER — ANESTHESIA (OUTPATIENT)
Dept: GASTROENTEROLOGY | Facility: HOSPITAL | Age: 67
End: 2022-03-21

## 2022-03-21 ENCOUNTER — ANESTHESIA EVENT (OUTPATIENT)
Dept: GASTROENTEROLOGY | Facility: HOSPITAL | Age: 67
End: 2022-03-21

## 2022-03-21 ENCOUNTER — HOSPITAL ENCOUNTER (OUTPATIENT)
Dept: GASTROENTEROLOGY | Facility: HOSPITAL | Age: 67
Setting detail: OUTPATIENT SURGERY
Discharge: HOME | End: 2022-03-21
Attending: INTERNAL MEDICINE
Payer: COMMERCIAL

## 2022-03-21 VITALS
HEART RATE: 98 BPM | SYSTOLIC BLOOD PRESSURE: 136 MMHG | RESPIRATION RATE: 16 BRPM | TEMPERATURE: 98.2 F | DIASTOLIC BLOOD PRESSURE: 81 MMHG | OXYGEN SATURATION: 99 % | HEIGHT: 63 IN | BODY MASS INDEX: 21.61 KG/M2

## 2022-03-21 DIAGNOSIS — D13.5 AMPULLARY ADENOMA: ICD-10-CM

## 2022-03-21 PROCEDURE — 43239 EGD BIOPSY SINGLE/MULTIPLE: CPT | Performed by: INTERNAL MEDICINE

## 2022-03-21 PROCEDURE — 88305 TISSUE EXAM BY PATHOLOGIST: CPT | Performed by: PATHOLOGY

## 2022-03-21 RX ORDER — GLYCOPYRROLATE 0.2 MG/ML
INJECTION INTRAMUSCULAR; INTRAVENOUS AS NEEDED
Status: DISCONTINUED | OUTPATIENT
Start: 2022-03-21 | End: 2022-03-21

## 2022-03-21 RX ORDER — PROPOFOL 10 MG/ML
INJECTION, EMULSION INTRAVENOUS CONTINUOUS PRN
Status: DISCONTINUED | OUTPATIENT
Start: 2022-03-21 | End: 2022-03-21

## 2022-03-21 RX ORDER — LIDOCAINE HYDROCHLORIDE 20 MG/ML
INJECTION, SOLUTION EPIDURAL; INFILTRATION; INTRACAUDAL; PERINEURAL AS NEEDED
Status: DISCONTINUED | OUTPATIENT
Start: 2022-03-21 | End: 2022-03-21

## 2022-03-21 RX ORDER — SODIUM CHLORIDE 9 MG/ML
INJECTION, SOLUTION INTRAVENOUS CONTINUOUS PRN
Status: DISCONTINUED | OUTPATIENT
Start: 2022-03-21 | End: 2022-03-21

## 2022-03-21 RX ORDER — PROPOFOL 10 MG/ML
INJECTION, EMULSION INTRAVENOUS AS NEEDED
Status: DISCONTINUED | OUTPATIENT
Start: 2022-03-21 | End: 2022-03-21

## 2022-03-21 RX ADMIN — LIDOCAINE HYDROCHLORIDE 60 MG: 20 INJECTION, SOLUTION EPIDURAL; INFILTRATION; INTRACAUDAL; PERINEURAL at 14:31

## 2022-03-21 RX ADMIN — PROPOFOL 30 MG: 10 INJECTION, EMULSION INTRAVENOUS at 14:36

## 2022-03-21 RX ADMIN — PROPOFOL 100 MG: 10 INJECTION, EMULSION INTRAVENOUS at 14:31

## 2022-03-21 RX ADMIN — PROPOFOL 100 MCG/KG/MIN: 10 INJECTION, EMULSION INTRAVENOUS at 14:31

## 2022-03-21 RX ADMIN — SODIUM CHLORIDE: 0.9 INJECTION, SOLUTION INTRAVENOUS at 14:02

## 2022-03-21 RX ADMIN — SODIUM CHLORIDE: 0.9 INJECTION, SOLUTION INTRAVENOUS at 14:25

## 2022-03-21 RX ADMIN — GLYCOPYRROLATE 0.1 MG: 0.2 INJECTION, SOLUTION INTRAMUSCULAR; INTRAVENOUS at 14:29

## 2022-03-21 NOTE — ANESTHESIA POSTPROCEDURE EVALUATION
Post-Op Assessment Note    CV Status:  Stable  Pain Score: 0    Pain management: adequate     Mental Status:  Alert and awake   Hydration Status:  Euvolemic   PONV Controlled:  Controlled   Airway Patency:  Patent      Post Op Vitals Reviewed: Yes      Staff: CRNA         No complications documented      /83 (03/21/22 1444)    Temp 98 2 °F (36 8 °C) (03/21/22 1444)    Pulse 94 (03/21/22 1444)   Resp 16 (03/21/22 1444)    SpO2 99 % (03/21/22 1444)

## 2022-03-21 NOTE — H&P
History and Physical - [facility]  Francisco Sherman 75 y o  female MRN: [id number]                  HPI: Francisco Sherman is a 75y o  year old female who presents for EGD for surveillance of ampullary adenoma  REVIEW OF SYSTEMS: Per the HPI, and otherwise unremarkable  Historical Information   Past Medical History:   Diagnosis Date    Colon polyp     Medical history reviewed with no changes     Osteoporosis     Thyroglossal cyst      Past Surgical History:   Procedure Laterality Date    BREAST BIOPSY Left 2018    benign    COLONOSCOPY      2017    CYSTOSCOPY  07/06/2021    Dr Virginia Runner History   Social History     Substance and Sexual Activity   Alcohol Use Yes    Alcohol/week: 2 0 standard drinks    Types: 2 Cans of beer per week    Comment: coors lite every day     Social History     Substance and Sexual Activity   Drug Use Not Currently     Social History     Tobacco Use   Smoking Status Former Smoker   Smokeless Tobacco Never Used   Tobacco Comment    quit 2004     Family History   Problem Relation Age of Onset    Dementia Mother     Diabetes Father     Breast cancer Cousin 39    Breast cancer Cousin 39    Colon cancer Neg Hx     Ovarian cancer Neg Hx     Uterine cancer Neg Hx     Cervical cancer Neg Hx        Meds/Allergies       Current Outpatient Medications:     BIOTIN PO    calcium carbonate (OS-CARTER) 600 MG tablet    Cholecalciferol (VITAMIN D3) 1000 units CAPS    COLLAGEN PO    estradiol (ESTRACE) 0 1 mg/g vaginal cream    folic acid (FOLVITE) 1 mg tablet    ofloxacin (OCUFLOX) 0 3 % ophthalmic solution    prednisoLONE acetate (PRED FORTE) 1 % ophthalmic suspension  No current facility-administered medications for this encounter      Facility-Administered Medications Ordered in Other Encounters:     sodium chloride 0 9 % infusion, , Intravenous, Continuous PRN, New Bag at 03/21/22 1402    No Known Allergies    Objective     /90   Pulse 75   Temp 97 6 °F (36 4 °C) (Tympanic)   Resp 16   Ht 5' 3" (1 6 m)   LMP  (LMP Unknown)   SpO2 100%   BMI 21 61 kg/m²       PHYSICAL EXAM    Gen: NAD  Head: NCAT  CV: RRR  CHEST: Clear  ABD: soft, NT/ND  EXT: no edema      ASSESSMENT/PLAN:  This is a 75y o  year old female here for EGD with side viewing scope, and she is stable and optimized for her procedure

## 2022-03-21 NOTE — ANESTHESIA PREPROCEDURE EVALUATION
Procedure:  EGD    Relevant Problems   CARDIO   (+) Pure hypercholesterolemia      MUSCULOSKELETAL   (+) Primary osteoarthritis             Anesthesia Plan  ASA Score- 2     Anesthesia Type- IV sedation with anesthesia with ASA Monitors  Additional Monitors:   Airway Plan:     Comment: IV sedation,  standard ASA monitors  Risks and benefits discussed with patient; patient consented and agrees to proceed  I saw and evaluated the patient  If seen with CRNA, we have discussed the anesthetic plan and I am in agreement that the plan is appropriate for the patient          Plan Factors-    Chart reviewed  Existing labs reviewed  Induction- intravenous  Postoperative Plan-     Informed Consent- Anesthetic plan and risks discussed with patient  I personally reviewed this patient with the CRNA  Discussed and agreed on the Anesthesia Plan with the CRNA  Wesley Lozano

## 2022-04-04 ENCOUNTER — TELEPHONE (OUTPATIENT)
Dept: GASTROENTEROLOGY | Facility: CLINIC | Age: 67
End: 2022-04-04

## 2022-04-04 NOTE — TELEPHONE ENCOUNTER
Scheduled date of EGD(as of today): 9/27/22  Physician performing EGD:Dr Rodriguez  Location of EGD: [facility]  Instructions reviewed with patient by: Daysi/denae  Clearances: N/A

## 2022-04-04 NOTE — TELEPHONE ENCOUNTER
----- Message from Shaheed Gaona MD sent at 4/1/2022  3:36 PM EDT -----  Needs EGD with side viewing scope in 6 months at [facility] with me  Orders placed    Thanks!

## 2022-04-14 NOTE — PROGRESS NOTES
Assessment/Plan:       Diagnoses and all orders for this visit:    Encounter for immunization    Other orders  -     ofloxacin (OCUFLOX) 0 3 % ophthalmic solution; INSTILL ONE DROP INTO THE LEFT EYE FOUR TIMES A DAY AS DIRECTED, START THREE DAYS PRIOR TO SURGERY IN OPERATIVE EYE  -     prednisoLONE acetate (PRED FORTE) 1 % ophthalmic suspension; INSTILL 1 DROP INTO LEFT EYE 4 TIMES DAILY                Subjective:      Patient ID: Rakel Matthews is a 75 y o  female  58-year-old female who looks well  Continues to work full-time  Lives with her son  Really has no symptoms of any kind  She has been told that she has osteoporosis but has no symptoms referable to it     Colonoscopy was done in 2020 by Dr Nicholas Singh; follows with him a  regular basis given prior history of colon polyps  There was a tubular adenoma of the ascending colon  Mammogram recently    Mother  from Alzheimer's brother  from COPD brother  from ETOH   Surgical history: Thyroglossal duct cyst surgery remotely  Rash should the patient have microscopic hematuria  This generated a CT renal protocol  There is no kidney and a bladder lesion, but she had a bile duct abnormality  This in turn led to repetitive endoscopies which have documented low-grade dysplasia  She continues to follow with GI for this issue  There are no symptoms referable to this  Cataract surgery scheduled there is no contraindication      The following portions of the patient's history were reviewed and updated as appropriate:   She has a past medical history of Colon polyp, Medical history reviewed with no changes, Osteoporosis, and Thyroglossal cyst ,  does not have any pertinent problems on file  ,   has a past surgical history that includes Colonoscopy; Throat surgery; Breast biopsy (Left, ); Implant; and Cystoscopy (2021)  ,  family history includes Breast cancer (age of onset: 39) in her cousin and cousin; Dementia in her mother; Diabetes in her father  ,   reports that she has quit smoking  She has never used smokeless tobacco  She reports current alcohol use of about 2 0 standard drinks of alcohol per week  She reports previous drug use ,  has No Known Allergies     Current Outpatient Medications   Medication Sig Dispense Refill    BIOTIN PO Take by mouth      calcium carbonate (OS-CARTER) 600 MG tablet Take 600 mg by mouth 2 (two) times a day with meals      Cholecalciferol (VITAMIN D3) 1000 units CAPS Take by mouth      COLLAGEN PO Take 5,000 mcg by mouth daily      estradiol (ESTRACE) 0 1 mg/g vaginal cream 0 5 gram intravaginally twice weekly 72 0 g 1    folic acid (FOLVITE) 1 mg tablet Take by mouth daily      ofloxacin (OCUFLOX) 0 3 % ophthalmic solution INSTILL ONE DROP INTO THE LEFT EYE FOUR TIMES A DAY AS DIRECTED, START THREE DAYS PRIOR TO SURGERY IN OPERATIVE EYE      prednisoLONE acetate (PRED FORTE) 1 % ophthalmic suspension INSTILL 1 DROP INTO LEFT EYE 4 TIMES DAILY       No current facility-administered medications for this visit  Review of Systems   Respiratory: Negative for cough, shortness of breath and wheezing  Cardiovascular: Negative for chest pain and leg swelling  Objective:  Vitals:    03/14/22 1716   BP: 120/68   Pulse: 79   Temp: 98 3 °F (36 8 °C)   SpO2: 99%      Physical Exam  Constitutional:       Appearance: Normal appearance  Cardiovascular:      Rate and Rhythm: Normal rate and regular rhythm  Pulmonary:      Effort: Pulmonary effort is normal       Breath sounds: Normal breath sounds  Neurological:      General: No focal deficit present  Mental Status: She is alert     Psychiatric:         Mood and Affect: Mood normal            Patient Instructions   ASA category 3 patient   Cataract surgery may proceed

## 2022-08-18 ENCOUNTER — OFFICE VISIT (OUTPATIENT)
Dept: DERMATOLOGY | Facility: CLINIC | Age: 67
End: 2022-08-18
Payer: COMMERCIAL

## 2022-08-18 VITALS — BODY MASS INDEX: 21.62 KG/M2 | WEIGHT: 122 LBS | HEIGHT: 63 IN

## 2022-08-18 DIAGNOSIS — L72.9 FOLLICULAR CYST OF SKIN: ICD-10-CM

## 2022-08-18 DIAGNOSIS — L82.1 SEBORRHEIC KERATOSIS: ICD-10-CM

## 2022-08-18 DIAGNOSIS — L72.0 MILIA: Primary | ICD-10-CM

## 2022-08-18 DIAGNOSIS — Z13.89 SCREENING FOR SKIN CONDITION: ICD-10-CM

## 2022-08-18 DIAGNOSIS — L23.9 ALLERGIC CONTACT DERMATITIS, UNSPECIFIED TRIGGER: ICD-10-CM

## 2022-08-18 PROCEDURE — 99213 OFFICE O/P EST LOW 20 MIN: CPT | Performed by: DERMATOLOGY

## 2022-08-18 PROCEDURE — 1160F RVW MEDS BY RX/DR IN RCRD: CPT | Performed by: DERMATOLOGY

## 2022-08-18 NOTE — PROGRESS NOTES
[facility] DERMATOLOGY  [address] [place] [id number]  [phone]  [phone]     MRN: [id number] : 1955  Encounter: [id number]  Patient Information: Rick Carr  Chief complaint: lesions in axilla and overall checkup    History of present illness:  71-year-old female presents for overall skin check concerned regarding potential skin cancer concerned regarding several lesions she noted in her axilla as well as small spots noted on the face area also a rash that has occurred for last couple weeks on her right inframammary area which has been minimally itching  Past Medical History:   Diagnosis Date    Colon polyp     Medical history reviewed with no changes     Osteoporosis     Thyroglossal cyst      Past Surgical History:   Procedure Laterality Date    BREAST BIOPSY Left 2018    benign    COLONOSCOPY      2017    CYSTOSCOPY  2021    Dr Eren Pena History   Social History     Substance and Sexual Activity   Alcohol Use Yes    Alcohol/week: 2 0 standard drinks    Types: 2 Cans of beer per week    Comment: coors lite every day     Social History     Substance and Sexual Activity   Drug Use Not Currently     Social History     Tobacco Use   Smoking Status Former Smoker   Smokeless Tobacco Never Used   Tobacco Comment    quit 2004     Family History   Problem Relation Age of Onset    Dementia Mother     Diabetes Father     Breast cancer Cousin 39    Breast cancer Cousin 39    Colon cancer Neg Hx     Ovarian cancer Neg Hx     Uterine cancer Neg Hx     Cervical cancer Neg Hx      Meds/Allergies   No Known Allergies    Meds:  Prior to Admission medications    Medication Sig Start Date End Date Taking?  Authorizing Provider   BIOTIN PO Take by mouth   Yes Historical Provider, MD   calcium carbonate (OS-CARTER) 600 MG tablet Take 600 mg by mouth 2 (two) times a day with meals   Yes Historical Provider, MD   Cholecalciferol (VITAMIN D3) 1000 units CAPS Take by mouth   Yes Historical Provider, MD   COLLAGEN PO Take 5,000 mcg by mouth daily   Yes Historical Provider, MD   estradiol (ESTRACE) 0 1 mg/g vaginal cream 0 5 gram intravaginally twice weekly 10/26/21  Yes CHLOÉ Hilario   folic acid (FOLVITE) 1 mg tablet Take by mouth daily   Yes Historical Provider, MD   ofloxacin (OCUFLOX) 0 3 % ophthalmic solution INSTILL ONE DROP INTO THE LEFT EYE FOUR TIMES A DAY AS DIRECTED, START THREE DAYS PRIOR TO SURGERY IN OPERATIVE EYE 2/21/22  Yes Historical Provider, MD   prednisoLONE acetate (PRED FORTE) 1 % ophthalmic suspension INSTILL 1 DROP INTO LEFT EYE 4 TIMES DAILY 2/21/22  Yes Historical Provider, MD       Subjective:     Review of Systems:    General: negative for - chills, fatigue, fever,  weight gain or weight loss  Psychological: negative for - anxiety, behavioral disorder, concentration difficulties, decreased libido, depression, irritability, memory difficulties, mood swings, sleep disturbances or suicidal ideation  ENT: negative for - hearing difficulties , nasal congestion, nasal discharge, oral lesions, sinus pain, sneezing, sore throat  Allergy and Immunology: negative for - hives, insect bite sensitivity,  Hematological and Lymphatic: negative for - bleeding problems, blood clots,bruising, swollen lymph nodes  Endocrine: negative for - hair pattern changes, hot flashes, malaise/lethargy, mood swings, palpitations, polydipsia/polyuria, skin changes, temperature intolerance or unexpected weight change  Respiratory: negative for - cough, hemoptysis, orthopnea, shortness of breath, or wheezing  Cardiovascular: negative for - chest pain, dyspnea on exertion, edema,  Gastrointestinal: negative for - abdominal pain, nausea/vomiting  Genito-Urinary: negative for - dysuria, incontinence, irregular/heavy menses or urinary frequency/urgency  Musculoskeletal: negative for - gait disturbance, joint pain, joint stiffness, joint swelling, muscle pain, muscular weakness  Dermatological:  As in HPI  Neurological: negative for confusion, dizziness, headaches, impaired coordination/balance, memory loss, numbness/tingling, seizures, speech problems, tremors or weakness       Objective:   Ht 5' 3" (1 6 m)   Wt 55 3 kg (122 lb)   LMP  (LMP Unknown)   BMI 21 61 kg/m²     Physical Exam:    General Appearance:    Alert, cooperative, no distress   Head:    Normocephalic, without obvious abnormality, atraumatic           Skin:   A full skin exam was performed including scalp, head scalp, eyes, ears, nose, lips, neck, chest, axilla, abdomen, back, buttocks, bilateral upper extremities, bilateral lower extremities, hands, feet, fingers, toes, fingernails, and toenails small 1-2 mm white papules noted on the face some larger 2-3 mm cystic lesions noted in the axilla smaller normal keratotic papules with greasy stuck on appearance scaling erythematous well-demarcated patch noted on the right inframammary area numb nothing else atypical noted on exam     Assessment:     1  Milia     2  Follicular cyst of skin     3  Seborrheic keratosis     4  Screening for skin condition     5  Allergic contact dermatitis, unspecified trigger           Plan:   Milia explained that these are small cyst no intervention necessary  Follicular cyst advised patient that this is from hair follicles that ballooned out and forms this type of growth  No treatment indicated unless patient so desires or if lesion enlarges or changes  Seborrheic Keratosis  Patient reasurred these are normal growths we acquire with age no treatment needed    Rash appears to be consistent with a contact reaction should resolve over the next week or so does not patient to let us know  Screening for Dermatologic Disorders: Nothing else of concern noted on complete exam follow up in 1 year       Irina Raymond MD  8/18/2022,11:32 AM    Portions of the record may have been created with voice recognition software   Occasional wrong word or "sound a like" substitutions may have occurred due to the inherent limitations of voice recognition software   Read the chart carefully and recognize, using context, where substitutions have occurred

## 2022-08-18 NOTE — PATIENT INSTRUCTIONS
Elizabeth explained that these are small cyst no intervention necessary  Follicular cyst advised patient that this is from hair follicles that ballooned out and forms this type of growth  No treatment indicated unless patient so desires or if lesion enlarges or changes  Seborrheic Keratosis  Patient reasurred these are normal growths we acquire with age no treatment needed    Rash appears to be consistent with a contact reaction should resolve over the next week or so does not patient to let us know  Screening for Dermatologic Disorders: Nothing else of concern noted on complete exam follow up in 1 year

## 2022-09-27 ENCOUNTER — HOSPITAL ENCOUNTER (OUTPATIENT)
Dept: GASTROENTEROLOGY | Facility: HOSPITAL | Age: 67
Setting detail: OUTPATIENT SURGERY
Discharge: HOME | End: 2022-09-27
Attending: INTERNAL MEDICINE
Payer: COMMERCIAL

## 2022-09-27 ENCOUNTER — ANESTHESIA EVENT (OUTPATIENT)
Dept: GASTROENTEROLOGY | Facility: HOSPITAL | Age: 67
End: 2022-09-27

## 2022-09-27 ENCOUNTER — ANESTHESIA (OUTPATIENT)
Dept: GASTROENTEROLOGY | Facility: HOSPITAL | Age: 67
End: 2022-09-27

## 2022-09-27 VITALS
HEIGHT: 63 IN | TEMPERATURE: 96.7 F | DIASTOLIC BLOOD PRESSURE: 80 MMHG | OXYGEN SATURATION: 98 % | RESPIRATION RATE: 16 BRPM | SYSTOLIC BLOOD PRESSURE: 134 MMHG | WEIGHT: 122 LBS | HEART RATE: 89 BPM | BODY MASS INDEX: 21.62 KG/M2

## 2022-09-27 DIAGNOSIS — D13.5 AMPULLARY ADENOMA: ICD-10-CM

## 2022-09-27 PROCEDURE — C1769 GUIDE WIRE: HCPCS

## 2022-09-27 PROCEDURE — 88305 TISSUE EXAM BY PATHOLOGIST: CPT | Performed by: PATHOLOGY

## 2022-09-27 PROCEDURE — 43239 EGD BIOPSY SINGLE/MULTIPLE: CPT | Performed by: INTERNAL MEDICINE

## 2022-09-27 RX ORDER — PROPOFOL 10 MG/ML
INJECTION, EMULSION INTRAVENOUS AS NEEDED
Status: DISCONTINUED | OUTPATIENT
Start: 2022-09-27 | End: 2022-09-27

## 2022-09-27 RX ORDER — LIDOCAINE HYDROCHLORIDE 10 MG/ML
INJECTION, SOLUTION EPIDURAL; INFILTRATION; INTRACAUDAL; PERINEURAL AS NEEDED
Status: DISCONTINUED | OUTPATIENT
Start: 2022-09-27 | End: 2022-09-27

## 2022-09-27 RX ORDER — SODIUM CHLORIDE 9 MG/ML
INJECTION, SOLUTION INTRAVENOUS CONTINUOUS PRN
Status: DISCONTINUED | OUTPATIENT
Start: 2022-09-27 | End: 2022-09-27

## 2022-09-27 RX ADMIN — PROPOFOL 30 MG: 10 INJECTION, EMULSION INTRAVENOUS at 14:31

## 2022-09-27 RX ADMIN — LIDOCAINE HYDROCHLORIDE 60 MG: 10 INJECTION, SOLUTION EPIDURAL; INFILTRATION; INTRACAUDAL; PERINEURAL at 14:18

## 2022-09-27 RX ADMIN — PROPOFOL 50 MG: 10 INJECTION, EMULSION INTRAVENOUS at 14:22

## 2022-09-27 RX ADMIN — SODIUM CHLORIDE: 0.9 INJECTION, SOLUTION INTRAVENOUS at 14:31

## 2022-09-27 RX ADMIN — PROPOFOL 20 MG: 10 INJECTION, EMULSION INTRAVENOUS at 14:32

## 2022-09-27 RX ADMIN — PROPOFOL 30 MG: 10 INJECTION, EMULSION INTRAVENOUS at 14:26

## 2022-09-27 RX ADMIN — PROPOFOL 50 MG: 10 INJECTION, EMULSION INTRAVENOUS at 14:20

## 2022-09-27 RX ADMIN — SODIUM CHLORIDE: 0.9 INJECTION, SOLUTION INTRAVENOUS at 14:11

## 2022-09-27 RX ADMIN — PROPOFOL 100 MG: 10 INJECTION, EMULSION INTRAVENOUS at 14:18

## 2022-09-27 NOTE — ANESTHESIA POSTPROCEDURE EVALUATION
Post-Op Assessment Note    CV Status:  Stable    Pain management: adequate     Mental Status:  Awake   Hydration Status:  Euvolemic   PONV Controlled:  Controlled   Airway Patency:  Patent      Post Op Vitals Reviewed: Yes      Staff: Anesthesiologist, CRNA         No complications documented      /71 (09/27/22 1440)    Temp (!) 96 7 °F (35 9 °C) (09/27/22 1440)    Pulse 90 (09/27/22 1440)   Resp 16 (09/27/22 1440)    SpO2 99 % (09/27/22 1440)

## 2022-09-27 NOTE — ANESTHESIA PREPROCEDURE EVALUATION
Procedure:  EGD    Relevant Problems   CARDIO   (+) Pure hypercholesterolemia      MUSCULOSKELETAL   (+) Primary osteoarthritis        Physical Exam    Airway    Mallampati score: II  TM Distance: >3 FB  Neck ROM: full     Dental       Cardiovascular  Cardiovascular exam normal    Pulmonary  Pulmonary exam normal     Other Findings        Anesthesia Plan  ASA Score- 2     Anesthesia Type- IV sedation with anesthesia with ASA Monitors  Additional Monitors:   Airway Plan:           Plan Factors-Exercise tolerance (METS): >4 METS  Chart reviewed  EKG reviewed  Imaging results reviewed  Existing labs reviewed  Patient summary reviewed  Induction- intravenous  Postoperative Plan- Plan for postoperative opioid use  Planned trial extubation    Informed Consent- Anesthetic plan and risks discussed with patient  I personally reviewed this patient with the CRNA  Discussed and agreed on the Anesthesia Plan with the CRNA  Colleen Bray

## 2022-10-05 PROCEDURE — 88305 TISSUE EXAM BY PATHOLOGIST: CPT | Performed by: PATHOLOGY

## 2022-10-11 ENCOUNTER — TELEPHONE (OUTPATIENT)
Dept: OTHER | Facility: OTHER | Age: 67
End: 2022-10-11

## 2022-10-11 NOTE — TELEPHONE ENCOUNTER
Provided patient with tissue results per Dr Rebeca Curtis; verbalized understanding  Patient wanted to schedule follow up EGD

## 2022-10-13 ENCOUNTER — HOSPITAL ENCOUNTER (OUTPATIENT)
Dept: MAMMOGRAPHY | Facility: CLINIC | Age: 67
Discharge: HOME | End: 2022-10-13
Payer: COMMERCIAL

## 2022-10-13 DIAGNOSIS — Z12.31 ENCOUNTER FOR SCREENING MAMMOGRAM FOR MALIGNANT NEOPLASM OF BREAST: ICD-10-CM

## 2022-10-13 PROCEDURE — 77063 BREAST TOMOSYNTHESIS BI: CPT

## 2022-10-13 PROCEDURE — 77067 SCR MAMMO BI INCL CAD: CPT

## 2022-10-18 ENCOUNTER — HOSPITAL ENCOUNTER (OUTPATIENT)
Dept: BONE DENSITY | Facility: CLINIC | Age: 67
Discharge: HOME | End: 2022-10-18
Payer: COMMERCIAL

## 2022-10-18 DIAGNOSIS — Z78.0 ASYMPTOMATIC POSTMENOPAUSAL STATUS: ICD-10-CM

## 2022-10-18 PROCEDURE — 77080 DXA BONE DENSITY AXIAL: CPT

## 2022-10-21 ENCOUNTER — TELEPHONE (OUTPATIENT)
Dept: OBGYN CLINIC | Facility: CLINIC | Age: 67
End: 2022-10-21

## 2022-10-21 NOTE — TELEPHONE ENCOUNTER
----- Message from Kristie Borden, [address]  sent at 10/20/2022  5:09 PM EDT -----  Please notify patient her Dexa scan results showed MODERATE OSTEOPENIA  We can start medication for this if she agrees  She should also continue to work on calcium/vitamin D in her diet and weight bearing exercises  Thanks

## 2022-10-21 NOTE — TELEPHONE ENCOUNTER
Pt informed re dexa result  States she does not want any medication, but will continue with calcium/vit D, and exercise

## 2022-11-01 ENCOUNTER — OFFICE VISIT (OUTPATIENT)
Dept: OBGYN CLINIC | Age: 67
End: 2022-11-01

## 2022-11-01 VITALS
WEIGHT: 12 LBS | BODY MASS INDEX: 2.12 KG/M2 | HEIGHT: 63 IN | DIASTOLIC BLOOD PRESSURE: 70 MMHG | SYSTOLIC BLOOD PRESSURE: 106 MMHG

## 2022-11-01 DIAGNOSIS — Z01.419 ENCOUNTER FOR GYNECOLOGICAL EXAMINATION WITHOUT ABNORMAL FINDING: Primary | ICD-10-CM

## 2022-11-01 DIAGNOSIS — Z12.31 SCREENING MAMMOGRAM FOR BREAST CANCER: ICD-10-CM

## 2022-11-01 DIAGNOSIS — M85.80 MODERATE OSTEOPENIA: ICD-10-CM

## 2022-11-01 DIAGNOSIS — Z78.0 ASYMPTOMATIC POSTMENOPAUSAL STATUS: ICD-10-CM

## 2022-11-01 PROBLEM — H91.93 BILATERAL HEARING LOSS: Status: RESOLVED | Noted: 2017-11-15 | Resolved: 2022-11-01

## 2022-11-01 PROBLEM — M54.2 NECK PAIN: Status: RESOLVED | Noted: 2019-03-11 | Resolved: 2022-11-01

## 2022-11-01 NOTE — PROGRESS NOTES
Patient presents for: yearly    Menarche-  13  Last Pap Smear- 10/26/2021  Hx of abnormal paps-never  Birth control-    Mammogram- 10/13/2022  DXA-10/18/22  Colonoscopy-07/13/20    Smoking status- former  Last sexual activity-yes  Family history of uterine, ovarian, cervical or breast cancer-  Two  Maternal cousins breast cancer   Concerns- none

## 2022-11-01 NOTE — PATIENT INSTRUCTIONS
Breast Self Exam for Women   AMBULATORY CARE:   A breast self-exam (BSE)  is a way to check your breasts for lumps and other changes  Regular BSEs can help you know how your breasts normally look and feel  Most breast lumps or changes are not cancer, but you should always have them checked by a healthcare provider  Why you should do a BSE:  Breast cancer is the most common type of cancer in women  Even if you have mammograms, you may still want to do a BSE regularly  If you know how your breasts normally feel and look, it may help you know when to contact your healthcare provider  Mammograms can miss some cancers  You may find a lump during a BSE that did not show up on a mammogram   When you should do a BSE:  If you have periods, you may want to do your BSE 1 week after your period ends  This is the time when your breasts may be the least swollen, lumpy, or tender  You can do regular BSEs even if you are breastfeeding or have breast implants  Call your doctor if:   You find any lumps or changes in your breasts  You have breast pain or fluid coming from your nipples  You have questions or concerns about your condition or care  How to do a BSE:       Look at your breasts in a mirror  Look at the size and shape of each breast and nipple  Check for swelling, lumps, dimpling, scaly skin, or other skin changes  Look for nipple changes, such as a nipple that is painful or beginning to pull inward  Gently squeeze both nipples and check to see if fluid (that is not breast milk) comes out of them  If you find any of these or other breast changes, contact your healthcare provider  Check your breasts while you sit or  the following 3 positions:    [place] your arms down at your sides  Raise your hands and join them behind your head  Put firm pressure with your hands on your hips  Bend slightly forward while you look at your breasts in the mirror  Lie down and feel your breasts    When you lie down, your breast tissue spreads out evenly over your chest  This makes it easier for you to feel for lumps and anything that may not be normal for your breasts  Do a BSE on one breast at a time  Place a small pillow or towel under your left shoulder  Put your left arm behind your head  Use the 3 middle fingers of your right hand  Use your fingertip pads, on the top of your fingers  Your fingertip pad is the most sensitive part of your finger  Use small circles to feel your breast tissue  Use your fingertip pads to make dime-sized, overlapping circles on your breast and armpits  Use light, medium, and firm pressure  First, press lightly  Second, press with medium pressure to feel a little deeper into the breast  Last, use firm pressure to feel deep within your breast     Examine your entire breast area  Examine the breast area from above the breast to below the breast where you feel only ribs  Make small circles with your fingertips, starting in the middle of your armpit  Make circles going up and down the breast area  Continue toward your breast and all the way across it  Examine the area from your armpit all the way over to the middle of your chest (breastbone)  Stop at the middle of your chest     Move the pillow or towel to your right shoulder, and put your right arm behind your head  Use the 3 fingertip pads of your left hand, and repeat the above steps to do a BSE on your right breast     What else you can do to check for breast problems or cancer:  Talk to your healthcare provider about mammograms  A mammogram is an x-ray of your breasts to screen for breast cancer or other problems  Your provider can tell you the benefits and risks of mammograms  The first mammogram is usually at age 39 or 48  Your provider may recommend you start at 36 or younger if your risk for breast cancer is high  Mammograms usually continue every 1 to 2 years until age 76         Follow up with your doctor as directed:  Write down your questions so you remember to ask them during your visits  © Copyright [employer] 2022 Information is for End User's use only and may not be sold, redistributed or otherwise used for commercial purposes  All illustrations and images included in CareNotes® are the copyrighted property of A D A M , Inc  or Carol Pedroza  The above information is an  only  It is not intended as medical advice for individual conditions or treatments  Talk to your doctor, nurse or pharmacist before following any medical regimen to see if it is safe and effective for you

## 2022-11-01 NOTE — PROGRESS NOTES
Diagnoses and all orders for this visit:    Problem List Items Addressed This Visit        Other    Asymptomatic postmenopausal status      Other Visit Diagnoses     Encounter for gynecological examination without abnormal finding    -  Primary    Moderate osteopenia        Screening mammogram for breast cancer        Relevant Orders    Mammo screening bilateral w 3d & cad        Call as needed, encouraged calcium/vit D in her diet, call with any PMB, all questions answered  Pleasant 75 y o  postmenopausal female here for annual exam  She denies postmenopausal bleeding  Denies history of abnormal pap smears, last Pap NIL 2019 neg, no further paps indicated  Denies vaginal issues  Denies pelvic pain  Denies postmenopausal issues  Not sexually active in 2 months, has some dryness but not bothersome  She tried vaginal estrogen and did not like it  Colonoscopy done 2020,due in 5 yrs  DXA due, last one was done 10/2022 and showed moderate ostepenia, meds recommended but she declines   Mammogram 10/13/2022 -needs additional views      Past Medical History:   Diagnosis Date   • Colon polyp    • Medical history reviewed with no changes    • Osteoporosis    • Thyroglossal cyst      Past Surgical History:   Procedure Laterality Date   • BREAST BIOPSY Left 2018    benign   • CATARACT EXTRACTION     • COLONOSCOPY      2017   • CYSTOSCOPY  07/06/2021    Dr Ankit Cabrera    • DENTAL IMPLANT     • EGD  09/27/2022   • THROAT SURGERY       Family History   Problem Relation Age of Onset   • Dementia Mother    • Diabetes Father    • Breast cancer Cousin 39   • Breast cancer Cousin 39   • No Known Problems Maternal Aunt    • No Known Problems Maternal Aunt    • No Known Problems Maternal Aunt    • Colon cancer Neg Hx    • Ovarian cancer Neg Hx    • Uterine cancer Neg Hx    • Cervical cancer Neg Hx      Social History     Tobacco Use   • Smoking status: Former Smoker     Packs/day: 0 25     Years: 33 00     Pack years: 8 25 Types: Cigarettes     Start date: 3/17/1971     Quit date: 3/17/2004     Years since quittin 6   • Smokeless tobacco: Never Used   • Tobacco comment: quit    Vaping Use   • Vaping Use: Never used   Substance Use Topics   • Alcohol use: Yes     Alcohol/week: 14 0 standard drinks     Types: 14 Cans of beer per week     Comment: 2 per day  I filled this info out for Dr Lefty Landry! • Drug use: Never       Current Outpatient Medications:   •  BIOTIN PO, Take by mouth, Disp: , Rfl:   •  calcium carbonate (OS-CARTER) 600 MG tablet, Take 600 mg by mouth 2 (two) times a day with meals, Disp: , Rfl:   •  Cholecalciferol (VITAMIN D3) 1000 units CAPS, Take by mouth, Disp: , Rfl:   •  COLLAGEN PO, Take 5,000 mcg by mouth daily, Disp: , Rfl:   •  folic acid (FOLVITE) 1 mg tablet, Take by mouth daily, Disp: , Rfl:   Patient Active Problem List    Diagnosis Date Noted   • Asymptomatic postmenopausal status 10/26/2021   • Atrophic vaginitis 10/26/2021   • Bile duct abnormality 2021   • Other microscopic hematuria 2021   • Tubular adenoma of colon 2020   • Colon polyp 2020   • Pure hypercholesterolemia 2019   • Elevated liver function tests 2019   • Osteoporosis without current pathological fracture 2017   • Primary osteoarthritis 2017   • Seborrheic keratosis 2014       No Known Allergies    OB History    Para Term  AB Living   1 1 1     1   SAB IAB Ectopic Multiple Live Births           1      # Outcome Date GA Lbr Stuart/2nd Weight Sex Delivery Anes PTL Lv   1 Term              Worked at [employer]  Son is 28 yrs old, no grands    Vitals:    22 1344   BP: 106/70   BP Location: Left arm   Patient Position: Sitting   Cuff Size: Standard   Weight: 5 443 kg (12 lb)   Height: 5' 3" (1 6 m)     Body mass index is 2 13 kg/m²  Review of Systems   Constitutional: Negative for chills, fatigue, fever and unexpected weight change     Respiratory: Negative for shortness of breath  Gastrointestinal: Negative for anal bleeding, blood in stool, constipation and diarrhea  Genitourinary: Negative for difficulty urinating, dysuria and hematuria  Physical Exam   Constitutional: She appears well-developed and well-nourished  No distress  HENT: atraumatic, EOMI  Head: Normocephalic  Neck: Normal range of motion  Neck supple  Pulmonary: Effort normal   Breasts: bilateral without masses, skin changes or nipple discharge  Bilaterally soft and warm to touch  No areas of erythema or pain  Abdominal: Soft  Pelvic exam was performed with patient supine  No labial fusion  There is no rash, tenderness, lesion or injury on the right labia  There is no rash, tenderness, lesion or injury on the left labia  Urethral meatus does not show any tenderness, inflammation or discharge  Palpation of midline bladder without pain or discomfort  Uterus is not deviated, not enlarged, not fixed and not tender  Cervix exhibits no motion tenderness, no discharge  Atrophy and Cammal  Right adnexum displays no mass, no tenderness and no fullness  Left adnexum displays no mass, no tenderness and no fullness  No erythema or tenderness in the vagina  No foreign body in the vagina  No signs of injury around the vagina or anus  Perineum without lesions, signs of injury, erythema or swelling  No vaginal discharge found  MODERATE VAGINAL ATROPHY, tolerated white spec and better tolerated 1 digit exam   Lymphadenopathy:        Right: No inguinal adenopathy present  Left: No inguinal adenopathy present

## 2022-11-16 ENCOUNTER — HOSPITAL ENCOUNTER (OUTPATIENT)
Dept: MAMMOGRAPHY | Facility: CLINIC | Age: 67
Discharge: HOME | End: 2022-11-16

## 2022-11-16 ENCOUNTER — HOSPITAL ENCOUNTER (OUTPATIENT)
Dept: ULTRASOUND IMAGING | Facility: CLINIC | Age: 67
Discharge: HOME | End: 2022-11-16

## 2022-11-16 VITALS — BODY MASS INDEX: 2.13 KG/M2 | HEIGHT: 63 IN

## 2022-11-16 DIAGNOSIS — R92.8 ABNORMAL MAMMOGRAM: ICD-10-CM

## 2022-12-21 ENCOUNTER — RA CDI HCC (OUTPATIENT)
Dept: OTHER | Facility: HOSPITAL | Age: 67
End: 2022-12-21

## 2022-12-21 NOTE — PROGRESS NOTES
[facility] 75  coding opportunities       Chart reviewed, no opportunity found:   [address]        Patients Insurance     Medicare Insurance: [employer]

## 2022-12-23 ENCOUNTER — OFFICE VISIT (OUTPATIENT)
Dept: INTERNAL MEDICINE CLINIC | Facility: CLINIC | Age: 67
End: 2022-12-23

## 2022-12-23 ENCOUNTER — APPOINTMENT (OUTPATIENT)
Dept: LAB | Facility: CLINIC | Age: 67
End: 2022-12-23

## 2022-12-23 VITALS
RESPIRATION RATE: 18 BRPM | OXYGEN SATURATION: 98 % | TEMPERATURE: 97.3 F | DIASTOLIC BLOOD PRESSURE: 74 MMHG | HEIGHT: 63 IN | HEART RATE: 91 BPM | BODY MASS INDEX: 2.13 KG/M2 | SYSTOLIC BLOOD PRESSURE: 116 MMHG

## 2022-12-23 DIAGNOSIS — E78.00 PURE HYPERCHOLESTEROLEMIA: ICD-10-CM

## 2022-12-23 DIAGNOSIS — E78.00 PURE HYPERCHOLESTEROLEMIA: Primary | ICD-10-CM

## 2022-12-23 LAB
ALBUMIN SERPL BCP-MCNC: 3.9 G/DL (ref 3.5–5)
ALP SERPL-CCNC: 42 U/L (ref 46–116)
ALT SERPL W P-5'-P-CCNC: 26 U/L (ref 12–78)
ANION GAP SERPL CALCULATED.3IONS-SCNC: 5 MMOL/L (ref 4–13)
AST SERPL W P-5'-P-CCNC: 24 U/L (ref 5–45)
BACTERIA UR QL AUTO: ABNORMAL /HPF
BASOPHILS # BLD AUTO: 0.07 THOUSANDS/ÂΜL (ref 0–0.1)
BASOPHILS NFR BLD AUTO: 1 % (ref 0–1)
BILIRUB SERPL-MCNC: 0.81 MG/DL (ref 0.2–1)
BILIRUB UR QL STRIP: NEGATIVE
BUN SERPL-MCNC: 22 MG/DL (ref 5–25)
CALCIUM SERPL-MCNC: 9.8 MG/DL (ref 8.3–10.1)
CHLORIDE SERPL-SCNC: 104 MMOL/L (ref 96–108)
CHOLEST SERPL-MCNC: 238 MG/DL
CLARITY UR: CLEAR
CO2 SERPL-SCNC: 28 MMOL/L (ref 21–32)
COLOR UR: YELLOW
CREAT SERPL-MCNC: 0.86 MG/DL (ref 0.6–1.3)
EOSINOPHIL # BLD AUTO: 0.24 THOUSAND/ÂΜL (ref 0–0.61)
EOSINOPHIL NFR BLD AUTO: 3 % (ref 0–6)
ERYTHROCYTE [DISTWIDTH] IN BLOOD BY AUTOMATED COUNT: 13.4 % (ref 11.6–15.1)
GFR SERPL CREATININE-BSD FRML MDRD: 69 ML/MIN/1.73SQ M
GLUCOSE P FAST SERPL-MCNC: 95 MG/DL (ref 65–99)
GLUCOSE UR STRIP-MCNC: NEGATIVE MG/DL
HCT VFR BLD AUTO: 43.8 % (ref 34.8–46.1)
HDLC SERPL-MCNC: 82 MG/DL
HGB BLD-MCNC: 13.7 G/DL (ref 11.5–15.4)
HGB UR QL STRIP.AUTO: ABNORMAL
IMM GRANULOCYTES # BLD AUTO: 0.03 THOUSAND/UL (ref 0–0.2)
IMM GRANULOCYTES NFR BLD AUTO: 0 % (ref 0–2)
KETONES UR STRIP-MCNC: NEGATIVE MG/DL
LDLC SERPL CALC-MCNC: 137 MG/DL (ref 0–100)
LEUKOCYTE ESTERASE UR QL STRIP: ABNORMAL
LYMPHOCYTES # BLD AUTO: 1.8 THOUSANDS/ÂΜL (ref 0.6–4.47)
LYMPHOCYTES NFR BLD AUTO: 25 % (ref 14–44)
MCH RBC QN AUTO: 28.7 PG (ref 26.8–34.3)
MCHC RBC AUTO-ENTMCNC: 31.3 G/DL (ref 31.4–37.4)
MCV RBC AUTO: 92 FL (ref 82–98)
MONOCYTES # BLD AUTO: 0.6 THOUSAND/ÂΜL (ref 0.17–1.22)
MONOCYTES NFR BLD AUTO: 8 % (ref 4–12)
MUCOUS THREADS UR QL AUTO: ABNORMAL
NEUTROPHILS # BLD AUTO: 4.4 THOUSANDS/ÂΜL (ref 1.85–7.62)
NEUTS SEG NFR BLD AUTO: 63 % (ref 43–75)
NITRITE UR QL STRIP: NEGATIVE
NON-SQ EPI CELLS URNS QL MICRO: ABNORMAL /HPF
NRBC BLD AUTO-RTO: 0 /100 WBCS
PH UR STRIP.AUTO: 7 [PH]
PLATELET # BLD AUTO: 293 THOUSANDS/UL (ref 149–390)
PMV BLD AUTO: 9.9 FL (ref 8.9–12.7)
POTASSIUM SERPL-SCNC: 3.8 MMOL/L (ref 3.5–5.3)
PROT SERPL-MCNC: 7.6 G/DL (ref 6.4–8.4)
PROT UR STRIP-MCNC: ABNORMAL MG/DL
RBC # BLD AUTO: 4.78 MILLION/UL (ref 3.81–5.12)
RBC #/AREA URNS AUTO: ABNORMAL /HPF
SODIUM SERPL-SCNC: 137 MMOL/L (ref 135–147)
SP GR UR STRIP.AUTO: 1.02 (ref 1–1.03)
TRIGL SERPL-MCNC: 93 MG/DL
TSH SERPL DL<=0.05 MIU/L-ACNC: 2.36 UIU/ML (ref 0.45–4.5)
UROBILINOGEN UR STRIP-ACNC: <2 MG/DL
WBC # BLD AUTO: 7.14 THOUSAND/UL (ref 4.31–10.16)
WBC #/AREA URNS AUTO: ABNORMAL /HPF

## 2022-12-23 NOTE — PROGRESS NOTES
Assessment/Plan:       Diagnoses and all orders for this visit:    Pure hypercholesterolemia  -     Lipid Panel with Direct LDL reflex; Future  -     CBC and differential; Future  -     Comprehensive metabolic panel; Future  -     TSH, 3rd generation with Free T4 reflex; Future  -     UA (URINE) with reflex to Scope                Subjective:      Patient ID: Donnell Ly is a 75 y o  female  60-year-old female who looks well  Continues to work full-time  Lives with her son  Really has no symptoms of any kind  She has been told that she has osteoporosis but has no symptoms referable to it     Colonoscopy was done in 2020 by Dr Cliff Zhao; follows with him a  regular basis given prior history of colon polyps  There was a tubular adenoma of the ascending colon  Mammogram recently    Mother  from Alzheimer's brother  from COPD brother  from ETOH   Surgical history: Thyroglossal duct cyst surgery remotely  Several years ago laboratory testing documented  microscopic hematuria  This generated a CT renal protocol  There is no kidney and a bladder lesion, but she had a bile duct abnormality  This in turn led to repetitive endoscopies which have documented low-grade dysplasia  She continues to follow with GI for this issue  There are no symptoms referable to this  Her only complaint today is that she has gained about 15 pounds and now has some abdominal fat  However, there were no GI symptoms, and on exam she has some symmetrical adiposity so that scoliosis  The following portions of the patient's history were reviewed and updated as appropriate:   She has a past medical history of Colon polyp, Medical history reviewed with no changes, Osteoporosis, and Thyroglossal cyst ,  does not have any pertinent problems on file  ,   has a past surgical history that includes Colonoscopy; Throat surgery; Breast biopsy (Left, 2018);  Implant; Cystoscopy (2021); Cataract extraction; and EGD (09/27/2022)  ,  family history includes Breast cancer (age of onset: 39) in her cousin and cousin; Dementia in her mother; Diabetes in her father; No Known Problems in her maternal aunt, maternal aunt, and maternal aunt  ,   reports that she quit smoking about 18 years ago  Her smoking use included cigarettes  She started smoking about 51 years ago  She has a 8 25 pack-year smoking history  She has never used smokeless tobacco  She reports current alcohol use of about 14 0 standard drinks per week  She reports that she does not use drugs  ,  has No Known Allergies     Current Outpatient Medications   Medication Sig Dispense Refill   • calcium carbonate (OS-CARTER) 600 MG tablet Take 600 mg by mouth 2 (two) times a day with meals     • Cholecalciferol (VITAMIN D3) 1000 units CAPS Take by mouth     • COLLAGEN PO Take 5,000 mcg by mouth daily     • folic acid (FOLVITE) 1 mg tablet Take by mouth daily       No current facility-administered medications for this visit  Review of Systems      Objective:  Vitals:    12/23/22 0754   BP: 116/74   Pulse: 91   Resp: 18   Temp: (!) 97 3 °F (36 3 °C)   SpO2: 98%      Physical Exam  Constitutional:       Appearance: She is well-developed  HENT:      Head: Normocephalic and atraumatic  Eyes:      Pupils: Pupils are equal, round, and reactive to light  Neck:      Thyroid: No thyromegaly  Trachea: No tracheal deviation  Cardiovascular:      Rate and Rhythm: Normal rate and regular rhythm  Heart sounds: Normal heart sounds  No murmur heard  No gallop  Pulmonary:      Effort: No respiratory distress  Breath sounds: No wheezing or rales  Abdominal:      General: Bowel sounds are normal       Palpations: Abdomen is soft  Tenderness: There is no abdominal tenderness  Musculoskeletal:         General: No tenderness or deformity  Normal range of motion  Cervical back: Normal range of motion and neck supple     Skin: General: Skin is warm  Neurological:      Mental Status: She is alert and oriented to person, place, and time  Coordination: Coordination normal    Psychiatric:         Judgment: Judgment normal            Patient Instructions   No symptoms and preventive maintenance up-to-date  "Routine "labs  Yearly follow-up

## 2022-12-28 ENCOUNTER — TELEPHONE (OUTPATIENT)
Dept: INTERNAL MEDICINE CLINIC | Facility: CLINIC | Age: 67
End: 2022-12-28

## 2022-12-28 DIAGNOSIS — R31.29 MICROSCOPIC HEMATURIA: Primary | ICD-10-CM

## 2022-12-28 NOTE — TELEPHONE ENCOUNTER
----- Message from Debbie Givens MD sent at 12/28/2022 12:59 PM EST -----  Please call the patient regarding her abnormal result  Persistent blood in urine    I know she had a normal scan a year ago but she ought to see a urologist

## 2023-01-03 DIAGNOSIS — R31.29 MICROSCOPIC HEMATURIA: Primary | ICD-10-CM

## 2023-02-13 NOTE — PROGRESS NOTES
2/15/2023      Chief Complaint   Patient presents with   • Microhematuria     Assessment and Plan    1  Microhematuria  - S/p negative work-up with cystoscopy and CT urogram in   - UA micro from 22 showing persistent microhematuria with 2-4 RBCs  - Urine dip today trace blood  - She denies any episodes of gross hematuria  -Recommend continued yearly urine testing with PCP  -Should she have persistent microhematuria can pursue repeat work-up 3 to 5 years from prior or sooner should she develop any gross hematuria  History of Present Illness  Flash Kim is a 75 y o  female here for follow up evaluation of microhematuria  She previously saw our office for similar issue and underwent negative work-up including upper tract imaging and cystoscopy in   Recent urinalysis has shown persistent microhematuria  She denies any urinary complaints or episodes of gross hematuria  Review of Systems   Constitutional: Negative for chills and fever  Respiratory: Negative for shortness of breath  Cardiovascular: Negative for chest pain  Gastrointestinal: Negative for abdominal pain  Genitourinary: Negative for difficulty urinating, dysuria, flank pain, frequency, hematuria and urgency  Neurological: Negative for dizziness       Past Medical History  Past Medical History:   Diagnosis Date   • Colon polyp    • Medical history reviewed with no changes    • Osteoporosis    • Thyroglossal cyst        Past Social History  Past Surgical History:   Procedure Laterality Date   • BREAST BIOPSY Left 2018    benign   • CATARACT EXTRACTION     • COLONOSCOPY      2017   • CYSTOSCOPY  2021    Dr Rafael Hidalgo    • DENTAL IMPLANT     • EGD  2022   • THROAT SURGERY       Social History     Tobacco Use   Smoking Status Former   • Packs/day: 0 25   • Years: 33 00   • Pack years: 8 25   • Types: Cigarettes   • Start date: 3/17/1971   • Quit date: 3/17/2004   • Years since quittin 9   Smokeless Tobacco Never   Tobacco Comments    quit        Past Family History  Family History   Problem Relation Age of Onset   • Dementia Mother    • Diabetes Father    • Breast cancer Cousin 39   • Breast cancer Cousin 39   • No Known Problems Maternal Aunt    • No Known Problems Maternal Aunt    • No Known Problems Maternal Aunt    • Colon cancer Neg Hx    • Ovarian cancer Neg Hx    • Uterine cancer Neg Hx    • Cervical cancer Neg Hx        Past Social history  Social History     Socioeconomic History   • Marital status:      Spouse name: Not on file   • Number of children: Not on file   • Years of education: Not on file   • Highest education level: Not on file   Occupational History   • Not on file   Tobacco Use   • Smoking status: Former     Packs/day: 0 25     Years: 33 00     Pack years: 8 25     Types: Cigarettes     Start date: 3/17/1971     Quit date: 3/17/2004     Years since quittin 9   • Smokeless tobacco: Never   • Tobacco comments:     quit    Vaping Use   • Vaping Use: Never used   Substance and Sexual Activity   • Alcohol use: Yes     Alcohol/week: 14 0 standard drinks     Types: 14 Cans of beer per week     Comment: 2 per day  I filled this info out for Dr Michelina Meckel!    • Drug use: Never   • Sexual activity: Not Currently     Partners: Female     Birth control/protection: Post-menopausal   Other Topics Concern   • Not on file   Social History Narrative   • Not on file     Social Determinants of Health     Financial Resource Strain: Not on file   Food Insecurity: Not on file   Transportation Needs: Not on file   Physical Activity: Not on file   Stress: No Stress Concern Present   • Feeling of Stress : Not at all   Social Connections: Not on file   Intimate Partner Violence: Not on file   Housing Stability: Not on file       Current Medications  Current Outpatient Medications   Medication Sig Dispense Refill   • calcium carbonate (OS-CARTER) 600 MG tablet Take 600 mg by mouth 2 (two) times a day with meals     • Cholecalciferol (VITAMIN D3) 1000 units CAPS Take by mouth     • COLLAGEN PO Take 5,000 mcg by mouth daily     • folic acid (FOLVITE) 1 mg tablet Take by mouth daily       No current facility-administered medications for this visit  Allergies  No Known Allergies      The following portions of the patient's history were reviewed and updated as appropriate: allergies, current medications, past medical history, past social history, past surgical history and problem list       Vitals  Vitals:    02/15/23 0805   BP: 122/80   BP Location: Left arm   Patient Position: Sitting   Cuff Size: Standard   Weight: 55 8 kg (123 lb)   Height: 5' 3" (1 6 m)           Physical Exam  Physical Exam  Constitutional:       Appearance: Normal appearance  HENT:      Head: Normocephalic and atraumatic  Right Ear: External ear normal       Left Ear: External ear normal       Nose: Nose normal    Eyes:      General: No scleral icterus  Conjunctiva/sclera: Conjunctivae normal    Cardiovascular:      Pulses: Normal pulses  Pulmonary:      Effort: Pulmonary effort is normal    Musculoskeletal:         General: Normal range of motion  Cervical back: Normal range of motion  Neurological:      General: No focal deficit present  Mental Status: She is alert and oriented to person, place, and time  Psychiatric:         Mood and Affect: Mood normal          Behavior: Behavior normal          Thought Content:  Thought content normal          Judgment: Judgment normal            Results  Recent Results (from the past 1 hour(s))   POCT Measure PVR    Collection Time: 02/15/23  8:16 AM   Result Value Ref Range    POST-VOID RESIDUAL VOLUME, ML POC 24 mL   POCT urine dip    Collection Time: 02/15/23  8:16 AM   Result Value Ref Range    LEUKOCYTE ESTERASE,UA trace     NITRITE,UA -     SL AMB POCT UROBILINOGEN 0 2     POCT URINE PROTEIN -      PH,UA 5     BLOOD,UA trace     SPECIFIC GRAVITY,UA 1 020 [address] [facility] -     GLUCOSE, UA -      COLOR,UA yellow     CLARITY,UA clear    ]  No results found for: PSA  Lab Results   Component Value Date    CALCIUM 9 8 12/23/2022    K 3 8 12/23/2022    CO2 28 12/23/2022     12/23/2022    BUN 22 12/23/2022    CREATININE 0 86 12/23/2022     Lab Results   Component Value Date    WBC 7 14 12/23/2022    HGB 13 7 12/23/2022    HCT 43 8 12/23/2022    MCV 92 12/23/2022     12/23/2022           Orders  Orders Placed This Encounter   Procedures   • POCT Measure PVR   • POCT urine dip       Brittani Hammer

## 2023-02-15 ENCOUNTER — OFFICE VISIT (OUTPATIENT)
Dept: UROLOGY | Facility: CLINIC | Age: 68
End: 2023-02-15

## 2023-02-15 VITALS
DIASTOLIC BLOOD PRESSURE: 80 MMHG | WEIGHT: 123 LBS | HEIGHT: 63 IN | BODY MASS INDEX: 21.79 KG/M2 | SYSTOLIC BLOOD PRESSURE: 122 MMHG

## 2023-02-15 DIAGNOSIS — N95.2 ATROPHIC VAGINITIS: ICD-10-CM

## 2023-02-15 DIAGNOSIS — R31.29 OTHER MICROSCOPIC HEMATURIA: ICD-10-CM

## 2023-02-15 DIAGNOSIS — R31.29 MICROSCOPIC HEMATURIA: Primary | ICD-10-CM

## 2023-02-15 LAB
BACTERIA UR QL AUTO: ABNORMAL /HPF
BILIRUB UR QL STRIP: NEGATIVE
CLARITY UR: CLEAR
COLOR UR: ABNORMAL
GLUCOSE UR STRIP-MCNC: NEGATIVE MG/DL
HGB UR QL STRIP.AUTO: NEGATIVE
KETONES UR STRIP-MCNC: NEGATIVE MG/DL
LEUKOCYTE ESTERASE UR QL STRIP: NEGATIVE
MUCOUS THREADS UR QL AUTO: ABNORMAL
NITRITE UR QL STRIP: NEGATIVE
NON-SQ EPI CELLS URNS QL MICRO: ABNORMAL /HPF
PH UR STRIP.AUTO: 7 [PH]
POST-VOID RESIDUAL VOLUME, ML POC: 24 ML
PROT UR STRIP-MCNC: ABNORMAL MG/DL
RBC #/AREA URNS AUTO: ABNORMAL /HPF
SL AMB  POCT GLUCOSE, UA: ABNORMAL
SL AMB LEUKOCYTE ESTERASE,UA: ABNORMAL
SL AMB POCT BILIRUBIN,UA: ABNORMAL
SL AMB POCT BLOOD,UA: ABNORMAL
SL AMB POCT CLARITY,UA: CLEAR
SL AMB POCT COLOR,UA: YELLOW
SL AMB POCT KETONES,UA: ABNORMAL
SL AMB POCT NITRITE,UA: ABNORMAL
SL AMB POCT PH,UA: 5
SL AMB POCT SPECIFIC GRAVITY,UA: 1.02
SL AMB POCT URINE PROTEIN: ABNORMAL
SL AMB POCT UROBILINOGEN: 0.2
SP GR UR STRIP.AUTO: 1.01 (ref 1–1.03)
UROBILINOGEN UR STRIP-ACNC: <2 MG/DL
WBC #/AREA URNS AUTO: ABNORMAL /HPF

## 2023-04-03 ENCOUNTER — TELEPHONE (OUTPATIENT)
Dept: OTHER | Facility: OTHER | Age: 68
End: 2023-04-03

## 2023-04-03 NOTE — TELEPHONE ENCOUNTER
Patient would like to know if Dr Mcdermott Parents wants her to come in to be seen to monitor  her bile duct issue    Please call back

## 2023-04-04 ENCOUNTER — PREP FOR PROCEDURE (OUTPATIENT)
Dept: GASTROENTEROLOGY | Facility: CLINIC | Age: 68
End: 2023-04-04

## 2023-04-04 DIAGNOSIS — D13.5 AMPULLARY ADENOMA: Primary | ICD-10-CM

## 2023-04-04 NOTE — TELEPHONE ENCOUNTER
Scheduled date of EGD(as of today): 4/11/23  Physician performing EGD: Dr Rodriguez  Location of EGD: [facility]  Instructions reviewed with patient by: John Bedolla to [address]  [email or web address]  Clearances: N/A

## 2023-04-11 RX ORDER — SODIUM CHLORIDE, SODIUM LACTATE, POTASSIUM CHLORIDE, CALCIUM CHLORIDE 600; 310; 30; 20 MG/100ML; MG/100ML; MG/100ML; MG/100ML
125 INJECTION, SOLUTION INTRAVENOUS CONTINUOUS
Status: CANCELLED | OUTPATIENT
Start: 2023-04-11

## 2023-05-16 ENCOUNTER — HOSPITAL ENCOUNTER (OUTPATIENT)
Dept: MAMMOGRAPHY | Facility: CLINIC | Age: 68
Discharge: HOME | End: 2023-05-16

## 2023-05-16 ENCOUNTER — HOSPITAL ENCOUNTER (OUTPATIENT)
Dept: ULTRASOUND IMAGING | Facility: CLINIC | Age: 68
Discharge: HOME | End: 2023-05-16

## 2023-05-16 VITALS — BODY MASS INDEX: 21.79 KG/M2 | WEIGHT: 123 LBS | HEIGHT: 63 IN

## 2023-05-16 DIAGNOSIS — R92.8 ABNORMAL MAMMOGRAM: ICD-10-CM

## 2023-05-17 DIAGNOSIS — N60.09 COMPLEX CYST OF BREAST: Primary | ICD-10-CM

## 2023-06-09 ENCOUNTER — TELEPHONE (OUTPATIENT)
Age: 68
End: 2023-06-09

## 2023-06-09 NOTE — TELEPHONE ENCOUNTER
----- Message from [facility] sent at 5/25/2023  1:47 PM EDT -----  Please call this patient to notify her of her results which were not viewed in [address]  Thanks

## 2023-07-07 ENCOUNTER — ANESTHESIA (OUTPATIENT)
Dept: GASTROENTEROLOGY | Facility: HOSPITAL | Age: 68
End: 2023-07-07

## 2023-07-07 ENCOUNTER — ANESTHESIA EVENT (OUTPATIENT)
Dept: GASTROENTEROLOGY | Facility: HOSPITAL | Age: 68
End: 2023-07-07

## 2023-07-07 ENCOUNTER — HOSPITAL ENCOUNTER (OUTPATIENT)
Dept: GASTROENTEROLOGY | Facility: HOSPITAL | Age: 68
Setting detail: OUTPATIENT SURGERY
End: 2023-07-07
Attending: INTERNAL MEDICINE
Payer: MEDICARE

## 2023-07-07 VITALS
RESPIRATION RATE: 16 BRPM | OXYGEN SATURATION: 98 % | HEART RATE: 85 BPM | TEMPERATURE: 97.2 F | DIASTOLIC BLOOD PRESSURE: 81 MMHG | SYSTOLIC BLOOD PRESSURE: 135 MMHG

## 2023-07-07 DIAGNOSIS — D13.5 AMPULLARY ADENOMA: ICD-10-CM

## 2023-07-07 PROCEDURE — 88305 TISSUE EXAM BY PATHOLOGIST: CPT | Performed by: PATHOLOGY

## 2023-07-07 PROCEDURE — 43239 EGD BIOPSY SINGLE/MULTIPLE: CPT | Performed by: INTERNAL MEDICINE

## 2023-07-07 RX ORDER — PROPOFOL 10 MG/ML
INJECTION, EMULSION INTRAVENOUS AS NEEDED
Status: DISCONTINUED | OUTPATIENT
Start: 2023-07-07 | End: 2023-07-07

## 2023-07-07 RX ORDER — SODIUM CHLORIDE 9 MG/ML
INJECTION, SOLUTION INTRAVENOUS CONTINUOUS PRN
Status: DISCONTINUED | OUTPATIENT
Start: 2023-07-07 | End: 2023-07-07

## 2023-07-07 RX ORDER — PROPOFOL 10 MG/ML
INJECTION, EMULSION INTRAVENOUS CONTINUOUS PRN
Status: DISCONTINUED | OUTPATIENT
Start: 2023-07-07 | End: 2023-07-07

## 2023-07-07 RX ORDER — LIDOCAINE HYDROCHLORIDE 20 MG/ML
INJECTION, SOLUTION EPIDURAL; INFILTRATION; INTRACAUDAL; PERINEURAL AS NEEDED
Status: DISCONTINUED | OUTPATIENT
Start: 2023-07-07 | End: 2023-07-07

## 2023-07-07 RX ADMIN — PROPOFOL 150 MCG/KG/MIN: 10 INJECTION, EMULSION INTRAVENOUS at 12:06

## 2023-07-07 RX ADMIN — LIDOCAINE HYDROCHLORIDE 80 MG: 20 INJECTION, SOLUTION EPIDURAL; INFILTRATION; INTRACAUDAL; PERINEURAL at 12:06

## 2023-07-07 RX ADMIN — SODIUM CHLORIDE: 9 INJECTION, SOLUTION INTRAVENOUS at 11:55

## 2023-07-07 RX ADMIN — PROPOFOL 80 MG: 10 INJECTION, EMULSION INTRAVENOUS at 12:06

## 2023-07-07 NOTE — ANESTHESIA POSTPROCEDURE EVALUATION
Post-Op Assessment Note    CV Status:  Stable  Pain Score: 0    Pain management: adequate     Mental Status:  Sleepy   Hydration Status:  Stable   PONV Controlled:  Controlled      Post Op Vitals Reviewed: Yes      Staff: Anesthesiologist, CRNA         No notable events documented.     BP      Temp      Pulse     Resp      SpO2

## 2023-07-07 NOTE — ANESTHESIA PREPROCEDURE EVALUATION
Procedure:  EGD    Relevant Problems   CARDIO   (+) Pure hypercholesterolemia      MUSCULOSKELETAL   (+) Primary osteoarthritis      Ampullary adenoma  Former smoker   Physical Exam    Airway    Mallampati score: II  TM Distance: >3 FB  Neck ROM: full     Dental   Comment: Denies loose teeth,     Cardiovascular      Pulmonary      Other Findings        Anesthesia Plan  ASA Score- 2     Anesthesia Type- IV sedation with anesthesia with ASA Monitors. Additional Monitors:   Airway Plan:           Plan Factors-Exercise tolerance (METS): >4 METS. Chart reviewed. Existing labs reviewed. Patient summary reviewed. Patient is not a current smoker. Induction- intravenous. Postoperative Plan-     Informed Consent- Anesthetic plan and risks discussed with patient. I personally reviewed this patient with the CRNA. Discussed and agreed on the Anesthesia Plan with the CRNA. Grace Santana

## 2023-07-10 ENCOUNTER — TELEPHONE (OUTPATIENT)
Dept: GASTROENTEROLOGY | Facility: CLINIC | Age: 68
End: 2023-07-10

## 2023-07-10 NOTE — TELEPHONE ENCOUNTER
Scheduled date of ERCP(as of today):09/15/2023  Physician performing [address]  Location of EGD: [facility]  Instructions reviewed with patient by: Anastasiia Bhardwaj and sent in the mail  Clearances:  N/A    Patient would prefer late morning or early afternoon due to transportation

## 2023-07-11 PROCEDURE — 88305 TISSUE EXAM BY PATHOLOGIST: CPT | Performed by: PATHOLOGY

## 2023-07-12 ENCOUNTER — TELEPHONE (OUTPATIENT)
Dept: GASTROENTEROLOGY | Facility: CLINIC | Age: 68
End: 2023-07-12

## 2023-08-28 ENCOUNTER — TELEPHONE (OUTPATIENT)
Dept: DERMATOLOGY | Facility: CLINIC | Age: 68
End: 2023-08-28

## 2023-08-28 ENCOUNTER — OFFICE VISIT (OUTPATIENT)
Age: 68
End: 2023-08-28
Payer: MEDICARE

## 2023-08-28 VITALS — WEIGHT: 123 LBS | HEIGHT: 63 IN | BODY MASS INDEX: 21.79 KG/M2

## 2023-08-28 DIAGNOSIS — Z13.89 SCREENING FOR SKIN CONDITION: Primary | ICD-10-CM

## 2023-08-28 DIAGNOSIS — R20.2 NOTALGIA PARESTHETICA: ICD-10-CM

## 2023-08-28 DIAGNOSIS — L82.1 SEBORRHEIC KERATOSIS: ICD-10-CM

## 2023-08-28 DIAGNOSIS — L72.0 MILIA: ICD-10-CM

## 2023-08-28 DIAGNOSIS — D18.01 CHERRY ANGIOMA: ICD-10-CM

## 2023-08-28 DIAGNOSIS — D22.9 MULTIPLE NEVI: ICD-10-CM

## 2023-08-28 PROCEDURE — 99213 OFFICE O/P EST LOW 20 MIN: CPT | Performed by: DERMATOLOGY

## 2023-08-28 NOTE — PATIENT INSTRUCTIONS
ACTINIC KERATOSIS    Actinic keratoses are very common on sites repeatedly exposed to the sun, especially the backs of the hands and the face, most often affecting the ears, nose, cheeks, upper lip, vermilion of the lower lip, temples, forehead and balding scalp. In severely chronically sun-damaged individuals, they may also be found on the upper trunk, upper and lower limbs, and dorsum of feet. We discussed the theoretical premalignant (“pre-cancerous”) nature and etiology of these growths. We discussed the prevailing notion that actinic keratoses are a reflection of abnormal skin cell development due to DNA damage by short wavelength UVB. They are more likely to appear if the immune function is poor, due to aging, recent sun exposure, predisposing disease or certain drugs. We discussed that the main concern is that actinic keratoses may predispose to squamous cell carcinoma. It is rare for a solitary actinic keratosis to evolve to squamous cell carcinoma (SCC), but the risk of SCC occurring at some stage in a patient with more than 10 actinic keratoses is thought to be about 10 to 15%. A tender, thickened, ulcerated or enlarging actinic keratosis is suspicious of SCC. Actinic keratoses may be prevented by strict sun protection. If already present, keratoses may improve with a very high sun protection factor (50+) broad-spectrum sunscreen applied at least daily to affected areas, year-round. We recommend that UPF-rated clothing and hats and sunglasses be worn whenever possible and that a sunscreen-moisturizer combination product such as Neutrogena Daily Defense be applied at least three times a day.     We performed a thorough discussion of treatment options and specific risk/benefits/alternatives including but not limited to medical “field” treatment with medications such as the following:    Topical “field area” medications such as 5-fluorouracil or Aldara (specifically, the trouble with long-term compliance, blistering and local skin reaction versus the convenience of at-home therapy and that field therapy “gets what is not yet seen”). Cryotherapy (specifically, local pain, scarring, dyspigmentation, blistering, possible superinfection, and treats “only what we see” versus directed treatment today). Photodynamic therapy (specifically, local pain, scarring, dyspigmentation, blistering, possible superinfection, need to schedule for a later date, and time spent in the office versus field therapy that “gets what is not yet seen”). BASAL CELL CARCINOMA    What is basal cell carcinoma? Basal cell carcinoma (BCC) is a common, locally invasive, keratinocytic, or non-melanoma, skin cancer. It is also known as rodent ulcer and basalioma. Patients with BCC often develop multiple primary tumours over time. Who gets basal cell carcinoma? Risk factors for BCC include:  Age and sex: BCCs are particularly prevalent in elderly males. However, they also affect females and younger adults   Previous BCC or other form of skin cancer (squamous cell carcinoma, melanoma)   Sun damage (photoaging, actinic keratoses)   Repeated prior episodes of sunburn   Fair skin, blue eyes and blond or red hair--note; BCC can also affect darker skin types   Previous cutaneous injury, thermal burn, disease (eg cutaneous lupus, sebaceous naevus)   Inherited syndromes: BCC is a particular problem for families with basal cell naevus syndrome (Gorlin syndrome), Zjppf-Pvgté-Qqtbowgq syndrome, Rombo syndrome, Oley syndrome and xeroderma pigmentosum   Other risk factors include ionising radiation, exposure to arsenic, and immune suppression due to disease or medicines    What causes basal cell carcinoma?   The cause of BCC is multifactorial.  Most often, there are DNA mutations in the patched [facility]) tumour suppressor gene, part of hedgehog signaling pathway   These may be triggered by exposure to ultraviolet radiation   Various spontaneous and inherited gene defects predispose to [facility]    What are the clinical features of basal cell carcinoma? BCC is a locally invasive skin tumour. The main characteristics are:  Slowly growing plaque or nodule   Skin coloured, pink or pigmented   Varies in size from a few millimetres to several centimetres in diameter   Spontaneous bleeding or ulceration  BCC is very rarely a threat to life. A tiny proportion of BCCs grow rapidly, invade deeply, and/or metastasise to local lymph nodes. Types of basal cell carcinoma  There are several distinct clinical types of BCC, and over 20 histological growth patterns of BCC. Nodular BCC  Most common type of facial BCC   Shiny or pearly nodule with a smooth surface   May have central depression or ulceration, so its edges appear rolled   Blood vessels cross its surface   Cystic variant is soft, with jelly-like contents   Micronodular, microcystic and infiltrative types are potentially aggressive subtypes   Also known as nodulocystic carcinoma  Superficial BCC  Most common type in younger adults   Most common type on upper trunk and shoulders   Slightly scaly, irregular plaque   Thin, translucent rolled border   Multiple microerosions  Morphoeaform BCC  Usually found in mid-facial sites   Waxy, scar-like plaque with indistinct borders   Wide and deep subclinical extension   May infiltrate cutaneous nerves (perineural spread)   Also known as morpheic, morphoeiform or sclerosing BCC  Basosquamous carcinoma  Mixed basal cell carcinoma (BCC) and squamous cell carcinoma (SCC)   Infiltrative growth pattern   Potentially more aggressive than other forms of BCC   Also known as basisquamous carcinoma and mixed basal-squamous cell carcinoma       Complications of basal cell carcinoma    Recurrent BCC  Recurrence of BCC after initial treatment is not uncommon. Characteristics of recurrent BCC often include:   Incomplete excision or narrow margins at primary excision   Morphoeic, micronodular, and infiltrative subtypes   Location on head and neck    Advanced BCC  Advanced BCCs are large, often neglected tumours. They may be several centimetres in diameter   They may be deeply infiltrating into tissues below the skin   They are difficult or impossible to treat surgically    Metastatic BCC  Very rare   Primary tumour is often large, neglected or recurrent, located on head and neck, with aggressive subtype   May have had multiple prior treatments   May arise in site exposed to ionising radiation   Can be fatal    How is basal cell carcinoma diagnosed? BCC is diagnosed clinically by the presence of a slowly enlarging skin lesion with typical appearance. The diagnosis and  by a diagnostic biopsy or following excision. Some typical superficial BCCs on trunk and limbs are clinically diagnosed and have non-surgical treatment without histology. What is the treatment for primary basal cell carcinoma? The treatment for a [address] [place] [address] depends on its type, size and location, the number to be treated, patient factors, and the preference or expertise of the doctor. Most BCCs are treated surgically. Long-term follow-up is recommended to check for new lesions and recurrence; the latter may be unnecessary if histology has reported wide clear margins. Excision biopsy  Excision means the lesion is cut out and the skin stitched up. Most appropriate treatment for nodular, infiltrative and morphoeic BCCs   Should include 3 to 5 mm margin of normal skin around the tumour   Very large lesions may require flap or skin graft to repair the defect   Pathologist will report deep and lateral margins   Further surgery is recommended for lesions that are incompletely excised    Mohs micrographically controlled excision  Mohs micrographically controlled surgery involves examining carefully marked excised tissue under the microscope, layer by layer, to ensure complete excision.   Very high cure rates achieved by trained Mohs surgeons   Used in high-risk areas of the face around eyes, lips and nose   Suitable for ill-defined, morphoeic, infiltrative and recurrent subtypes   Large defects are repaired by flap or skin graft    Superficial skin surgery  Superficial skin surgery comprises shave, curettage, and electrocautery. It is a rapid technique using local anaesthesia and does not require sutures. Suitable for small, well-defined nodular or superficial BCCs   Lesions are usually located on trunk or limbs   Wound is left open to heal by secondary intention   Moist wound dressings lead to healing within a few weeks   Eventual scar quality variable    Cryotherapy  Cryotherapy is the treatment of a superficial skin lesion by freezing it, usually with liquid nitrogen. Suitable for small superficial BCCs on covered areas of trunk and limbs   Best avoided for BCCs on head and neck, and distal to knees   Double freeze-thaw technique   Results in a blister that crusts over and heals within several weeks. Leaves permanent white mac    Photodynamic therapy  Photodynamic therapy (PDT) refers to a technique in which [facility] is treated with a photosensitising chemical, and exposed to light several hours later. Topical photosensitisers include aminolevulinic acid lotion and methyl aminolevulinate cream   Suitable for low-risk small, superficial BCCs   Best avoided if tumour in site at high risk of recurrence   Results in inflammatory reaction, maximal 3-4 days after procedure   Treatment repeated 7 days after initial treatment   Excellent cosmetic results    Imiquimod cream  Imiquimod is an immune response modifier. Best used for superficial BCCs less than 2 cm diameter   Applied three to five times each week, for 6-16 weeks   Results in a variable inflammatory reaction, maximal at three weeks   Minimal scarring is usual    Fluorouracil cream  5-Fluorouracil cream is a topical cytotoxic agent.   Used to treat small superficial basal cell carcinomas Requires prolonged course, eg twice daily for 6-12 weeks   Causes inflammatory reaction   Has high recurrence rates    Radiotherapy  Radiotherapy or X-ray treatment can be used to treat primary BCCs or as adjunctive treatment if margins are incomplete. Mainly used if surgery is not suitable   Best avoided in young patients and in genetic conditions predisposing to skin cancer   Best cosmetic results achieved using multiple fractions   Typically, patient attends once-weekly for several weeks   Causes inflammatory reaction followed by scar   Risk of radiodermatitis, late recurrence, and new tumours    What is the treatment for advanced or metastatic basal cell carcinoma? Locally advanced primary, recurrent or metastatic BCC requires multidisciplinary consultation. Often a combination of treatments is used. Surgery   Radiotherapy   Targeted therapy  Targeted therapy refers to the hedgehog signalling pathway inhibitors, vismodegib and sonidegib. These drugs have some important risks and side effects. How can basal cell carcinoma be prevented? The most important way to prevent BCC is to avoid sunburn. This is especially important in childhood and early life. Fair skinned individuals and those with a personal or family history of BCC should protect their skin from sun exposure daily, year-round and lifelong. Stay indoors or under the shade in the middle of the day   Wear covering clothing   Apply high protection factor SPF50+ broad-spectrum sunscreens generously to exposed skin if outdoors   Avoid indoor tanning (sun beds, solaria)  Oral nicotinamide (vitamin B3) in a dose of 500 mg twice daily may reduce the number and severity of BCCs. What is the outlook for basal cell carcinoma? Most BCCs are cured by treatment. Cure is most likely if treatment is undertaken when the lesion is small.   About 50% of people with BCC develop a second one within 3 years of the first. They are also at increased risk of other skin cancers, especially melanoma. Regular self-skin examinations and long-term annual skin checks by an experienced health professional are recommended. SQUAMOUS CELL CARCINOMA    What is cutaneous squamous cell carcinoma? Cutaneous squamous cell carcinoma (SCC) is a common type of keratinocyte or non-melanoma skin cancer. It is derived from cells within the epidermis that make keratin -- the horny protein that makes up skin, hair and nails. Cutaneous SCC is an invasive disease, referring to cancer cells that have grown beyond the epidermis. SCC can sometimes metastasise and may prove fatal.  Intraepidermal carcinoma (cutaneous SCC in situ) and mucosal SCC are considered elsewhere. Who gets cutaneous squamous cell carcinoma? Risk factors for cutaneous SCC include:  Age and sex: SCCs are particularly prevalent in elderly males. However, they also affect females and younger adults. Previous SCC or another form of skin cancer (basal cell carcinoma, melanoma) are a strong predictor for further skin cancers. Actinic keratoses   Outdoor occupation or recreation   Smoking   Fair skin, blue eyes and blond or red hair   Previous cutaneous injury, thermal burn, disease (eg cutaneous lupus, epidermolysis bullosa, leg ulcer)   Inherited syndromes: SCC is a particular problem for families with xeroderma pigmentosum and albinism   Other risk factors include ionising radiation, exposure to arsenic, and immune suppression due to disease (eg chronic lymphocytic leukaemia) or medicines. Organ transplant recipients have a massively increased risk of developing SCC. What causes cutaneous squamous cell carcinoma? More than 90% of cases of SCC are associated with numerous DNA mutations in multiple somatic genes. Mutations in the p53 tumour suppressor gene are caused by exposure to ultraviolet radiation (UV), especially UVB (known as signature 7).  Other signature mutations relate to cigarette smoking, ageing and immune suppression (eg, to drugs such as azathioprine). Mutations in signalling pathways affect the epidermal growth factor receptor, OG, Fyn, and [id number] signalling. Beta-genus human papillomaviruses (wart virus) are thought to play a role in SCC arising in immune-suppressed populations. ?-HPV and HPV subtypes 5, 8, 17, 20, 24, and 38 have also been associated with an increased risk of cutaneous SCC in immunocompetent individuals. What are the clinical features of cutaneous squamous cell carcinoma? Cutaneous SCCs present as enlarging scaly or crusted lumps. They usually arise within pre-existing actinic keratosis or intraepidermal carcinoma. They grow over weeks to months   They may ulcerate   They are often tender or painful   Located on sun-exposed sites, particularly the face, lips, ears, hands, forearms and lower legs   Size varies from a few millimetres to several centimetres in diameter. Types of cutaneous squamous cell carcinoma  Distinct clinical types of invasive cutaneous SCC include:  Cutaneous horn -- the horn is due to excessive production of keratin   Keratoacanthoma (KA) -- a rapidly growing keratinising nodule that may resolve without treatment   Carcinoma cuniculatum (‘verrucous carcinoma’), a slow-growing, warty tumour on the sole of the foot. Multiple eruptive SCC/KA-like lesions arising in syndromes, such as multiple self-healing squamous epitheliomas of Irene-Smith and Grzybowski syndrome  The pathologist may classify a tumour as well differentiated, moderately well differentiated, poorly differentiated or anaplastic cutaneous SCC. There are other variants. Classification of squamous cell carcinoma by risk  Cutaneous SCC is classified as low-risk or high-risk, depending on the chance of tumour recurrence and metastasis.  Characteristics of high-risk SCC include:  High-risk cutaneous squamous cell carcinoma has the following characteristics:  Diameter greater than or equal to 2 cm   Location on the ear, vermilion of the lip, central face, hands, feet, genitalia   Arising in elderly or immune suppressed patient   Histological thickness greater than 2 mm, poorly differentiated histology, or with the invasion of the subcutaneous tissue, nerves and blood vessels  Metastatic SCC is found in regional lymph nodes (80%), lungs, liver, brain, bones and skin. Staging cutaneous squamous cell carcinoma  In 2011, the [address] on Cancer (AJCC) published a new staging systemic for cutaneous SCC for the 7th Edition of the AJCC manual. This evaluates the dimensions of the original primary tumour (T) and its metastases to lymph nodes (N). Tumour staging for cutaneous SCC  TX: Th Primary tumour cannot be assessed  T0: No evidence of a primary tumour  Tis: Carcinoma in situ  T1: Tumour ? 2cm without high-risk features  T2: Tumour ? 2cm; or; Tumour ? 2 cm with high-risk features  T3: Tumour with the invasion of maxilla, mandible, orbit or temporal bone  T4: Tumour with the invasion of axial or appendicular skeleton or perineural invasion of skull base    Koby staging for cutaneous SCC  NX: Regional lymph nodes cannot be assessed  N0: No regional lymph node metastasis  N1: Metastasis in one local lymph node ? 3cm  N2: Metastasis in one local lymph node ? 3cm; or; Metastasis in >1 local lymph node ? 6cm  N3: Metastasis in lymph node ? 6cm    How is squamous cell carcinoma diagnosed? Diagnosis of cutaneous SCC is based on clinical features. The diagnosis and histological subtype are confirmed pathologically by diagnostic biopsy or following excision. See squamous cell carcinoma - pathology. Patients with high-risk SCC may also undergo staging investigations to determine whether it has spread to lymph nodes or elsewhere.  These may include:  Imaging using ultrasound scan, X-rays, CT scans, MRI scans   Lymph node or other tissue biopsies    What is the treatment for cutaneous squamous cell carcinoma? Cutaneous SCC is nearly always treated surgically. Most cases are excised with a 3-10 mm margin of normal tissue around a visible tumour. A flap or skin graft may be needed to repair the defect. Other methods of removal include:  Shave, curettage, and electrocautery for low-risk tumours on trunk and limbs   Aggressive cryotherapy for very small, thin, low-risk tumours   Mohs micrographic surgery for large facial lesions with indistinct margins or recurrent tumours   Radiotherapy for an inoperable tumour, patients unsuitable for surgery, or as adjuvant    What is the treatment for advanced or metastatic squamous cell carcinoma? Locally advanced primary, recurrent or metastatic SCC requires multidisciplinary consultation. Often a combination of treatments is used. Surgery   Radiotherapy   Cemiplimab   Experimental targeted therapy using epidermal growth factor receptor inhibitors    How can cutaneous squamous cell carcinoma be prevented? There is a great deal of evidence to show that very careful sun protection at any time of life reduces the number of SCCs. This is particularly important in ageing, sun-damaged, fair skin; in patients that are immune suppressed; and in those who already have actinic keratoses or previous SCC. Stay indoors or under the shade in the middle of the day   Wear covering clothing   Apply high protection factor SPF50+ broad-spectrum sunscreens generously to exposed skin if outdoors   Avoid indoor tanning (sun beds, solaria)    Oral nicotinamide (vitamin B3) in a dose of 500 mg twice daily may reduce the number and severity of SCCs in people at high risk. Patients with multiple squamous cell carcinomas may be prescribed an oral retinoid (acitretin or isotretinoin). These reduce the number of tumours but have some nuisance side effects. What is the outlook for cutaneous squamous cell carcinoma? Most SCCs are cured by treatment.  A cure is most likely if treatment is undertaken when the lesion is small. The risk of recurrence or disease-associated death is greater for tumours that are > 20 mm in diameter and/or > 2 mm in thickness at the time of surgical excision. About 50% of people at high risk of SCC develop a second one within 5 years of the first. They are also at increased risk of other skin cancers, especially melanoma. Regular self-skin examinations and long-term annual skin checks by an experienced health professional are recommended. MILIUM     Assessment and Plan:  Based on a thorough discussion of this condition and the management approach to it (including a comprehensive discussion of the known risks, side effects and potential benefits of treatment), the patient (family) agrees to implement the following specific plan:  Benign and reassured    Assessment and Plan  A milium is a small cyst containing keratin (the skin protein); they are usually multiple and are then known as milia. These harmless cysts present as tiny pearly-white bumps just under the surface of the skin. Milia are common in all ages and both sexes. They most often arise on the face and are particularly prominent on the eyelids and cheeks, but they may occur elsewhere. There are various kinds of milia.  milia: Affect 40-50% of  babies, few to numerous lesions, often seen on the nose, but may also arise inside the mouth on the mucosa (Greyson pearls) or palate (Loc nodules) or more widely on the scalp, face and upper trunk, Heal spontaneously within a few weeks of birth. Primary milia in children and adults: found around eyelids, cheeks, forehead and genitalia, in young children, a row of milia may appear along the nasal crease, may clear in a few weeks or persist for months or longer.     Juvenile milia: associated with Rombo syndrome, basal cell naevus syndrome, Fycsq-Mmgxj-Nhsrrhwr syndrome, pachyonychia congenita, Britton syndrome and other genetic disorders, may be congenital (present at birth) or appear later in life. Milia en plaque: multiple milia appear on within an inflamed plaque up to several centimeters in diameter, usually found on an eyelid, behind the ear, on a cheek or jaw. 3. Affect children and adults, especially middle-aged women. 4. Sometimes associated with another skin disease including pseudoxanthoma elasticum, discoid lupus erythematosus, lichen planus. Multiple eruptive milia: crops of numerous milia appear over a few weeks to months, lesions may be asymptomatic or itchy, most often affect the face, upper arms and upper trunk. Traumatic milia: occur at the site of injury as skin heals, arise from eccrine sweat ducts, examples include thermal burns, dermabrasion, blistering rashes such as bullous pemphigoid, often seen on the back of hands and fingers in porphyria cutanea tarda, a milia-like calcified nodule may develop after  heel stick blood test.   Milia associated with drugs: may rarely follow the use of topical medication, such as phenols, hydroquinone, 5-fluorouracil cream, and a corticosteroid. Milia have a characteristic appearance. However, on occasion, a skin biopsy may be performed. This shows a small epidermoid cyst coming from a vellus hair follicle. Milia should be distinguished from other types of cyst, comedones, xanthelasma and syringomas. Colloid milia are treviño coloured bumps on cheeks and temples associated with excessive exposure to sunlight. They should also be distinguished from milia-like cysts noted on dermoscopy in seborrhoeic keratoses, papillomatous moles and some basal cell carcinomas. Milia do not need to be treated unless they are a cause for concern for the patient. They often clear up by themselves within a few months. Where possible, further trauma should be minimised to reduce the development of new lesions.   The lesion may be de-roofed using a sterile needle or blade and the contents squeezed or pricked out. They may be destroyed using diathermy and curettage, or cryotherapy. For widespread lesions, topical retinoids may be helpful. Chemical peels, dermabrasion and laser ablation have been reported to be effective when used for very extensive milia. Milia en plaque may improve with minocycline (a tetracycline antibiotic). NOTALGIA PARESTHETICA    Assessment and Plan:  Based on a thorough discussion of this condition and the management approach to it (including a comprehensive discussion of the known risks, side effects and potential benefits of treatment), the patient (family) agrees to implement the following specific plan:  Benign and reassured    Assessment and Plan:  Notalgia paresthetica is a condition where itch and changed sensation arise in the areas of skin on the medial aspect of the shoulder blade on either side of the back. Notalgia means pain in the back, and paraesthetica refers to burning pain, tingling or itch. It is also called thoracic cutaneous nerve entrapment syndrome. The nerves which supply sensation to the upper back emerge from the spinal cord (2nd to 6th thoracic segments) and run a long course up through the thick muscles of the back. They make a right-angled turn before reaching the skin. The nerves appear to be vulnerable to compression or traction. Partial compression or injury leads to the symptoms. Initial injury to the nerves may include:  Back injury  Herniated or "slipped" disc  Herpes zoster (shingles)  Sunburn  Myelopathy (muscular disease)  Small fiber neuropathy    Notalgia paresthetica often starts after a period of intense exercise leading to muscular stiffness, or a period of inactivity. Sometimes, a specific injury may be recalled. It is characterized by intense itch on the medial border of one scapula or both, ie, between the shoulder blades. This itch can be intermittent or continuous.  It is unrelieved by scratching, although the scratching and rubbing may be pleasurable. The affected area may spread to both shoulder blades and more widely over the back and shoulders. In many patients, there are no visible signs. Visible changes often arise from rubbing and scratching the affected area. These include:  Scratch marks  Hyperpigmentation (brown marks)  Hypopigmentation (white marks)  Lichen simplex (a type of eczema)  Scarring    There may be changed sensation in the affected area of skin in response to light touch, sharp objects, cold, and heat. There may be reduced or absent sweating in the affected area. Radiology such as X-ray, CT scan or MRI may demonstrate a degenerative vertebra or prolapsed disc in the area that corresponds to the nerve supply to the affected skin. In many cases, no abnormality is revealed. Treatment is not always necessary, and it is not always successful. Typical management may include the following:  Cooling lotions or creams as required (camphor and menthol)  Topical steroids to treat associated lichen simplex  Capsaicin cream - this depletes nerve endings of their chemical transmitters but requires frequent reapplication and causes unpleasant local side effects  Local anesthetic creams may provide temporary relief of symptoms  Amitriptyline or other oral tricyclic medications at night to help sleep and counteract neuropathic symptoms  Gabapentin, pregabalin or other anticonvulsants can relieve unpleasant burning or tingling sensations  Transcutaneous electrical nerve stimulation (TENS)  Surgical decompression of vertebral nerve impingement    Physical therapy with repetitive exercises and stretches for the upper back has been reported to be effective.     While sitting, cross arms and bend forwards to stretch upper back  Arms at sides, raise shoulders and rotate them forwards and backwards  Arms straight, rotate forwards 360 degrees and backwards 360 degrees  Rotate upper body left and right until stretch is felt and hold  Massage the muscles besides the spine in the affected area

## 2023-08-28 NOTE — PROGRESS NOTES
[facility] Note     Patient Name: Catina Ashford  Encounter Date: August 28, 2023     Have you been cared for by a [facility] in the last 3 years and, if so, which description applies to you? Yes. I have been here within the last 3 years, and my medical history has NOT changed since that time. I am FEMALE/of child-bearing potential.    REVIEW OF SYSTEMS:  Have you recently had or currently have any of the following? · No changes in my recent health. PAST MEDICAL HISTORY:  Have you personally ever had or currently have any of the following? If "YES," then please provide more detail. · No changes in my medical history. FAMILY HISTORY:  Any "first degree relatives" (parent, brother, sister, or child) with the following? • No changes in my family's known health. PATIENT EXPERIENCE:    • Do you want the Dermatologist to perform a COMPLETE skin exam today including a clinical examination under the "bra and underwear" areas? Yes  • If necessary, do we have your permission to call and leave a detailed message on your Preferred Phone number that includes your specific medical information? Yes      No Known Allergies   Current Outpatient Medications:   •  calcium carbonate (OS-CARTER) 600 MG tablet, Take 600 mg by mouth 2 (two) times a day with meals, Disp: , Rfl:   •  Cholecalciferol (VITAMIN D3) 1000 units CAPS, Take by mouth, Disp: , Rfl:   •  COLLAGEN PO, Take 5,000 mcg by mouth daily, Disp: , Rfl:   •  folic acid (FOLVITE) 1 mg tablet, Take by mouth daily, Disp: , Rfl:           • Whom besides the patient is providing clinical information about today's encounter?   o NO ADDITIONAL HISTORIAN (patient alone provided history)    Chief complaint: Patient is a 69 y/o female present for a routine skin exam without history of skin cancer and does not have any spots of concern for today.       Physical Exam and Assessment/Plan by Diagnosis:    MELANOCYTIC NEVI ("Moles")    Physical Exam:  • Anatomic Location Affected: Mostly on sun-exposed areas of the trunk and extremities  • Morphological Description:  Scattered, 1-4mm round to ovoid, symmetrical-appearing, even bordered, skin colored to dark brown macules/papules, mostly in sun-exposed areas  • Pertinent Positives:  • Pertinent Negatives: Additional History of Present Condition:  Present on exam    Assessment and Plan:  Based on a thorough discussion of this condition and the management approach to it (including a comprehensive discussion of the known risks, side effects and potential benefits of treatment), the patient (family) agrees to implement the following specific plan:  • Provided handout with information regarding the ABCDE's of moles   • Recommend routine skin exams every year   • Sun avoidance, protective clothing (known as UPF clothing), and the use of at least SPF 30 sunscreens is advised. Sunscreen should be reapplied every two hours when outside. SEBORRHEIC KERATOSIS; NON-INFLAMED    Physical Exam:  • Anatomic Location Affected:  scattered across trunk, extremities  • Morphological Description:  Flat and raised, waxy, smooth to warty textured, yellow to brownish-grey to dark brown to blackish, discrete, "stuck-on" appearing papules. • Pertinent Positives:  • Pertinent Negatives: Additional History of Present Condition:  Patient reports new bumps on the skin. Denies itch, burn, pain, bleeding or ulceration. Present constantly; nothing seems to make it worse or better. No prior treatment.       Assessment and Plan:  Based on a thorough discussion of this condition and the management approach to it (including a comprehensive discussion of the known risks, side effects and potential benefits of treatment), the patient (family) agrees to implement the following specific plan:  • Reassured benign      ANGIOMA ("CHERRY ANGIOMA")    Physical Exam:  • Anatomic Location: scattered across sun exposed areas of the trunk and extremities   • Morphologic Description: Firm red to reddish-blue discrete papules  • Pertinent Positives:  • Pertinent Negatives: Additional History of Present Condition:  Present on exam.     Assessment and Plan:  • Reassured benign    MILIUM     Physical Exam:  • Anatomic Location Affected: Face and neck  • Morphological Description:  2 mm White papules  • Pertinent Positives:  • Pertinent Negatives: Additional History of Present Condition:  Present on exam    Assessment and Plan:  Based on a thorough discussion of this condition and the management approach to it (including a comprehensive discussion of the known risks, side effects and potential benefits of treatment), the patient (family) agrees to implement the following specific plan:  • Benign and reassured    Assessment and Plan  A milium is a small cyst containing keratin (the skin protein); they are usually multiple and are then known as milia. These harmless cysts present as tiny pearly-white bumps just under the surface of the skin. Milia are common in all ages and both sexes. They most often arise on the face and are particularly prominent on the eyelids and cheeks, but they may occur elsewhere. There are various kinds of milia. •  milia: Affect 40-50% of  babies, few to numerous lesions, often seen on the nose, but may also arise inside the mouth on the mucosa (Greyson pearls) or palate (Loc nodules) or more widely on the scalp, face and upper trunk, Heal spontaneously within a few weeks of birth. • Primary milia in children and adults: found around eyelids, cheeks, forehead and genitalia, in young children, a row of milia may appear along the nasal crease, may clear in a few weeks or persist for months or longer.     • Juvenile milia: associated with Rombo syndrome, basal cell naevus syndrome, Xwkhk-Iuyjw-Nvligbvg syndrome, pachyonychia congenita, Britton syndrome and other genetic disorders, may be congenital (present at birth) or appear later in life. • Milia en plaque: multiple milia appear on within an inflamed plaque up to several centimeters in diameter, usually found on an eyelid, behind the ear, on a cheek or jaw. 3. Affect children and adults, especially middle-aged women. 4. Sometimes associated with another skin disease including pseudoxanthoma elasticum, discoid lupus erythematosus, lichen planus. • Multiple eruptive milia: crops of numerous milia appear over a few weeks to months, lesions may be asymptomatic or itchy, most often affect the face, upper arms and upper trunk. • Traumatic milia: occur at the site of injury as skin heals, arise from eccrine sweat ducts, examples include thermal burns, dermabrasion, blistering rashes such as bullous pemphigoid, often seen on the back of hands and fingers in porphyria cutanea tarda, a milia-like calcified nodule may develop after  heel stick blood test.   • Milia associated with drugs: may rarely follow the use of topical medication, such as phenols, hydroquinone, 5-fluorouracil cream, and a corticosteroid. Milia have a characteristic appearance. However, on occasion, a skin biopsy may be performed. This shows a small epidermoid cyst coming from a vellus hair follicle. Milia should be distinguished from other types of cyst, comedones, xanthelasma and syringomas. Colloid milia are treviño coloured bumps on cheeks and temples associated with excessive exposure to sunlight. They should also be distinguished from milia-like cysts noted on dermoscopy in seborrhoeic keratoses, papillomatous moles and some basal cell carcinomas. Milia do not need to be treated unless they are a cause for concern for the patient. They often clear up by themselves within a few months. Where possible, further trauma should be minimised to reduce the development of new lesions.   • The lesion may be de-roofed using a sterile needle or blade and the contents squeezed or pricked out.  • They may be destroyed using diathermy and curettage, or cryotherapy. • For widespread lesions, topical retinoids may be helpful. • Chemical peels, dermabrasion and laser ablation have been reported to be effective when used for very extensive milia. • Milia en plaque may improve with minocycline (a tetracycline antibiotic). NOTALGIA PARESTHETICA    Physical Exam:  • Anatomic Location Affected:  Mid Back  • Morphological Description: lichenified erythematous scaling patch  • Pertinent Positives:  • Pertinent Negatives: Additional History of Present Condition:  Present on exam    Assessment and Plan:  Based on a thorough discussion of this condition and the management approach to it (including a comprehensive discussion of the known risks, side effects and potential benefits of treatment), the patient (family) agrees to implement the following specific plan:  • Benign and reassured    Assessment and Plan:  Notalgia paresthetica is a condition where itch and changed sensation arise in the areas of skin on the medial aspect of the shoulder blade on either side of the back. Notalgia means pain in the back, and paraesthetica refers to burning pain, tingling or itch. It is also called thoracic cutaneous nerve entrapment syndrome. The nerves which supply sensation to the upper back emerge from the spinal cord (2nd to 6th thoracic segments) and run a long course up through the thick muscles of the back. They make a right-angled turn before reaching the skin. The nerves appear to be vulnerable to compression or traction. Partial compression or injury leads to the symptoms. Initial injury to the nerves may include:  • Back injury  • Herniated or "slipped" disc  • Herpes zoster (shingles)  • Sunburn  • Myelopathy (muscular disease)  • Small fiber neuropathy    Notalgia paresthetica often starts after a period of intense exercise leading to muscular stiffness, or a period of inactivity.  Sometimes, a specific injury may be recalled. It is characterized by intense itch on the medial border of one scapula or both, ie, between the shoulder blades. This itch can be intermittent or continuous. It is unrelieved by scratching, although the scratching and rubbing may be pleasurable. The affected area may spread to both shoulder blades and more widely over the back and shoulders. In many patients, there are no visible signs. Visible changes often arise from rubbing and scratching the affected area. These include:  • Scratch marks  • Hyperpigmentation (brown marks)  • Hypopigmentation (white marks)  • Lichen simplex (a type of eczema)  • Scarring    There may be changed sensation in the affected area of skin in response to light touch, sharp objects, cold, and heat. There may be reduced or absent sweating in the affected area. Radiology such as X-ray, CT scan or MRI may demonstrate a degenerative vertebra or prolapsed disc in the area that corresponds to the nerve supply to the affected skin. In many cases, no abnormality is revealed. Treatment is not always necessary, and it is not always successful.  Typical management may include the following:  • Cooling lotions or creams as required (camphor and menthol)  • Topical steroids to treat associated lichen simplex  • Capsaicin cream - this depletes nerve endings of their chemical transmitters but requires frequent reapplication and causes unpleasant local side effects  • Local anesthetic creams may provide temporary relief of symptoms  • Amitriptyline or other oral tricyclic medications at night to help sleep and counteract neuropathic symptoms  • Gabapentin, pregabalin or other anticonvulsants can relieve unpleasant burning or tingling sensations  • Transcutaneous electrical nerve stimulation (TENS)  • Surgical decompression of vertebral nerve impingement    Physical therapy with repetitive exercises and stretches for the upper back has been reported to be effective.     • While sitting, cross arms and bend forwards to stretch upper back  • Arms at sides, raise shoulders and rotate them forwards and backwards  • Arms straight, rotate forwards 360 degrees and backwards 360 degrees  • Rotate upper body left and right until stretch is felt and hold  • Massage the muscles besides the spine in the affected area      Scribe Attestation    I,:  Ochoa Sanabria am acting as a scribe while in the presence of the attending physician.:       I,:  Darby High MD personally performed the services described in this documentation    as scribed in my presence.:

## 2023-08-28 NOTE — TELEPHONE ENCOUNTER
Pt is new on medicare and aarp, is calling to find out what is covered for the office apt scheduled today, pt is looking for the cpt code - 20920 (est pt office visit) to see if office visit is fully covered under her insurance.

## 2023-08-28 NOTE — TELEPHONE ENCOUNTER
Patient calling asking if her full skin check scheduled today is considered medical necessity , patient has new insurance . Advised her to contact her insurance first and she also requested phone number from our billing department .

## 2023-09-15 ENCOUNTER — ANESTHESIA (OUTPATIENT)
Dept: GASTROENTEROLOGY | Facility: HOSPITAL | Age: 68
End: 2023-09-15

## 2023-09-15 ENCOUNTER — HOSPITAL ENCOUNTER (OUTPATIENT)
Dept: RADIOLOGY | Facility: HOSPITAL | Age: 68
Discharge: HOME | End: 2023-09-15
Payer: MEDICARE

## 2023-09-15 ENCOUNTER — ANESTHESIA EVENT (OUTPATIENT)
Dept: GASTROENTEROLOGY | Facility: HOSPITAL | Age: 68
End: 2023-09-15

## 2023-09-15 ENCOUNTER — HOSPITAL ENCOUNTER (OUTPATIENT)
Dept: GASTROENTEROLOGY | Facility: HOSPITAL | Age: 68
Setting detail: OUTPATIENT SURGERY
End: 2023-09-15
Attending: INTERNAL MEDICINE
Payer: MEDICARE

## 2023-09-15 VITALS
TEMPERATURE: 98.2 F | HEART RATE: 84 BPM | RESPIRATION RATE: 16 BRPM | SYSTOLIC BLOOD PRESSURE: 151 MMHG | DIASTOLIC BLOOD PRESSURE: 79 MMHG | OXYGEN SATURATION: 100 %

## 2023-09-15 DIAGNOSIS — D13.5 AMPULLARY ADENOMA: ICD-10-CM

## 2023-09-15 PROCEDURE — 74328 X-RAY BILE DUCT ENDOSCOPY: CPT

## 2023-09-15 PROCEDURE — C1889 IMPLANT/INSERT DEVICE, NOC: HCPCS

## 2023-09-15 PROCEDURE — C2617 STENT, NON-COR, TEM W/O DEL: HCPCS

## 2023-09-15 PROCEDURE — 88305 TISSUE EXAM BY PATHOLOGIST: CPT | Performed by: PATHOLOGY

## 2023-09-15 PROCEDURE — C1769 GUIDE WIRE: HCPCS

## 2023-09-15 RX ORDER — DEXAMETHASONE SODIUM PHOSPHATE 10 MG/ML
INJECTION, SOLUTION INTRAMUSCULAR; INTRAVENOUS AS NEEDED
Status: DISCONTINUED | OUTPATIENT
Start: 2023-09-15 | End: 2023-09-15

## 2023-09-15 RX ORDER — LIDOCAINE HYDROCHLORIDE 20 MG/ML
INJECTION, SOLUTION EPIDURAL; INFILTRATION; INTRACAUDAL; PERINEURAL AS NEEDED
Status: DISCONTINUED | OUTPATIENT
Start: 2023-09-15 | End: 2023-09-15

## 2023-09-15 RX ORDER — SODIUM CHLORIDE 9 MG/ML
INJECTION, SOLUTION INTRAVENOUS CONTINUOUS PRN
Status: DISCONTINUED | OUTPATIENT
Start: 2023-09-15 | End: 2023-09-15

## 2023-09-15 RX ORDER — ROCURONIUM BROMIDE 10 MG/ML
INJECTION, SOLUTION INTRAVENOUS AS NEEDED
Status: DISCONTINUED | OUTPATIENT
Start: 2023-09-15 | End: 2023-09-15

## 2023-09-15 RX ORDER — DIPHENHYDRAMINE HYDROCHLORIDE 50 MG/ML
INJECTION INTRAMUSCULAR; INTRAVENOUS AS NEEDED
Status: DISCONTINUED | OUTPATIENT
Start: 2023-09-15 | End: 2023-09-15

## 2023-09-15 RX ORDER — PROPOFOL 10 MG/ML
INJECTION, EMULSION INTRAVENOUS AS NEEDED
Status: DISCONTINUED | OUTPATIENT
Start: 2023-09-15 | End: 2023-09-15

## 2023-09-15 RX ORDER — ONDANSETRON 2 MG/ML
INJECTION INTRAMUSCULAR; INTRAVENOUS AS NEEDED
Status: DISCONTINUED | OUTPATIENT
Start: 2023-09-15 | End: 2023-09-15

## 2023-09-15 RX ADMIN — LIDOCAINE HYDROCHLORIDE 100 MG: 20 INJECTION, SOLUTION EPIDURAL; INFILTRATION; INTRACAUDAL; PERINEURAL at 14:19

## 2023-09-15 RX ADMIN — DEXAMETHASONE SODIUM PHOSPHATE 6 MG: 10 INJECTION, SOLUTION INTRAMUSCULAR; INTRAVENOUS at 14:25

## 2023-09-15 RX ADMIN — SODIUM CHLORIDE: 0.9 INJECTION, SOLUTION INTRAVENOUS at 14:16

## 2023-09-15 RX ADMIN — SUGAMMADEX 200 MG: 100 INJECTION, SOLUTION INTRAVENOUS at 15:12

## 2023-09-15 RX ADMIN — PROPOFOL 150 MG: 10 INJECTION, EMULSION INTRAVENOUS at 14:19

## 2023-09-15 RX ADMIN — ONDANSETRON 4 MG: 2 INJECTION INTRAMUSCULAR; INTRAVENOUS at 14:33

## 2023-09-15 RX ADMIN — DIPHENHYDRAMINE HYDROCHLORIDE 12.5 MG: 50 INJECTION, SOLUTION INTRAMUSCULAR; INTRAVENOUS at 15:32

## 2023-09-15 RX ADMIN — IOHEXOL 2 ML: 240 INJECTION, SOLUTION INTRATHECAL; INTRAVASCULAR; INTRAVENOUS; ORAL at 15:00

## 2023-09-15 RX ADMIN — ROCURONIUM BROMIDE 25 MG: 10 INJECTION, SOLUTION INTRAVENOUS at 14:20

## 2023-09-15 NOTE — ANESTHESIA PREPROCEDURE EVALUATION
Procedure:  ERCP    Relevant Problems   ANESTHESIA (within normal limits)      CARDIO   (+) Pure hypercholesterolemia      ENDO (within normal limits)      GI/HEPATIC (within normal limits)  NPO confirmed  BMI 21.8      /RENAL (within normal limits)      HEMATOLOGY (within normal limits)      MUSCULOSKELETAL   (+) Primary osteoarthritis      PULMONARY (within normal limits)   (-) URI (upper respiratory infection)      Other   (+) Elevated liver function tests      Ampullary adenoma  No Known Allergies  Social History     Tobacco Use   • Smoking status: Former     Packs/day: 0.25     Years: 33.00     Total pack years: 8.25     Types: Cigarettes     Start date: 3/17/1971     Quit date: 3/17/2004     Years since quittin.5   • Smokeless tobacco: Never   • Tobacco comments:     quit    Vaping Use   • Vaping Use: Never used   Substance Use Topics   • Alcohol use: Yes     Alcohol/week: 14.0 standard drinks of alcohol     Types: 14 Cans of beer per week     Comment: 2 per day. I filled this info out for Dr Shannon Bingham!    • Drug use: Never     Current Outpatient Medications   Medication Instructions   • calcium carbonate (OS-CARTER) 600 mg, Oral, 2 times daily with meals   • Cholecalciferol (VITAMIN D3) 1000 units CAPS Oral   • COLLAGEN PO 5,000 mcg, Oral, Daily   • folic acid (FOLVITE) 1 mg tablet Oral, Daily     Lab Results   Component Value Date    WBC 7.14 2022    HGB 13.7 2022    HCT 43.8 2022     2022    SODIUM 137 2022    K 3.8 2022     2022    CO2 28 2022    BUN 22 2022    CREATININE 0.86 2022    GLUC 82 2021    AST 24 2022    ALT 26 2022    ALKPHOS 42 (L) 2022    TBILI 0.81 2022    ALB 3.9 2022     Vitals:    09/15/23 1354   BP: 141/84   Pulse: 81   Resp: 18   Temp: (!) 96.7 °F (35.9 °C)   SpO2: 96%       Physical Exam    Airway    Mallampati score: II  TM Distance: >3 FB  Neck ROM: full Dental   Comment: Denies loose teeth,     Cardiovascular  Rhythm: regular, Rate: normal,     Pulmonary  Breath sounds clear to auscultation,     Other Findings        Anesthesia Plan  ASA Score- 2     Anesthesia Type- general with ASA Monitors. Additional Monitors:   Airway Plan: ETT. Plan Factors-Exercise tolerance (METS): >4 METS. Chart reviewed. Existing labs reviewed. Patient summary reviewed. Patient is not a current smoker. Induction- intravenous. Postoperative Plan-     Informed Consent- Anesthetic plan and risks discussed with patient. I personally reviewed this patient with the CRNA. Discussed and agreed on the Anesthesia Plan with the CRNA. Jesus Martinez

## 2023-09-15 NOTE — ANESTHESIA POSTPROCEDURE EVALUATION
Post-Op Assessment Note    CV Status:  Stable    Pain management: adequate     Mental Status:  Awake and sleepy   Hydration Status:  Euvolemic   PONV Controlled:  Controlled   Airway Patency:  Patent  Airway: intubated      Post Op Vitals Reviewed: Yes      Staff: CRNA         No notable events documented.     /82 (09/15/23 1526)    Temp (!) 96 °F (35.6 °C) (09/15/23 1526)    Pulse 85 (09/15/23 1526)   Resp 16 (09/15/23 1526)    SpO2 97 % (09/15/23 1526)

## 2023-09-20 PROCEDURE — 88305 TISSUE EXAM BY PATHOLOGIST: CPT | Performed by: PATHOLOGY

## 2023-09-21 ENCOUNTER — PREP FOR PROCEDURE (OUTPATIENT)
Dept: GASTROENTEROLOGY | Facility: CLINIC | Age: 68
End: 2023-09-21

## 2023-09-21 DIAGNOSIS — D13.5 AMPULLARY ADENOMA: Primary | ICD-10-CM

## 2023-09-26 ENCOUNTER — TELEPHONE (OUTPATIENT)
Dept: GASTROENTEROLOGY | Facility: CLINIC | Age: 68
End: 2023-09-26

## 2023-09-26 NOTE — TELEPHONE ENCOUNTER
Scheduled date of ERCP(as of today): 12/13/23  Physician performing ERCP:   Location of ERCP: [facility]  Instructions reviewed with patient by: Daysi/denae  Clearances: N/A

## 2023-09-26 NOTE — TELEPHONE ENCOUNTER
----- Message from Swati Hess MD sent at 9/21/2023 10:16 AM EDT -----  Schedule ercp for December with me. Orders placed. Thanks.

## 2023-09-28 ENCOUNTER — TELEPHONE (OUTPATIENT)
Dept: GASTROENTEROLOGY | Facility: CLINIC | Age: 68
End: 2023-09-28

## 2023-09-28 DIAGNOSIS — R10.9 RIGHT SIDED ABDOMINAL PAIN: ICD-10-CM

## 2023-09-28 DIAGNOSIS — D13.5 AMPULLARY ADENOMA: Primary | ICD-10-CM

## 2023-09-28 NOTE — TELEPHONE ENCOUNTER
Ordered RUQ US and cbc/cmp to evaluate pain and discomfort.    ----- Message from Leesa Huddleston sent at 9/27/2023  8:50 AM EDT -----  Regarding: FW: Good morning Dr Mohamud Hein: [phone]    ----- Message -----  From: Chanda Doll  Sent: 9/27/2023   8:30 AM EDT  To: [facility]  Subject: Good morning Dr Connor Anila                            Not sure if it's from the procedure but it will be 2 weeks this Friday since I had it and I still have a little something funky going on. I definitely think I've had a virus going around so I don't know if it has to do with that, but I still have what I call maybe a small gas bubble pain under my right rib the past few nights that wakes me up sometimes but doesn't last more than a few minutes. It's a faint pain but still a pain. I'm at work now and don't feel anything. Just want you to be aware in case you think it's something to be checked. You're thoughts?     Have a nice day :)

## 2023-10-06 ENCOUNTER — HOSPITAL ENCOUNTER (OUTPATIENT)
Dept: ULTRASOUND IMAGING | Facility: CLINIC | Age: 68
Discharge: HOME | End: 2023-10-06
Payer: MEDICARE

## 2023-10-06 DIAGNOSIS — R10.9 RIGHT SIDED ABDOMINAL PAIN: ICD-10-CM

## 2023-10-06 PROCEDURE — 76705 ECHO EXAM OF ABDOMEN: CPT

## 2023-10-12 ENCOUNTER — TELEPHONE (OUTPATIENT)
Dept: OBGYN CLINIC | Facility: CLINIC | Age: 68
End: 2023-10-12

## 2023-10-12 NOTE — TELEPHONE ENCOUNTER
Pt is concerned about her imaging orders placed in her chart. When patient went to schedule her appointments, the  was questioning why a Diagnostic Mammogram is needed on both breasts. She has only had issues with the right one. Pt has concerns about having testing done and receiving an astronomical bill. Please advise.

## 2023-10-12 NOTE — TELEPHONE ENCOUNTER
Pt called with questions regarding her diagnostic bilateral mammo. Pt is concerned with insurance coverage.- reviewed most recent recommendation of 5/26/2023 , which is to have a diag bilateral mammo with 3D & CAD this October & a right breast US. .  per pt's request, a copy of that mammo (5/2023) was emailed to pt.

## 2023-10-19 NOTE — TELEPHONE ENCOUNTER
Called pt- pt is going to keep original Rx, ordered by radiologist,  , recommendation for bilateral diagnostic mammogram.

## 2023-10-19 NOTE — TELEPHONE ENCOUNTER
Message  Received: 3 days ago  CHLOÉ Steiner, RN  Caller: Unspecified (1 week ago,  4:26 PM)  The radiologist recommended bilateral diagnostic mammogram but if she would like I can change it to just a right breast diagnostic mammogram.        Called pt- , per communication consent, LEFT MESSAGE informing of AL response. Advised pt call back, if desires order to be changed to just a right breast diag. Mammo.

## 2023-11-29 ENCOUNTER — HOSPITAL ENCOUNTER (OUTPATIENT)
Dept: MAMMOGRAPHY | Facility: CLINIC | Age: 68
Discharge: HOME | End: 2023-11-29
Payer: MEDICARE

## 2023-11-29 ENCOUNTER — HOSPITAL ENCOUNTER (OUTPATIENT)
Dept: ULTRASOUND IMAGING | Facility: CLINIC | Age: 68
Discharge: HOME | End: 2023-11-29
Payer: MEDICARE

## 2023-11-29 VITALS — HEIGHT: 63 IN | BODY MASS INDEX: 21.79 KG/M2 | WEIGHT: 123 LBS

## 2023-11-29 DIAGNOSIS — N60.09 COMPLEX CYST OF BREAST: ICD-10-CM

## 2023-11-29 PROCEDURE — 76642 ULTRASOUND BREAST LIMITED: CPT

## 2023-11-29 PROCEDURE — 77066 DX MAMMO INCL CAD BI: CPT

## 2023-11-29 PROCEDURE — G0279 TOMOSYNTHESIS, MAMMO: HCPCS

## 2023-11-29 NOTE — PROGRESS NOTES
Met with patient and Dr. Stanley Prayer  regarding recommendation for;    __X___ RIGHT ______LEFT      __X___Ultrasound guided  ______Stereotactic breast biopsy. __X___Verbalized understanding.       Blood thinners:  No: __X___ Yes: ______ What:                 Biopsy teaching sheet given:  Yes: ___X___ No: ________    Pt given contact information and adv to call with any questions/needs    Patient advised to arrive at 0900 for a 0930 appointment

## 2023-12-02 ENCOUNTER — HOSPITAL ENCOUNTER (OUTPATIENT)
Dept: MAMMOGRAPHY | Facility: CLINIC | Age: 68
Discharge: HOME | End: 2023-12-02

## 2023-12-02 ENCOUNTER — HOSPITAL ENCOUNTER (OUTPATIENT)
Dept: ULTRASOUND IMAGING | Facility: CLINIC | Age: 68
Discharge: HOME | End: 2023-12-02
Payer: MEDICARE

## 2023-12-02 VITALS — SYSTOLIC BLOOD PRESSURE: 136 MMHG | DIASTOLIC BLOOD PRESSURE: 88 MMHG | HEART RATE: 77 BPM

## 2023-12-02 DIAGNOSIS — R92.8 ABNORMAL MAMMOGRAM: ICD-10-CM

## 2023-12-02 PROCEDURE — 19083 BX BREAST 1ST LESION US IMAG: CPT

## 2023-12-02 PROCEDURE — A4648 IMPLANTABLE TISSUE MARKER: HCPCS

## 2023-12-02 PROCEDURE — 88305 TISSUE EXAM BY PATHOLOGIST: CPT | Performed by: STUDENT IN AN ORGANIZED HEALTH CARE EDUCATION/TRAINING PROGRAM

## 2023-12-02 RX ORDER — LIDOCAINE HYDROCHLORIDE 10 MG/ML
5 INJECTION, SOLUTION EPIDURAL; INFILTRATION; INTRACAUDAL; PERINEURAL ONCE
Status: COMPLETED | OUTPATIENT
Start: 2023-12-02 | End: 2023-12-02

## 2023-12-02 RX ADMIN — LIDOCAINE HYDROCHLORIDE 5 ML: 10 INJECTION, SOLUTION EPIDURAL; INFILTRATION; INTRACAUDAL; PERINEURAL at 09:28

## 2023-12-02 NOTE — PROGRESS NOTES
Ice pack given:    __X___yes _____no    Discharge instructions reviewed and given to patient:    __X___yes _____no    Discharged via:    __X___amulatory    _____wheelchair    _____stretcher    Stable on discharge:    __X___yes ____no  Patient made aware of potential bloody nipple discharge. Patient would like results over the phone. Ok to leave a message.

## 2023-12-04 NOTE — PROGRESS NOTES
Post procedure call completed    Bleeding: _____yes __X___no, pt denies    Pain: _____yes ___X___no, pt denies    Redness/Swelling: ______yes ___X___no, pt denies    Band aid removed: __X___yes ____no     Steri-Strips intact: ___X___yes _____no (discussed with patient to remove steri strips on 12/6 if they have not come off on their own)    Pt with no questions at this time, adv will call when results available, adv to call with any questions or concerns, has name/# for contact

## 2023-12-05 PROCEDURE — 88305 TISSUE EXAM BY PATHOLOGIST: CPT | Performed by: STUDENT IN AN ORGANIZED HEALTH CARE EDUCATION/TRAINING PROGRAM

## 2023-12-06 ENCOUNTER — TELEPHONE (OUTPATIENT)
Dept: MAMMOGRAPHY | Facility: CLINIC | Age: 68
End: 2023-12-06

## 2023-12-06 NOTE — TELEPHONE ENCOUNTER
Call placed to patient and she was given breast biopsy results, questions answered, adv next step is to set patient up with surgeon, options discussed and pt would like to have appt made with Dr. Jazmyn Godoy, adv patient that  would call her back by tomorrow with appt, pt states understanding, pt with no questions at this time, has name/# for any further needs

## 2023-12-06 NOTE — TELEPHONE ENCOUNTER
[facility] Ref[facility]    Diagnosis: intraductal papilloma    Is this diagnosis cancer (Y/N): no  (Nurses: If yes, this should be sent to [facility] first)    Biopsy Date: 12/2/2023    Does the patient have another biopsy pending: no  If so, when:    Preferred provider: Dr. Delfin Cowden    Preferred location:[place]    Any requests for dates/times: first available    Any additional information:     Please advise when appointment is made

## 2023-12-07 PROBLEM — N64.89 PSEUDOANGIOMATOUS STROMAL HYPERPLASIA OF BREAST: Status: ACTIVE | Noted: 2023-12-07

## 2023-12-07 PROBLEM — D24.1 INTRADUCTAL PAPILLOMA OF BREAST, RIGHT: Status: ACTIVE | Noted: 2023-12-07

## 2023-12-08 ENCOUNTER — CONSULT (OUTPATIENT)
Dept: SURGICAL ONCOLOGY | Facility: CLINIC | Age: 68
End: 2023-12-08
Payer: MEDICARE

## 2023-12-08 ENCOUNTER — OFFICE VISIT (OUTPATIENT)
Dept: LAB | Facility: HOSPITAL | Age: 68
End: 2023-12-08
Attending: SURGERY
Payer: MEDICARE

## 2023-12-08 ENCOUNTER — APPOINTMENT (OUTPATIENT)
Dept: LAB | Facility: HOSPITAL | Age: 68
End: 2023-12-08
Payer: MEDICARE

## 2023-12-08 VITALS
TEMPERATURE: 97.7 F | BODY MASS INDEX: 21.88 KG/M2 | WEIGHT: 123.5 LBS | HEART RATE: 77 BPM | DIASTOLIC BLOOD PRESSURE: 76 MMHG | SYSTOLIC BLOOD PRESSURE: 122 MMHG | OXYGEN SATURATION: 98 % | HEIGHT: 63 IN | RESPIRATION RATE: 15 BRPM

## 2023-12-08 DIAGNOSIS — D24.1 INTRADUCTAL PAPILLOMA OF BREAST, RIGHT: ICD-10-CM

## 2023-12-08 DIAGNOSIS — Z01.818 PRE-OP EXAMINATION: ICD-10-CM

## 2023-12-08 DIAGNOSIS — R79.9 ABNORMAL FINDING OF BLOOD CHEMISTRY, UNSPECIFIED: ICD-10-CM

## 2023-12-08 DIAGNOSIS — N64.89 PSEUDOANGIOMATOUS STROMAL HYPERPLASIA OF BREAST: ICD-10-CM

## 2023-12-08 LAB
ALBUMIN SERPL BCP-MCNC: 4.5 G/DL (ref 3.5–5)
ALP SERPL-CCNC: 47 U/L (ref 34–104)
ALT SERPL W P-5'-P-CCNC: 17 U/L (ref 7–52)
ANION GAP SERPL CALCULATED.3IONS-SCNC: 7 MMOL/L
AST SERPL W P-5'-P-CCNC: 25 U/L (ref 13–39)
ATRIAL RATE: 71 BPM
BASOPHILS # BLD AUTO: 0.04 THOUSANDS/ÂΜL (ref 0–0.1)
BASOPHILS NFR BLD AUTO: 1 % (ref 0–1)
BILIRUB SERPL-MCNC: 0.6 MG/DL (ref 0.2–1)
BUN SERPL-MCNC: 15 MG/DL (ref 5–25)
CALCIUM SERPL-MCNC: 10.2 MG/DL (ref 8.4–10.2)
CHLORIDE SERPL-SCNC: 104 MMOL/L (ref 96–108)
CO2 SERPL-SCNC: 28 MMOL/L (ref 21–32)
CREAT SERPL-MCNC: 0.88 MG/DL (ref 0.6–1.3)
DME PARACHUTE DELIVERY DATE REQUESTED: NORMAL
DME PARACHUTE ITEM DESCRIPTION: NORMAL
DME PARACHUTE ORDER STATUS: NORMAL
DME PARACHUTE SUPPLIER NAME: NORMAL
DME PARACHUTE SUPPLIER PHONE: NORMAL
EOSINOPHIL # BLD AUTO: 0.2 THOUSAND/ÂΜL (ref 0–0.61)
EOSINOPHIL NFR BLD AUTO: 2 % (ref 0–6)
ERYTHROCYTE [DISTWIDTH] IN BLOOD BY AUTOMATED COUNT: 13.4 % (ref 11.6–15.1)
GFR SERPL CREATININE-BSD FRML MDRD: 67 ML/MIN/1.73SQ M
GLUCOSE SERPL-MCNC: 91 MG/DL (ref 65–140)
HCT VFR BLD AUTO: 41.5 % (ref 34.8–46.1)
HGB BLD-MCNC: 13.6 G/DL (ref 11.5–15.4)
IMM GRANULOCYTES # BLD AUTO: 0.02 THOUSAND/UL (ref 0–0.2)
IMM GRANULOCYTES NFR BLD AUTO: 0 % (ref 0–2)
LYMPHOCYTES # BLD AUTO: 2.11 THOUSANDS/ÂΜL (ref 0.6–4.47)
LYMPHOCYTES NFR BLD AUTO: 25 % (ref 14–44)
MCH RBC QN AUTO: 29.1 PG (ref 26.8–34.3)
MCHC RBC AUTO-ENTMCNC: 32.8 G/DL (ref 31.4–37.4)
MCV RBC AUTO: 89 FL (ref 82–98)
MONOCYTES # BLD AUTO: 0.7 THOUSAND/ÂΜL (ref 0.17–1.22)
MONOCYTES NFR BLD AUTO: 8 % (ref 4–12)
NEUTROPHILS # BLD AUTO: 5.29 THOUSANDS/ÂΜL (ref 1.85–7.62)
NEUTS SEG NFR BLD AUTO: 64 % (ref 43–75)
NRBC BLD AUTO-RTO: 0 /100 WBCS
P AXIS: 70 DEGREES
PLATELET # BLD AUTO: 303 THOUSANDS/UL (ref 149–390)
PMV BLD AUTO: 9.7 FL (ref 8.9–12.7)
POTASSIUM SERPL-SCNC: 5 MMOL/L (ref 3.5–5.3)
PR INTERVAL: 132 MS
PROT SERPL-MCNC: 7.8 G/DL (ref 6.4–8.4)
QRS AXIS: 42 DEGREES
QRSD INTERVAL: 68 MS
QT INTERVAL: 394 MS
QTC INTERVAL: 428 MS
RBC # BLD AUTO: 4.67 MILLION/UL (ref 3.81–5.12)
SODIUM SERPL-SCNC: 139 MMOL/L (ref 135–147)
T WAVE AXIS: 56 DEGREES
VENTRICULAR RATE: 71 BPM
WBC # BLD AUTO: 8.36 THOUSAND/UL (ref 4.31–10.16)

## 2023-12-08 PROCEDURE — 36415 COLL VENOUS BLD VENIPUNCTURE: CPT

## 2023-12-08 PROCEDURE — 85025 COMPLETE CBC W/AUTO DIFF WBC: CPT

## 2023-12-08 PROCEDURE — 99205 OFFICE O/P NEW HI 60 MIN: CPT | Performed by: SURGERY

## 2023-12-08 PROCEDURE — 80053 COMPREHEN METABOLIC PANEL: CPT

## 2023-12-08 PROCEDURE — 93005 ELECTROCARDIOGRAM TRACING: CPT

## 2023-12-08 RX ORDER — CEFAZOLIN SODIUM 1 G/50ML
1000 SOLUTION INTRAVENOUS ONCE
OUTPATIENT
Start: 2023-12-08 | End: 2023-12-08

## 2023-12-08 RX ORDER — OXYCODONE HYDROCHLORIDE AND ACETAMINOPHEN 5; 325 MG/1; MG/1
1 TABLET ORAL EVERY 6 HOURS PRN
Qty: 20 TABLET | Refills: 0 | Status: SHIPPED | OUTPATIENT
Start: 2023-12-08

## 2023-12-08 NOTE — PROGRESS NOTES
Surgical Oncology Consult Note       [facility]  [facility] Мария Mejia Dony [facility] [address]  1955  [id number]      Chief Complaint   Patient presents with    Consult        Assessment/Plan    1. Intraductal papilloma of breast, right    2. Pseudoangiomatous stromal hyperplasia of breast    3. Pre-op examination         Oncology History    No history exists. This is a 65-year-old very pleasant female with family history of breast cancer had abnormal mammogram followed by ultrasound right breast followed by biopsy biopsy is consistent with intraductal papilloma and referred to us for surgical excision by [facility] breast center. She denies of breast pain nipple discharge nipple retraction or skin changes. Review of Systems   Constitutional:  Negative for chills and fever. HENT:  Negative for ear pain and sore throat. Eyes:  Negative for pain and visual disturbance. Respiratory:  Negative for cough and shortness of breath. Cardiovascular:  Negative for chest pain and palpitations. Gastrointestinal:  Negative for abdominal pain and vomiting. Genitourinary:  Negative for dysuria and hematuria. Musculoskeletal:  Negative for arthralgias and back pain. Skin:  Negative for color change and rash. Neurological:  Negative for seizures and syncope. All other systems reviewed and are negative.        Past Medical History:      Patient Active Problem List   Diagnosis    Other microscopic hematuria    Bile duct abnormality    Tubular adenoma of colon    Seborrheic keratosis    Pure hypercholesterolemia    Osteoporosis without current pathological fracture    Primary osteoarthritis    Colon polyp    Elevated liver function tests    Asymptomatic postmenopausal status    Atrophic vaginitis    Pseudoangiomatous stromal hyperplasia of breast    Intraductal papilloma of breast, right        Past Medical History:   Diagnosis Date    Colon polyp     Medical history reviewed with no changes     Osteoporosis     Thyroglossal cyst         Past Surgical History:   Procedure Laterality Date    BREAST BIOPSY Left 2018    benign    CATARACT EXTRACTION      COLONOSCOPY      2017    CYSTOSCOPY  2021    Dr. Sakina Coleman      EGD  2022    THROAT SURGERY      US GUIDED BREAST BIOPSY RIGHT COMPLETE Right 2023        Family History   Problem Relation Age of Onset    Dementia Mother     Diabetes Father     No Known Problems Maternal Aunt     No Known Problems Maternal Aunt     No Known Problems Maternal Aunt     Breast cancer Cousin 39    Breast cancer Cousin 39    Colon cancer Neg Hx     Ovarian cancer Neg Hx     Uterine cancer Neg Hx     Cervical cancer Neg Hx         Social History     Socioeconomic History    Marital status:      Spouse name: Not on file    Number of children: Not on file    Years of education: Not on file    Highest education level: Not on file   Occupational History    Not on file   Tobacco Use    Smoking status: Former     Packs/day: 0.25     Years: 33.00     Total pack years: 8.25     Types: Cigarettes     Start date: 3/17/1971     Quit date: 3/17/2004     Years since quittin.7    Smokeless tobacco: Never    Tobacco comments:     quit    Vaping Use    Vaping Use: Never used   Substance and Sexual Activity    Alcohol use: Yes     Alcohol/week: 14.0 standard drinks of alcohol     Types: 14 Cans of beer per week     Comment: 2 per day. I filled this info out for Dr Norah Waite!     Drug use: Never    Sexual activity: Not Currently     Partners: Female     Birth control/protection: Post-menopausal   Other Topics Concern    Not on file   Social History Narrative    Not on file     Social Determinants of Health     Financial Resource Strain: Not on file   Food Insecurity: Not on file   Transportation Needs: Not on file   Physical Activity: Not on file   Stress: No Stress Concern Present (12/23/2022)    [facility]     Feeling of Stress : Not at all   Social Connections: Not on file   Intimate Partner Violence: Not on file   Housing Stability: Not on file        Current Outpatient Medications:     calcium carbonate (OS-CARTER) 600 MG tablet, Take 600 mg by mouth 2 (two) times a day with meals, Disp: , Rfl:     Cholecalciferol (VITAMIN D3) 1000 units CAPS, Take by mouth, Disp: , Rfl:     COLLAGEN PO, Take 5,000 mcg by mouth daily, Disp: , Rfl:     folic acid (FOLVITE) 1 mg tablet, Take by mouth daily, Disp: , Rfl:    No Known Allergies    Physical Exam:     Vitals:    12/08/23 1124   BP: 122/76   Pulse: 77   Resp: 15   Temp: 97.7 °F (36.5 °C)   SpO2: 98%     Physical Exam  Constitutional:       Appearance: Normal appearance. HENT:      Head: Normocephalic and atraumatic. Nose: Nose normal.      Mouth/Throat:      Mouth: Mucous membranes are moist.   Eyes:      Pupils: Pupils are equal, round, and reactive to light. Cardiovascular:      Rate and Rhythm: Normal rate. Pulses: Normal pulses. Heart sounds: Normal heart sounds. Pulmonary:      Effort: Pulmonary effort is normal.      Breath sounds: Normal breath sounds. Chest:      Comments: Bilateral breast examination ; no palpable mass masses nipple discharge nipple retraction or skin changes other than right breast mild bruise from recent biopsy. Bilateral axillary and supraclavicular examination no palpable adenopathy. Patient was examined seated and supine position  Abdominal:      General: Bowel sounds are normal.      Palpations: Abdomen is soft. Musculoskeletal:         General: Normal range of motion. Cervical back: Normal range of motion and neck supple. Skin:     General: Skin is warm. Neurological:      General: No focal deficit present.       Mental Status: She is alert and oriented to person, place, and time.   Psychiatric:         Mood and Affect: Mood normal.         Behavior: Behavior normal.         Thought Content: Thought content normal.         Judgment: Judgment normal.         Results:   Right breast, 7:00, 3-cm from nipple, ultrasound-guided core biopsy:  - Intraductal papilloma. - Usual ductal hyperplasia. - Columnar cell change. - Pseudoangiomatous stromal hyperplasia    Bilateral diagnostic mammogram:  Narrative & Impression   DIAGNOSIS: Complex cyst of breast      TECHNIQUE:   Digital diagnostic mammography was performed. Computer Aided Detection (CAD) analyzed all applicable images. Right breast ultrasound was performed. COMPARISONS: Multiple prior exams dated between 2/18/2013 and 5/16/2023. RELEVANT HISTORY:   Family Breast Cancer History: History of breast cancer in [facility]. Family Medical History: Family medical history includes breast cancer in 2 relatives (cousin, cousin). Personal History: No known relevant hormone history. Surgical history includes breast biopsy. No known relevant medical history. RISK ASSESSMENT:   5 Year Tyrer-Cuzick: 0.61 %  10 Year Tyrer-Cuzick: 1.28 %  Lifetime Tyrer-Cuzick: 2.19 %     TISSUE DENSITY:   There are scattered areas of fibroglandular density. INDICATION: Delphine Schwartz is a 70 y.o. female presenting for follow-up. FINDINGS:   RIGHT  1) ASYMMETRY [A]  Mammo diagnostic bilateral w 3d & cad: There is an asymmetry seen in the central region of the right breast in the posterior depth. Compared to the previous study, there are no significant changes. US breast right limited (diagnostic): There is a 4 mm oval, hypoechoic mass with circumscribed margins seen in the central region of the right breast at 7 o'clock in the posterior depth, 3 cm from the nipple. Compared to the previous study, the mass increased in size.       Left  Mammo diagnostic bilateral w 3d & cad  There are no suspicious masses, grouped microcalcifications or areas of unexplained architectural distortion. The skin and nipple areolar complex are unremarkable. Biopsy marker clip is noted. A few diffusely distributed calcifications are noted in each breast.     IMPRESSION:  Asymmetry in the central posterior right breast is stable. Continued follow-up is recommended with bilateral diagnostic mammogram in 1 year. Hypoechoic mass in the 7 o'clock position of the right breast, 3 cm from nipple has increased in size. Right ultrasound-guided core needle biopsy is recommended. The findings and recommendations were discussed with the patient at the time of this exam.     ASSESSMENT/BI-RADS CATEGORY:  Left: 2 - Benign  Right: 4 - Suspicious  Overall: 4 - Suspicious     RECOMMENDATION:       - Ultrasound-guided breast biopsy for the right breast.       - Routine screening mammogram in 1 year for the left breast.       Discussion/Summary:   Here we have a very pleasant 69-year-old female with abnormal mammogram and ultrasound followed by biopsy intraductal papilloma. We did discuss in detail the benign nature of the intraductal papilloma all options were discussed including observation. After extensive discussion surgical consent was obtained for [facility] directed lumpectomy after explained the benefit procedure alternatives and possible complications. We will arrange for surgery. All patient's questions answered to her satisfaction. .  I did spend more than 60 minutes of which greater than 50% direct patient examination, consultation. Advance Care Planning/Advance Directives: Darrell Bravo MD discussed the disease status, and treatment plans with Sly Vivas today 12/08/23 and will follow-up with the patient.

## 2023-12-08 NOTE — PROGRESS NOTES
Call placed to patient regarding recommendation for;    __X___ RIGHT ______LEFT      __X___SAVI  placement.    Procedure explained to patient, additional questions answered at this time    __X___Verbalized understanding.      Blood thinners:  _____yes __X___no    Date stopped: ___N/A____    All teaching points discussed during call, pt with no questions at this time, pt adv to arrive at 1330 for 1400 insertion    Pt given name/# for any further questions/needs

## 2023-12-11 LAB
DME PARACHUTE DELIVERY DATE REQUESTED: NORMAL
DME PARACHUTE ITEM DESCRIPTION: NORMAL
DME PARACHUTE ORDER STATUS: NORMAL
DME PARACHUTE SUPPLIER NAME: NORMAL
DME PARACHUTE SUPPLIER PHONE: NORMAL

## 2023-12-12 ENCOUNTER — TELEPHONE (OUTPATIENT)
Dept: GASTROENTEROLOGY | Facility: HOSPITAL | Age: 68
End: 2023-12-12

## 2023-12-12 NOTE — TELEPHONE ENCOUNTER
Patients GI provider:  Dr. Amilcar Cortes    Number to return call: [phone]    Reason for call: Pt calling to see if time available for ERCP. Transferred to [address] [place] [address] at [address] [place] [address] for further assistance.     Scheduled procedure/appointment date if applicable: Procedure 96/35/6008

## 2023-12-13 ENCOUNTER — HOSPITAL ENCOUNTER (OUTPATIENT)
Dept: RADIOLOGY | Facility: HOSPITAL | Age: 68
Discharge: HOME | End: 2023-12-13
Payer: MEDICARE

## 2023-12-13 ENCOUNTER — ANESTHESIA (OUTPATIENT)
Dept: GASTROENTEROLOGY | Facility: HOSPITAL | Age: 68
End: 2023-12-13

## 2023-12-13 ENCOUNTER — HOSPITAL ENCOUNTER (OUTPATIENT)
Dept: GASTROENTEROLOGY | Facility: HOSPITAL | Age: 68
Setting detail: OUTPATIENT SURGERY
Discharge: HOME | End: 2023-12-13
Attending: INTERNAL MEDICINE
Payer: MEDICARE

## 2023-12-13 ENCOUNTER — ANESTHESIA EVENT (OUTPATIENT)
Dept: GASTROENTEROLOGY | Facility: HOSPITAL | Age: 68
End: 2023-12-13

## 2023-12-13 VITALS
SYSTOLIC BLOOD PRESSURE: 147 MMHG | WEIGHT: 122 LBS | OXYGEN SATURATION: 98 % | BODY MASS INDEX: 21.62 KG/M2 | HEIGHT: 63 IN | TEMPERATURE: 96.8 F | DIASTOLIC BLOOD PRESSURE: 75 MMHG | HEART RATE: 81 BPM | RESPIRATION RATE: 16 BRPM

## 2023-12-13 DIAGNOSIS — D13.5 AMPULLARY ADENOMA: ICD-10-CM

## 2023-12-13 PROCEDURE — 88305 TISSUE EXAM BY PATHOLOGIST: CPT | Performed by: PATHOLOGY

## 2023-12-13 PROCEDURE — 43276 ERCP STENT EXCHANGE W/DILATE: CPT | Performed by: INTERNAL MEDICINE

## 2023-12-13 PROCEDURE — 43273 ENDOSCOPIC PANCREATOSCOPY: CPT | Performed by: INTERNAL MEDICINE

## 2023-12-13 PROCEDURE — 43278 ERCP LESION ABLATE W/DILATE: CPT | Performed by: INTERNAL MEDICINE

## 2023-12-13 PROCEDURE — 43264 ERCP REMOVE DUCT CALCULI: CPT | Performed by: INTERNAL MEDICINE

## 2023-12-13 PROCEDURE — C1889 IMPLANT/INSERT DEVICE, NOC: HCPCS

## 2023-12-13 PROCEDURE — C2617 STENT, NON-COR, TEM W/O DEL: HCPCS

## 2023-12-13 PROCEDURE — 74328 X-RAY BILE DUCT ENDOSCOPY: CPT

## 2023-12-13 PROCEDURE — C1769 GUIDE WIRE: HCPCS

## 2023-12-13 PROCEDURE — C1874 STENT, COATED/COV W/DEL SYS: HCPCS

## 2023-12-13 RX ORDER — SUCCINYLCHOLINE/SOD CL,ISO/PF 100 MG/5ML
SYRINGE (ML) INTRAVENOUS AS NEEDED
Status: DISCONTINUED | OUTPATIENT
Start: 2023-12-13 | End: 2023-12-13

## 2023-12-13 RX ORDER — ONDANSETRON 2 MG/ML
4 INJECTION INTRAMUSCULAR; INTRAVENOUS ONCE AS NEEDED
Status: CANCELLED | OUTPATIENT
Start: 2023-12-13

## 2023-12-13 RX ORDER — LIDOCAINE HYDROCHLORIDE 10 MG/ML
INJECTION, SOLUTION EPIDURAL; INFILTRATION; INTRACAUDAL; PERINEURAL AS NEEDED
Status: DISCONTINUED | OUTPATIENT
Start: 2023-12-13 | End: 2023-12-13

## 2023-12-13 RX ORDER — PROPOFOL 10 MG/ML
INJECTION, EMULSION INTRAVENOUS AS NEEDED
Status: DISCONTINUED | OUTPATIENT
Start: 2023-12-13 | End: 2023-12-13

## 2023-12-13 RX ORDER — SODIUM CHLORIDE, SODIUM LACTATE, POTASSIUM CHLORIDE, CALCIUM CHLORIDE 600; 310; 30; 20 MG/100ML; MG/100ML; MG/100ML; MG/100ML
INJECTION, SOLUTION INTRAVENOUS CONTINUOUS PRN
Status: DISCONTINUED | OUTPATIENT
Start: 2023-12-13 | End: 2023-12-13

## 2023-12-13 RX ORDER — FENTANYL CITRATE 50 UG/ML
INJECTION, SOLUTION INTRAMUSCULAR; INTRAVENOUS AS NEEDED
Status: DISCONTINUED | OUTPATIENT
Start: 2023-12-13 | End: 2023-12-13

## 2023-12-13 RX ORDER — ONDANSETRON 2 MG/ML
INJECTION INTRAMUSCULAR; INTRAVENOUS AS NEEDED
Status: DISCONTINUED | OUTPATIENT
Start: 2023-12-13 | End: 2023-12-13

## 2023-12-13 RX ORDER — METOCLOPRAMIDE HYDROCHLORIDE 5 MG/ML
10 INJECTION INTRAMUSCULAR; INTRAVENOUS ONCE AS NEEDED
Status: CANCELLED | OUTPATIENT
Start: 2023-12-13

## 2023-12-13 RX ORDER — DEXAMETHASONE SODIUM PHOSPHATE 10 MG/ML
INJECTION, SOLUTION INTRAMUSCULAR; INTRAVENOUS AS NEEDED
Status: DISCONTINUED | OUTPATIENT
Start: 2023-12-13 | End: 2023-12-13

## 2023-12-13 RX ORDER — SODIUM CHLORIDE 9 MG/ML
INJECTION, SOLUTION INTRAVENOUS CONTINUOUS PRN
Status: DISCONTINUED | OUTPATIENT
Start: 2023-12-13 | End: 2023-12-13

## 2023-12-13 RX ORDER — ROCURONIUM BROMIDE 10 MG/ML
INJECTION, SOLUTION INTRAVENOUS AS NEEDED
Status: DISCONTINUED | OUTPATIENT
Start: 2023-12-13 | End: 2023-12-13

## 2023-12-13 RX ADMIN — ROCURONIUM BROMIDE 10 MG: 10 INJECTION, SOLUTION INTRAVENOUS at 13:23

## 2023-12-13 RX ADMIN — SODIUM CHLORIDE, SODIUM LACTATE, POTASSIUM CHLORIDE, AND CALCIUM CHLORIDE: .6; .31; .03; .02 INJECTION, SOLUTION INTRAVENOUS at 13:12

## 2023-12-13 RX ADMIN — IOHEXOL 3 ML: 300 INJECTION, SOLUTION INTRAVENOUS at 14:10

## 2023-12-13 RX ADMIN — SUGAMMADEX 200 MG: 100 INJECTION, SOLUTION INTRAVENOUS at 14:16

## 2023-12-13 RX ADMIN — Medication 100 MG: at 13:24

## 2023-12-13 RX ADMIN — PROPOFOL 120 MG: 10 INJECTION, EMULSION INTRAVENOUS at 13:23

## 2023-12-13 RX ADMIN — ROCURONIUM BROMIDE 30 MG: 10 INJECTION, SOLUTION INTRAVENOUS at 13:29

## 2023-12-13 RX ADMIN — DEXAMETHASONE SODIUM PHOSPHATE 10 MG: 10 INJECTION, SOLUTION INTRAMUSCULAR; INTRAVENOUS at 13:33

## 2023-12-13 RX ADMIN — ONDANSETRON 4 MG: 2 INJECTION INTRAMUSCULAR; INTRAVENOUS at 13:26

## 2023-12-13 RX ADMIN — LIDOCAINE HYDROCHLORIDE 50 MG: 10 INJECTION, SOLUTION EPIDURAL; INFILTRATION; INTRACAUDAL; PERINEURAL at 13:24

## 2023-12-13 RX ADMIN — FENTANYL CITRATE 50 MCG: 50 INJECTION INTRAMUSCULAR; INTRAVENOUS at 13:23

## 2023-12-13 RX ADMIN — FENTANYL CITRATE 50 MCG: 50 INJECTION INTRAMUSCULAR; INTRAVENOUS at 13:29

## 2023-12-13 NOTE — ANESTHESIA POSTPROCEDURE EVALUATION
Post-Op Assessment Note    CV Status:  Stable  Pain Score: 0    Pain management: adequate       Mental Status:  Awake   Hydration Status:  Stable   PONV Controlled:  None   Airway Patency:  Patent     Post Op Vitals Reviewed: Yes      Staff: Anesthesiologist, CRNA               /73 (12/13/23 1429)    Temp (!) 96.8 °F (36 °C) (12/13/23 1429)    Pulse 92 (12/13/23 1429)   Resp 16 (12/13/23 1429)    SpO2 100 % (12/13/23 1429)

## 2023-12-13 NOTE — ANESTHESIA PREPROCEDURE EVALUATION
Procedure:  ERCP    Relevant Problems   CARDIO   (+) Pure hypercholesterolemia      MUSCULOSKELETAL   (+) Primary osteoarthritis        Physical Exam    Airway    Mallampati score: II  TM Distance: >3 FB  Neck ROM: full     Dental   No notable dental hx     Cardiovascular      Pulmonary      Other Findings  post-pubertal.      Anesthesia Plan  ASA Score- 2     Anesthesia Type- general with ASA Monitors. Additional Monitors:     Airway Plan: ETT. Plan Factors-Exercise tolerance (METS): >4 METS. Chart reviewed. EKG reviewed. Existing labs reviewed. Patient summary reviewed. Patient is not a current smoker. Induction- intravenous. Postoperative Plan- . Planned trial extubation    Informed Consent- Anesthetic plan and risks discussed with patient. I personally reviewed this patient with the CRNA. Discussed and agreed on the Anesthesia Plan with the CRNA. Edith Sims

## 2023-12-18 PROCEDURE — 88305 TISSUE EXAM BY PATHOLOGIST: CPT | Performed by: PATHOLOGY

## 2023-12-19 ENCOUNTER — TELEPHONE (OUTPATIENT)
Dept: GASTROENTEROLOGY | Facility: CLINIC | Age: 68
End: 2023-12-19

## 2023-12-19 NOTE — TELEPHONE ENCOUNTER
----- Message from Ten Rodriguez MD sent at 12/18/2023  1:42 PM EST -----  Small amount of pre cancerous tissue as expected. Repeat ERCP in 6 months

## 2023-12-21 NOTE — TELEPHONE ENCOUNTER
Procedure:  ERCP  Scheduled date of procedure (as of today):  6/4/24  Physician performing procedure: Dr. Rodriguez  Location of procedure: [place]   Instructions reviewed with patient by:  Ne - mailed   Clearances:  n/a

## 2024-01-04 ENCOUNTER — HOSPITAL ENCOUNTER (OUTPATIENT)
Dept: ULTRASOUND IMAGING | Facility: CLINIC | Age: 69
End: 2024-01-04
Payer: MEDICARE

## 2024-01-04 ENCOUNTER — OFFICE VISIT (OUTPATIENT)
Dept: SURGICAL ONCOLOGY | Facility: CLINIC | Age: 69
End: 2024-01-04
Payer: MEDICARE

## 2024-01-04 ENCOUNTER — TELEPHONE (OUTPATIENT)
Dept: HEMATOLOGY ONCOLOGY | Facility: CLINIC | Age: 69
End: 2024-01-04

## 2024-01-04 ENCOUNTER — HOSPITAL ENCOUNTER (OUTPATIENT)
Dept: RADIOLOGY | Facility: HOSPITAL | Age: 69
End: 2024-01-04
Payer: MEDICARE

## 2024-01-04 ENCOUNTER — HOSPITAL ENCOUNTER (OUTPATIENT)
Dept: MAMMOGRAPHY | Facility: CLINIC | Age: 69
Discharge: HOME | End: 2024-01-04
Payer: MEDICARE

## 2024-01-04 VITALS — HEART RATE: 82 BPM | SYSTOLIC BLOOD PRESSURE: 144 MMHG | DIASTOLIC BLOOD PRESSURE: 89 MMHG

## 2024-01-04 VITALS
WEIGHT: 121 LBS | HEART RATE: 80 BPM | OXYGEN SATURATION: 98 % | DIASTOLIC BLOOD PRESSURE: 84 MMHG | SYSTOLIC BLOOD PRESSURE: 118 MMHG | BODY MASS INDEX: 21.44 KG/M2 | HEIGHT: 63 IN

## 2024-01-04 DIAGNOSIS — R92.8 ABNORMAL MAMMOGRAM: ICD-10-CM

## 2024-01-04 DIAGNOSIS — Z01.818 PRE-OP EXAMINATION: ICD-10-CM

## 2024-01-04 DIAGNOSIS — D24.1 INTRADUCTAL PAPILLOMA OF BREAST, RIGHT: Primary | ICD-10-CM

## 2024-01-04 DIAGNOSIS — D24.1 INTRADUCTAL PAPILLOMA OF BREAST, RIGHT: ICD-10-CM

## 2024-01-04 PROCEDURE — 71046 X-RAY EXAM CHEST 2 VIEWS: CPT

## 2024-01-04 PROCEDURE — 19285 PERQ DEV BREAST 1ST US IMAG: CPT

## 2024-01-04 PROCEDURE — 99214 OFFICE O/P EST MOD 30 MIN: CPT | Performed by: SURGERY

## 2024-01-04 RX ORDER — LIDOCAINE HYDROCHLORIDE 10 MG/ML
5 INJECTION, SOLUTION EPIDURAL; INFILTRATION; INTRACAUDAL; PERINEURAL ONCE
Status: COMPLETED | OUTPATIENT
Start: 2024-01-04 | End: 2024-01-04

## 2024-01-04 RX ADMIN — LIDOCAINE HYDROCHLORIDE 5 ML: 10 INJECTION, SOLUTION EPIDURAL; INFILTRATION; INTRACAUDAL; PERINEURAL at 14:15

## 2024-01-04 NOTE — TELEPHONE ENCOUNTER
Patient Call    Who are you speaking with? Patient    If it is not the patient, are they listed on an active communication consent form? N/A   What is the reason for this call? Patient would like a call back in regards to some questions her has   Does this require a call back? Yes   If a call back is required, please list best call back number [phone]   If a call back is required, advise that a message will be forwarded to their care team and someone will return their call as soon as possible.   Did you relay this information to the patient? Yes

## 2024-01-04 NOTE — PROGRESS NOTES
Procedure type:    ___x__ultrasound guided _____stereotactic    Breast:    _____Left ___x__Right    Location: 7 o'clock     Needle: N/A    # of passes: N/A    Clip: SAIGE      Performed by: Dr. Shahid Santoro     Pressure held for 5 minutes by: Loretta Pinto     Stersasha Strips:    _____yes ___x__no    Band aid:    ___x__yes_____no    Tape and guaze:    _____yes ___x__no    Tolerated procedure:    ___x__yes _____no

## 2024-01-04 NOTE — H&P (VIEW-ONLY)
Surgical Oncology Follow Up  [facility]  [facility] [place]  [address] [facility] [address]    Alicia Rai  1955  [id number]      Chief Complaint   Patient presents with   • Pre-op Exam     Right Breast Lumpectomy        Assessment & Plan:   Is a very pleasant 68-year-old female with biopsy-proven 3 mm papilloma recently it has grown to 4 mm and at 7:00 3 cm from the nipple she is here after [facility] clip placement.  Films were reviewed discussed.  Previously surgical consent has been obtained we will proceed with surgery.  We also discussed again the risk benefits alternatives and possible complications.  She is ready to get this done.  Cosmetic concerns as well as scarring were discussed 2.    Cancer History:     Oncology History    No history exists.         Interval History:   Follow-up with right breast papilloma    Review of Systems:   Review of Systems   Constitutional:  Negative for chills and fever.   HENT:  Negative for ear pain and sore throat.    Eyes:  Negative for pain and visual disturbance.   Respiratory:  Negative for cough and shortness of breath.    Cardiovascular:  Negative for chest pain and palpitations.   Gastrointestinal:  Negative for abdominal pain and vomiting.   Genitourinary:  Negative for dysuria and hematuria.   Musculoskeletal:  Negative for arthralgias and back pain.   Skin:  Negative for color change and rash.   Neurological:  Negative for seizures and syncope.   All other systems reviewed and are negative.    Past Medical History     Patient Active Problem List   Diagnosis   • Other microscopic hematuria   • Bile duct abnormality   • Tubular adenoma of colon   • Seborrheic keratosis   • Pure hypercholesterolemia   • Osteoporosis without current pathological fracture   • Primary osteoarthritis   • Colon polyp   • Elevated liver function tests   • Asymptomatic postmenopausal status   • Atrophic vaginitis   •  Pseudoangiomatous stromal hyperplasia of breast   • Intraductal papilloma of breast, right     Past Medical History:   Diagnosis Date   • Colon polyp    • Medical history reviewed with no changes    • Osteoporosis    • Thyroglossal cyst      Past Surgical History:   Procedure Laterality Date   • BREAST BIOPSY Left 2018    benign   • CATARACT EXTRACTION     • COLONOSCOPY      2017   • CYSTOSCOPY  2021    Dr. Jeannette Hadley    • DENTAL IMPLANT     • EGD  2022   • THROAT SURGERY     • US BREAST SAIGE  NEEDLE LOC RIGHT Right 2024   • US GUIDED BREAST BIOPSY RIGHT COMPLETE Right 2023     Family History   Problem Relation Age of Onset   • Dementia Mother    • Diabetes Father    • No Known Problems Maternal Aunt    • No Known Problems Maternal Aunt    • No Known Problems Maternal Aunt    • Breast cancer Cousin 45   • Breast cancer Cousin 45   • Colon cancer Neg Hx    • Ovarian cancer Neg Hx    • Uterine cancer Neg Hx    • Cervical cancer Neg Hx      Social History     Socioeconomic History   • Marital status:      Spouse name: Not on file   • Number of children: Not on file   • Years of education: Not on file   • Highest education level: Not on file   Occupational History   • Not on file   Tobacco Use   • Smoking status: Former     Current packs/day: 0.00     Average packs/day: 0.3 packs/day for 33.0 years (8.3 ttl pk-yrs)     Types: Cigarettes     Start date: 3/17/1971     Quit date: 3/17/2004     Years since quittin.8   • Smokeless tobacco: Never   • Tobacco comments:     quit    Vaping Use   • Vaping status: Never Used   Substance and Sexual Activity   • Alcohol use: Yes     Alcohol/week: 14.0 standard drinks of alcohol     Types: 14 Cans of beer per week     Comment: 2 per day.  I filled this info out for Dr Obed Laws!   • Drug use: Never   • Sexual activity: Not Currently     Partners: Female     Birth control/protection: Post-menopausal   Other Topics Concern   • Not on  file   Social History Narrative   • Not on file     Social Determinants of Health     Financial Resource Strain: Not on file   Food Insecurity: Not on file   Transportation Needs: Not on file   Physical Activity: Not on file   Stress: No Stress Concern Present (12/23/2022)    [employer] [place] [employer] - Occupational Stress Questionnaire    • Feeling of Stress : Not at all   Social Connections: Not on file   Intimate Partner Violence: Not on file   Housing Stability: Not on file       Current Outpatient Medications:   •  calcium carbonate (OS-CARTER) 600 MG tablet, Take 600 mg by mouth 2 (two) times a day with meals, Disp: , Rfl:   •  Cholecalciferol (VITAMIN D3) 1000 units CAPS, Take by mouth, Disp: , Rfl:   •  COLLAGEN PO, Take 5,000 mcg by mouth daily, Disp: , Rfl:   •  folic acid (FOLVITE) 1 mg tablet, Take by mouth daily, Disp: , Rfl:   •  oxyCODONE-acetaminophen (Percocet) 5-325 mg per tablet, Take 1 tablet by mouth every 6 (six) hours as needed for moderate pain or severe pain Max Daily Amount: 4 tablets (Patient not taking: Reported on 1/4/2024), Disp: 20 tablet, Rfl: 0  No current facility-administered medications for this visit.  No Known Allergies    Physical Exam:     Vitals:    01/04/24 1438   BP: 118/84   Pulse: 80   SpO2: 98%     Physical Exam  Constitutional:       Appearance: Normal appearance.   HENT:      Head: Normocephalic and atraumatic.      Nose: Nose normal.      Mouth/Throat:      Mouth: Mucous membranes are moist.   Eyes:      Pupils: Pupils are equal, round, and reactive to light.   Cardiovascular:      Rate and Rhythm: Normal rate.      Pulses: Normal pulses.      Heart sounds: Normal heart sounds.   Pulmonary:      Effort: Pulmonary effort is normal.      Breath sounds: Normal breath sounds.   Abdominal:      General: Bowel sounds are normal.      Palpations: Abdomen is soft.   Musculoskeletal:         General: Normal range of motion.      Cervical back: Normal range of  motion and neck supple.   Skin:     General: Skin is warm.   Neurological:      General: No focal deficit present.      Mental Status: She is alert and oriented to person, place, and time.   Psychiatric:         Mood and Affect: Mood normal.         Behavior: Behavior normal.         Thought Content: Thought content normal.         Judgment: Judgment normal.       Results & Discussion:   I did review post SAIGE placement films and discussed with the patient I did discussed in detail nature of breast disorders including papilloma.  she understands and  agrees . All patient questions were answered.       Advance Care Planning/Advance Directives:  I Tj Seo MD discussed the disease status with Alicia richardson 01/04/24  treatment plans and follow-up with the patient.

## 2024-01-04 NOTE — PROGRESS NOTES
Patient arrived via:    __x___ambulatory    _____wheelchair    _____stretcher      Domestic violence screen    ____x__negative______positive    Breast Implants:    _______yes ___x_____no

## 2024-01-04 NOTE — PROGRESS NOTES
Ice pack given:    ___x__yes _____no        Discharge instructions given to patient:    ___x__yes _____no    Discharged via:    ___x__ambulatory    _____wheelchair    _____stretcher    Stable on discharge:    ___x__yes ____no

## 2024-01-04 NOTE — TELEPHONE ENCOUNTER
I called patient and reviewed why she needed a chest xray prior to surgery. Patient stated understanding and stated she will have it done today either prior to her [facility] appointment or after the appointment with Dr. Seo depending on how busy the lab is. Patient appreciative of call back. Aware of all upcoming appointments. All questions answered at this time

## 2024-01-04 NOTE — PROGRESS NOTES
Surgical Oncology Follow Up  [facility]  [facility] [place]  [address] [address]    Alicia Rai  1955  [id number]      Chief Complaint   Patient presents with   • Pre-op Exam     Right Breast Lumpectomy        Assessment & Plan:   Is a very pleasant 68-year-old female with biopsy-proven 3 mm papilloma recently it has grown to 4 mm and at 7:00 3 cm from the nipple she is here after [facility] clip placement.  Films were reviewed discussed.  Previously surgical consent has been obtained we will proceed with surgery.  We also discussed again the risk benefits alternatives and possible complications.  She is ready to get this done.  Cosmetic concerns as well as scarring were discussed 2.    Cancer History:     Oncology History    No history exists.         Interval History:   Follow-up with right breast papilloma    Review of Systems:   Review of Systems   Constitutional:  Negative for chills and fever.   HENT:  Negative for ear pain and sore throat.    Eyes:  Negative for pain and visual disturbance.   Respiratory:  Negative for cough and shortness of breath.    Cardiovascular:  Negative for chest pain and palpitations.   Gastrointestinal:  Negative for abdominal pain and vomiting.   Genitourinary:  Negative for dysuria and hematuria.   Musculoskeletal:  Negative for arthralgias and back pain.   Skin:  Negative for color change and rash.   Neurological:  Negative for seizures and syncope.   All other systems reviewed and are negative.    Past Medical History     Patient Active Problem List   Diagnosis   • Other microscopic hematuria   • Bile duct abnormality   • Tubular adenoma of colon   • Seborrheic keratosis   • Pure hypercholesterolemia   • Osteoporosis without current pathological fracture   • Primary osteoarthritis   • Colon polyp   • Elevated liver function tests   • Asymptomatic postmenopausal status   • Atrophic vaginitis   •  Pseudoangiomatous stromal hyperplasia of breast   • Intraductal papilloma of breast, right     Past Medical History:   Diagnosis Date   • Colon polyp    • Medical history reviewed with no changes    • Osteoporosis    • Thyroglossal cyst      Past Surgical History:   Procedure Laterality Date   • BREAST BIOPSY Left 2018    benign   • CATARACT EXTRACTION     • COLONOSCOPY      2017   • CYSTOSCOPY  2021    Dr. Jeannette Hadley    • DENTAL IMPLANT     • EGD  2022   • THROAT SURGERY     • US BREAST SAIGE  NEEDLE LOC RIGHT Right 2024   • US GUIDED BREAST BIOPSY RIGHT COMPLETE Right 2023     Family History   Problem Relation Age of Onset   • Dementia Mother    • Diabetes Father    • No Known Problems Maternal Aunt    • No Known Problems Maternal Aunt    • No Known Problems Maternal Aunt    • Breast cancer Cousin 45   • Breast cancer Cousin 45   • Colon cancer Neg Hx    • Ovarian cancer Neg Hx    • Uterine cancer Neg Hx    • Cervical cancer Neg Hx      Social History     Socioeconomic History   • Marital status:      Spouse name: Not on file   • Number of children: Not on file   • Years of education: Not on file   • Highest education level: Not on file   Occupational History   • Not on file   Tobacco Use   • Smoking status: Former     Current packs/day: 0.00     Average packs/day: 0.3 packs/day for 33.0 years (8.3 ttl pk-yrs)     Types: Cigarettes     Start date: 3/17/1971     Quit date: 3/17/2004     Years since quittin.8   • Smokeless tobacco: Never   • Tobacco comments:     quit    Vaping Use   • Vaping status: Never Used   Substance and Sexual Activity   • Alcohol use: Yes     Alcohol/week: 14.0 standard drinks of alcohol     Types: 14 Cans of beer per week     Comment: 2 per day.  I filled this info out for Dr Obed Laws!   • Drug use: Never   • Sexual activity: Not Currently     Partners: Female     Birth control/protection: Post-menopausal   Other Topics Concern   • Not on  file   Social History Narrative   • Not on file     Social Determinants of Health     Financial Resource Strain: Not on file   Food Insecurity: Not on file   Transportation Needs: Not on file   Physical Activity: Not on file   Stress: No Stress Concern Present (12/23/2022)    [place] [employer] - Occupational Stress Questionnaire    • Feeling of Stress : Not at all   Social Connections: Not on file   Intimate Partner Violence: Not on file   Housing Stability: Not on file       Current Outpatient Medications:   •  calcium carbonate (OS-CARTER) 600 MG tablet, Take 600 mg by mouth 2 (two) times a day with meals, Disp: , Rfl:   •  Cholecalciferol (VITAMIN D3) 1000 units CAPS, Take by mouth, Disp: , Rfl:   •  COLLAGEN PO, Take 5,000 mcg by mouth daily, Disp: , Rfl:   •  folic acid (FOLVITE) 1 mg tablet, Take by mouth daily, Disp: , Rfl:   •  oxyCODONE-acetaminophen (Percocet) 5-325 mg per tablet, Take 1 tablet by mouth every 6 (six) hours as needed for moderate pain or severe pain Max Daily Amount: 4 tablets (Patient not taking: Reported on 1/4/2024), Disp: 20 tablet, Rfl: 0  No current facility-administered medications for this visit.  No Known Allergies    Physical Exam:     Vitals:    01/04/24 1438   BP: 118/84   Pulse: 80   SpO2: 98%     Physical Exam  Constitutional:       Appearance: Normal appearance.   HENT:      Head: Normocephalic and atraumatic.      Nose: Nose normal.      Mouth/Throat:      Mouth: Mucous membranes are moist.   Eyes:      Pupils: Pupils are equal, round, and reactive to light.   Cardiovascular:      Rate and Rhythm: Normal rate.      Pulses: Normal pulses.      Heart sounds: Normal heart sounds.   Pulmonary:      Effort: Pulmonary effort is normal.      Breath sounds: Normal breath sounds.   Abdominal:      General: Bowel sounds are normal.      Palpations: Abdomen is soft.   Musculoskeletal:         General: Normal range of motion.      Cervical back: Normal range of  motion and neck supple.   Skin:     General: Skin is warm.   Neurological:      General: No focal deficit present.      Mental Status: She is alert and oriented to person, place, and time.   Psychiatric:         Mood and Affect: Mood normal.         Behavior: Behavior normal.         Thought Content: Thought content normal.         Judgment: Judgment normal.       Results & Discussion:   I did review post SAIGE placement films and discussed with the patient I did discussed in detail nature of breast disorders including papilloma.  she understands and  agrees . All patient questions were answered.       Advance Care Planning/Advance Directives:  I Tj Seo MD discussed the disease status with Alicia richardson 01/04/24  treatment plans and follow-up with the patient.

## 2024-01-05 ENCOUNTER — TELEPHONE (OUTPATIENT)
Dept: SURGICAL ONCOLOGY | Facility: CLINIC | Age: 69
End: 2024-01-05

## 2024-01-05 NOTE — PRE-PROCEDURE INSTRUCTIONS
Pre-Surgery Instructions:   Medication Instructions    calcium carbonate (OS-CARTER) 600 MG tablet Hold day of surgery      Cholecalciferol (VITAMIN D3) 1000 units CAPS Hold day of surgery      COLLAGEN PO Hold day of surgery      folic acid (FOLVITE) 1 mg tablet Hold day of surgery      Medication instructions for day surgery reviewed. Please use only a sip of water to take your instructed medications. Avoid all over the counter vitamins, supplements and NSAIDS for one week prior to surgery per anesthesia guidelines. Tylenol is ok to take as needed.     You will receive a call one business day prior to surgery with an arrival time and hospital directions. If your surgery is scheduled on a Monday, the hospital will be calling you on the Friday prior to your surgery. If you have not heard from anyone by 8pm, please call the hospital supervisor through the hospital  at [phone]. (Shen [phone]).    Do not eat or drink anything after midnight the night before your surgery, including candy, mints, lifesavers, or chewing gum. Do not drink alcohol 24hrs before your surgery. Try not to smoke at least 24hrs before your surgery.       Follow the pre surgery showering instructions as listed in the “My Surgical Experience Booklet” or otherwise provided by your surgeon's office. Do not use a blade to shave the surgical area 1 week before surgery. It is okay to use a clean electric clippers up to 24 hours before surgery. Do not apply any lotions, creams, including makeup, cologne, deodorant, or perfumes after showering on the day of your surgery. Do not use dry shampoo, hair spray, hair gel, or any type of hair products.     No contact lenses, eye make-up, or artificial eyelashes. Remove nail polish, including gel polish, and any artificial, gel, or acrylic nails if possible. Remove all jewelry including rings and body piercing jewelry.     Wear causal clothing that is easy to take on and off. Consider your type  of surgery.    Keep any valuables, jewelry, piercings at home. Please bring any specially ordered equipment (sling, braces) if indicated.    Arrange for a responsible person to drive you to and from the hospital on the day of your surgery. Visitor Guidelines discussed.     Call the surgeon's office with any new illnesses, exposures, or additional questions prior to surgery.    Please reference your “My Surgical Experience Booklet” for additional information to prepare for your upcoming surgery.    Pt was upset with the fact that she would not get arrival time until after 2 day before. Message sent to SS at [facility] asking if they could contact pt earlier if possible with arrival time. Pt also given [facility]#

## 2024-01-05 NOTE — TELEPHONE ENCOUNTER
"Roger Lombardo, it's Alicia Rai. Date of birth [birth year] Four I'm having the one testimony with Doctor Nyasia on January 11th and I'm just checking to see what time I need to arrive. For that please give me a call back. I can be reached at [phone], I'm sorry, [phone]. Thank you.    Received this voicemail. Called patient back. She asked what time her surgery was. I advised unfortunately I cannot tell her that as the OR sets everything up. advised they will call her the day before to let her know what time, where to go, and where to park. I did explain as of right now she is first case so it looks like she would need to be at the hospital around 6am but that can always change and is not set in stone until the OR calls her the day before. Patient got frustrated stating \"see this is the issue, you guys want me to inconvenience  people and get a ride there and back. I can't just do that at the drop of a hat.  What I'll do is drive myself there and sign a paper saying I'll drive myself home\". I advised that's not a good thing as she will be under general anesthesia and can potentially get in an accident and hurt someone else or herself. I advised I can reach out to the social workers to see if the deliob can take her home and she asked me how much that will cost her. As I'm not too aware of the service I stated I wasn't sure but the social workers can review that with her. She cut me off and exclaimed \"I will just drive myself there and inconvenience other people to pick me and my car up that day\". Patient continued being angry exclaiming \"I wish I would have never done this. I've been seeing so many doctors that are so pointless, it's really all unnecessary\". I asked if moving the surgery would help if we re-arranged for her to be a later case and she exclaimed \"you people do what  you want. Ok? Goodbye\" and ended the phone call. Telephone encounter will be routed to surgeon, social workers, and surgery coordinator so " all are aware.

## 2024-01-05 NOTE — PROGRESS NOTES
Post procedure call completed    Bleeding: _____yes __X___no (pt denies)    Pain: _____yes ___X___no (pt denies, used ice, no need for OTC pain meds)    Redness/Swelling: ______yes ___X___no (pt denies)    Band aid removed: ___X__yes _____no     Pt inquired about Hibicleanse soap for surgery, adv that she can  at outpatient registration in the hospital lobby anytime before Wednesday, adv to call with any questions or concerns, has name/# for contact

## 2024-01-08 NOTE — TELEPHONE ENCOUNTER
Munira Daley, it's Alicia Rai. Date of birth [birth year], 4:00. Please give me a call back when you get a chance, [phone]. Thank you.      Patient was extremely apologetic about her phone call on Friday to me! I advised it was  unnecessary but appreciated. I offered to change her surgery time but patient was agreeable to remain the first case. I advised I don't control this the OR does but I will request to keep her as first case. I advised that we can potentially set up a lyft for her but patient stated she is going to drive herself in the morning and she will call her son once she's finished with her surgery to come pick her and her car up. Patient politely declined any transportation. All questions answered at this time. Patient appreciative.

## 2024-01-10 ENCOUNTER — ANESTHESIA EVENT (OUTPATIENT)
Dept: PERIOP | Facility: HOSPITAL | Age: 69
End: 2024-01-10
Payer: MEDICARE

## 2024-01-11 ENCOUNTER — HOSPITAL ENCOUNTER (OUTPATIENT)
Facility: HOSPITAL | Age: 69
Setting detail: OUTPATIENT SURGERY
Discharge: HOME | End: 2024-01-11
Attending: SURGERY | Admitting: SURGERY
Payer: MEDICARE

## 2024-01-11 ENCOUNTER — ANESTHESIA (OUTPATIENT)
Dept: PERIOP | Facility: HOSPITAL | Age: 69
End: 2024-01-11
Payer: MEDICARE

## 2024-01-11 ENCOUNTER — APPOINTMENT (OUTPATIENT)
Dept: RADIOLOGY | Facility: HOSPITAL | Age: 69
End: 2024-01-11
Payer: MEDICARE

## 2024-01-11 VITALS
SYSTOLIC BLOOD PRESSURE: 137 MMHG | WEIGHT: 119.49 LBS | DIASTOLIC BLOOD PRESSURE: 81 MMHG | OXYGEN SATURATION: 99 % | RESPIRATION RATE: 24 BRPM | BODY MASS INDEX: 21.17 KG/M2 | HEIGHT: 63 IN | HEART RATE: 73 BPM | TEMPERATURE: 98.2 F

## 2024-01-11 DIAGNOSIS — N64.89 PSEUDOANGIOMATOUS STROMAL HYPERPLASIA OF BREAST: ICD-10-CM

## 2024-01-11 DIAGNOSIS — D24.1 INTRADUCTAL PAPILLOMA OF BREAST, RIGHT: ICD-10-CM

## 2024-01-11 PROCEDURE — 88342 IMHCHEM/IMCYTCHM 1ST ANTB: CPT | Performed by: PATHOLOGY

## 2024-01-11 PROCEDURE — 19301 PARTIAL MASTECTOMY: CPT | Performed by: SURGERY

## 2024-01-11 PROCEDURE — 76098 X-RAY EXAM SURGICAL SPECIMEN: CPT | Performed by: SURGERY

## 2024-01-11 PROCEDURE — 19301 PARTIAL MASTECTOMY: CPT

## 2024-01-11 PROCEDURE — 88307 TISSUE EXAM BY PATHOLOGIST: CPT | Performed by: PATHOLOGY

## 2024-01-11 RX ORDER — MIDAZOLAM HYDROCHLORIDE 2 MG/2ML
INJECTION, SOLUTION INTRAMUSCULAR; INTRAVENOUS AS NEEDED
Status: DISCONTINUED | OUTPATIENT
Start: 2024-01-11 | End: 2024-01-11

## 2024-01-11 RX ORDER — SCOLOPAMINE TRANSDERMAL SYSTEM 1 MG/1
PATCH, EXTENDED RELEASE TRANSDERMAL AS NEEDED
Status: DISCONTINUED | OUTPATIENT
Start: 2024-01-11 | End: 2024-01-11

## 2024-01-11 RX ORDER — FENTANYL CITRATE 50 UG/ML
INJECTION, SOLUTION INTRAMUSCULAR; INTRAVENOUS AS NEEDED
Status: DISCONTINUED | OUTPATIENT
Start: 2024-01-11 | End: 2024-01-11

## 2024-01-11 RX ORDER — PROPOFOL 10 MG/ML
INJECTION, EMULSION INTRAVENOUS AS NEEDED
Status: DISCONTINUED | OUTPATIENT
Start: 2024-01-11 | End: 2024-01-11

## 2024-01-11 RX ORDER — ONDANSETRON 2 MG/ML
4 INJECTION INTRAMUSCULAR; INTRAVENOUS ONCE AS NEEDED
Status: DISCONTINUED | OUTPATIENT
Start: 2024-01-11 | End: 2024-01-11 | Stop reason: HOSPADM

## 2024-01-11 RX ORDER — FENTANYL CITRATE/PF 50 MCG/ML
50 SYRINGE (ML) INJECTION
Status: DISCONTINUED | OUTPATIENT
Start: 2024-01-11 | End: 2024-01-11 | Stop reason: HOSPADM

## 2024-01-11 RX ORDER — ONDANSETRON 2 MG/ML
INJECTION INTRAMUSCULAR; INTRAVENOUS AS NEEDED
Status: DISCONTINUED | OUTPATIENT
Start: 2024-01-11 | End: 2024-01-11

## 2024-01-11 RX ORDER — SODIUM CHLORIDE, SODIUM LACTATE, POTASSIUM CHLORIDE, CALCIUM CHLORIDE 600; 310; 30; 20 MG/100ML; MG/100ML; MG/100ML; MG/100ML
INJECTION, SOLUTION INTRAVENOUS CONTINUOUS PRN
Status: DISCONTINUED | OUTPATIENT
Start: 2024-01-11 | End: 2024-01-11

## 2024-01-11 RX ORDER — SCOLOPAMINE TRANSDERMAL SYSTEM 1 MG/1
PATCH, EXTENDED RELEASE TRANSDERMAL
Status: COMPLETED
Start: 2024-01-11 | End: 2024-01-11

## 2024-01-11 RX ORDER — CEFAZOLIN SODIUM 1 G/50ML
1000 SOLUTION INTRAVENOUS ONCE
Status: COMPLETED | OUTPATIENT
Start: 2024-01-11 | End: 2024-01-11

## 2024-01-11 RX ORDER — OXYCODONE HYDROCHLORIDE AND ACETAMINOPHEN 5; 325 MG/1; MG/1
1 TABLET ORAL EVERY 4 HOURS PRN
Status: DISCONTINUED | OUTPATIENT
Start: 2024-01-11 | End: 2024-01-12 | Stop reason: HOSPADM

## 2024-01-11 RX ORDER — LIDOCAINE HYDROCHLORIDE 20 MG/ML
INJECTION, SOLUTION EPIDURAL; INFILTRATION; INTRACAUDAL; PERINEURAL AS NEEDED
Status: DISCONTINUED | OUTPATIENT
Start: 2024-01-11 | End: 2024-01-11

## 2024-01-11 RX ORDER — MAGNESIUM HYDROXIDE 1200 MG/15ML
LIQUID ORAL AS NEEDED
Status: DISCONTINUED | OUTPATIENT
Start: 2024-01-11 | End: 2024-01-11 | Stop reason: HOSPADM

## 2024-01-11 RX ORDER — DEXAMETHASONE SODIUM PHOSPHATE 10 MG/ML
INJECTION, SOLUTION INTRAMUSCULAR; INTRAVENOUS AS NEEDED
Status: DISCONTINUED | OUTPATIENT
Start: 2024-01-11 | End: 2024-01-11

## 2024-01-11 RX ADMIN — SCOPALAMINE 1 PATCH: 1 PATCH, EXTENDED RELEASE TRANSDERMAL at 07:51

## 2024-01-11 RX ADMIN — FENTANYL CITRATE 50 MCG: 50 INJECTION, SOLUTION INTRAMUSCULAR; INTRAVENOUS at 08:13

## 2024-01-11 RX ADMIN — DEXAMETHASONE SODIUM PHOSPHATE 10 MG: 10 INJECTION INTRAMUSCULAR; INTRAVENOUS at 08:12

## 2024-01-11 RX ADMIN — SODIUM CHLORIDE, SODIUM LACTATE, POTASSIUM CHLORIDE, AND CALCIUM CHLORIDE: .6; .31; .03; .02 INJECTION, SOLUTION INTRAVENOUS at 07:59

## 2024-01-11 RX ADMIN — PROPOFOL 150 MG: 10 INJECTION, EMULSION INTRAVENOUS at 08:03

## 2024-01-11 RX ADMIN — CEFAZOLIN SODIUM 1000 MG: 1 SOLUTION INTRAVENOUS at 07:36

## 2024-01-11 RX ADMIN — MIDAZOLAM HYDROCHLORIDE 2 MG: 1 INJECTION, SOLUTION INTRAMUSCULAR; INTRAVENOUS at 07:59

## 2024-01-11 RX ADMIN — LIDOCAINE HYDROCHLORIDE 60 MG: 20 INJECTION, SOLUTION EPIDURAL; INFILTRATION; INTRACAUDAL; PERINEURAL at 08:03

## 2024-01-11 RX ADMIN — ONDANSETRON 4 MG: 2 INJECTION INTRAMUSCULAR; INTRAVENOUS at 08:12

## 2024-01-11 NOTE — OP NOTE
OPERATIVE REPORT  PATIENT NAME: Alicia Rai    :  1955  MRN: [id number]  Pt Location: [facility] OR ROOM 01    SURGERY DATE: 2024    Surgeons and Role:     * Tj Seo MD - Primary     * Rene Piedra PA-C - Assisting    Preop Diagnosis:  Intraductal papilloma of breast, right [D24.1]  Pseudoangiomatous stromal hyperplasia of breast [N64.89]    Post-Op Diagnosis Codes:     * Intraductal papilloma of breast, right [D24.1]     * Pseudoangiomatous stromal hyperplasia of breast [N64.89]    Procedure(s):  Right - RIGHT BREAST MITCHELL  DIRECTED LUMPECTOMY    Specimen(s):  ID Type Source Tests Collected by Time Destination   1 : Right Breast mitchell directed lumpectomy suture marks short superior, long lateral Tissue Breast, Right TISSUE EXAM Tj Seo MD 2024 0828        Estimated Blood Loss:   Minimal    Drains:  * No LDAs found *    Anesthesia Type:   General    Operative Indications:  Intraductal papilloma of breast, right [D24.1]  Pseudoangiomatous stromal hyperplasia of breast [N64.89]      Operative Findings:  Biopsy clip and Mitchell clip site in the specimen.    Complications:   None    Procedure and Technique:  I previously reviewed the post biopsy images.      Lumpectomy  Alicia Rai was brought to the operation room and placed under general anesthesia.   Attention was paid to ensure appropriate padding and correct positioning.  The MITCHELL  detector was then used to locate the position of the MITCHELL deflector and the skin was marked in this area.  The  right breast was prepped and draped in a sterile fashion.  I initiated a time-out, identifying the patient, the correct side and the above procedure.  All parties agreed with the time out.      The planned incision was sharply incised.  The MITCHELL dectector was used to guide the dissection.  Superior, inferior, medial and lateral planes were developed around the MITCHELL deflector and the specimen truncated with  electrocautery beyond the deflector.  The specimen was then sutured for orientation purposes.  A specimen radiograph was taken in two dimensions. specimens was sent to pathology for permanent analysis.  Superficial bleeding controlled with electrocautery and the wound was extensively irrigated.  The wound was closed with two layers, an inner layer of 3-0 vicryl and a subcuticular layer of 4-0 monocryl.  Exofin were applied.  The counts were correct x 2.   I was present for the entire procedure. and A physician assistant was required during the procedure for retraction, tissue handling, dissection and suturing.    Patient Disposition:  PACU     This procedure was not performed to treat breast cancer through sentinel node biopsy      SIGNATURE: Tj Seo MD  DATE: January 11, 2024  TIME: 8:31 AM

## 2024-01-11 NOTE — INTERVAL H&P NOTE
H&P reviewed. After examining the patient I find no changes in the patients condition since the H&P had been written.    Vitals:    01/11/24 0710   BP: 138/74   Pulse: 88   Resp: 18   Temp: 98.1 °F (36.7 °C)   SpO2: 97%

## 2024-01-11 NOTE — ANESTHESIA POSTPROCEDURE EVALUATION
Post-Op Assessment Note    CV Status:  Stable  Pain Score: 0    Pain management: adequate       Mental Status:  Alert and awake   Hydration Status:  Euvolemic   PONV Controlled:  Controlled   Airway Patency:  Patent     Post Op Vitals Reviewed: Yes      Staff: CRNA               BP   133/79   Temp 98.3   Pulse 72   Resp 15   SpO2 100

## 2024-01-11 NOTE — ANESTHESIA PREPROCEDURE EVALUATION
Procedure:  RIGHT BREAST SAIGE  DIRECTED LUMPECTOMY (Right: Breast)    Drinks 2 beers/day      Relevant Problems   CARDIO   (+) Pure hypercholesterolemia      MUSCULOSKELETAL   (+) Primary osteoarthritis        Physical Exam    Airway    Mallampati score: II  TM Distance: >3 FB  Neck ROM: full     Dental   No notable dental hx     Cardiovascular  Cardiovascular exam normal    Pulmonary  Pulmonary exam normal     Other Findings  post-pubertal.      Anesthesia Plan  ASA Score- 2     Anesthesia Type- general with ASA Monitors.         Additional Monitors:     Airway Plan: ETT and LMA.           Plan Factors-Exercise tolerance (METS): >4 METS.    Chart reviewed. EKG reviewed.  Existing labs reviewed. Patient summary reviewed.    Patient is not a current smoker.              Induction- intravenous.    Postoperative Plan- . Planned trial extubation    Informed Consent- Anesthetic plan and risks discussed with patient.  I personally reviewed this patient with the CRNA. Discussed and agreed on the Anesthesia Plan with the CRNA..

## 2024-01-16 PROCEDURE — 88307 TISSUE EXAM BY PATHOLOGIST: CPT | Performed by: PATHOLOGY

## 2024-01-16 PROCEDURE — 88342 IMHCHEM/IMCYTCHM 1ST ANTB: CPT | Performed by: PATHOLOGY

## 2024-01-18 LAB
DME PARACHUTE DELIVERY DATE ACTUAL: NORMAL
DME PARACHUTE DELIVERY DATE REQUESTED: NORMAL
DME PARACHUTE ITEM DESCRIPTION: NORMAL
DME PARACHUTE ORDER STATUS: NORMAL
DME PARACHUTE SUPPLIER NAME: NORMAL
DME PARACHUTE SUPPLIER PHONE: NORMAL

## 2024-01-30 ENCOUNTER — OFFICE VISIT (OUTPATIENT)
Age: 69
End: 2024-01-30
Payer: MEDICARE

## 2024-01-30 VITALS
HEART RATE: 78 BPM | OXYGEN SATURATION: 98 % | TEMPERATURE: 97.5 F | BODY MASS INDEX: 21.72 KG/M2 | RESPIRATION RATE: 18 BRPM | DIASTOLIC BLOOD PRESSURE: 80 MMHG | SYSTOLIC BLOOD PRESSURE: 120 MMHG | WEIGHT: 122.6 LBS

## 2024-01-30 DIAGNOSIS — E55.9 INADEQUATE VITAMIN D AND VITAMIN D DERIVATIVE INTAKE: ICD-10-CM

## 2024-01-30 DIAGNOSIS — E78.00 PURE HYPERCHOLESTEROLEMIA: ICD-10-CM

## 2024-01-30 DIAGNOSIS — Z78.0 ASYMPTOMATIC UNCOMPLICATED MENOPAUSE: ICD-10-CM

## 2024-01-30 DIAGNOSIS — N18.2 CKD (CHRONIC KIDNEY DISEASE) STAGE 2, GFR 60-89 ML/MIN: ICD-10-CM

## 2024-01-30 DIAGNOSIS — M81.0 AGE-RELATED OSTEOPOROSIS WITHOUT CURRENT PATHOLOGICAL FRACTURE: ICD-10-CM

## 2024-01-30 DIAGNOSIS — E55.9 VITAMIN D INSUFFICIENCY: ICD-10-CM

## 2024-01-30 DIAGNOSIS — F41.1 GENERALIZED ANXIETY DISORDER: Primary | ICD-10-CM

## 2024-01-30 DIAGNOSIS — R31.29 OTHER MICROSCOPIC HEMATURIA: ICD-10-CM

## 2024-01-30 PROBLEM — R79.89 ELEVATED LIVER FUNCTION TESTS: Status: RESOLVED | Noted: 2019-08-01 | Resolved: 2024-01-30

## 2024-01-30 PROCEDURE — 99214 OFFICE O/P EST MOD 30 MIN: CPT | Performed by: FAMILY MEDICINE

## 2024-01-30 NOTE — ASSESSMENT & PLAN NOTE
Struggling with anxiety.  Does not want to take any anxiolytics  Interested in talk therapy.  She is in contact to getting that established.    Follow-up at next appointment.

## 2024-01-30 NOTE — ASSESSMENT & PLAN NOTE
Lab Results   Component Value Date    EGFR 67 12/08/2023    EGFR 69 12/23/2022    EGFR 68 11/22/2021    CREATININE 0.88 12/08/2023    CREATININE 0.86 12/23/2022    CREATININE 0.88 11/22/2021       Improve lifestyle and dietary changes to slow its progression  Labs reviewed  Improve lifestyle and dietary changes to slow its progression  Low-sodium, low-carb diet, limit snacking, exercise regularly.  Avoid nephrotoxic agents including over-the-counter NSAIDs as discussed  Hydrate well with water.

## 2024-01-30 NOTE — PROGRESS NOTES
[facility]  [address] [place]    Name: Alicia Rai      YOB: 1955      MRN: [id number]  Encounter Provider: Russel Meyer MD      Encounter Date: 01/30/24      Encounter Dept: [facility]    Assessment & Plan     1. Generalized anxiety disorder  Assessment & Plan:  Struggling with anxiety.  Does not want to take any anxiolytics  Interested in talk therapy.  She is in contact to getting that established.    Follow-up at next appointment.    Orders:  -     TSH, 3rd generation with Free T4 reflex; Future; Expected date: 01/30/2024    2. CKD (chronic kidney disease) stage 2, GFR 60-89 ml/min  Assessment & Plan:  Lab Results   Component Value Date    EGFR 67 12/08/2023    EGFR 69 12/23/2022    EGFR 68 11/22/2021    CREATININE 0.88 12/08/2023    CREATININE 0.86 12/23/2022    CREATININE 0.88 11/22/2021       Improve lifestyle and dietary changes to slow its progression  Labs reviewed  Improve lifestyle and dietary changes to slow its progression  Low-sodium, low-carb diet, limit snacking, exercise regularly.  Avoid nephrotoxic agents including over-the-counter NSAIDs as discussed  Hydrate well with water.      Orders:  -     TSH, 3rd generation with Free T4 reflex; Future; Expected date: 01/30/2024    3. Pure hypercholesterolemia  Assessment & Plan:  Follow-up with blood work    Orders:  -     Lipid Panel with Direct LDL reflex; Future; Expected date: 01/30/2024    4. Vitamin D insufficiency  -     Vitamin D 25 hydroxy; Future    5. Inadequate vitamin D and vitamin D derivative intake  -     Vitamin D 25 hydroxy; Future    6. Other microscopic hematuria  -     Urinalysis with microscopic; Future    7. Age-related osteoporosis without current pathological fracture    8. Asymptomatic uncomplicated menopause  -     DXA bone density spine hip and pelvis; Future; Expected date: 01/30/2024              Pertinent labs were evaluated and discussed with patient today.       Follow-Up Plans   Return in about 2 weeks (around 2/13/2024) for chronic disease management.      ____________________________________________________________    Subjective   Alicia Rai is a 68 y.o. with  has a past medical history of Colon polyp, Medical history reviewed with no changes, Osteoporosis, and Thyroglossal cyst.      HPI      Review of Systems   Constitutional:  Negative for chills, fever and unexpected weight change.   HENT:  Negative for congestion, rhinorrhea and sore throat.    Eyes:  Negative for visual disturbance.   Respiratory:  Negative for chest tightness, shortness of breath and wheezing.    Cardiovascular:  Negative for chest pain.   Gastrointestinal:  Negative for abdominal pain, constipation, diarrhea, nausea and vomiting.   Endocrine: Negative for polyuria.   Genitourinary:  Negative for dysuria and hematuria.   Skin:  Negative for rash.   Neurological:  Negative for dizziness, weakness, light-headedness and headaches.   Psychiatric/Behavioral:  Negative for confusion.          Current Medication List     Current Outpatient Medications:     calcium carbonate (OS-CARTER) 600 MG tablet, Take 600 mg by mouth daily, Disp: , Rfl:     Cholecalciferol (VITAMIN D3) 1000 units CAPS, Take by mouth in the morning, Disp: , Rfl:     COLLAGEN PO, Take 5,000 mcg by mouth daily, Disp: , Rfl:     folic acid (FOLVITE) 1 mg tablet, Take by mouth daily, Disp: , Rfl:       Objective     /80 (BP Location: Left arm, Patient Position: Sitting, Cuff Size: Standard)   Pulse 78   Temp 97.5 °F (36.4 °C) (Temporal)   Resp 18   Wt 55.6 kg (122 lb 9.6 oz)   LMP  (LMP Unknown)   SpO2 98%   BMI 21.72 kg/m²      Physical Exam  Vitals reviewed.   Constitutional:       General: She is not in acute distress.     Appearance: Normal appearance. She is not ill-appearing, toxic-appearing or diaphoretic.   HENT:      Head: Normocephalic and  atraumatic.      Right Ear: External ear normal.      Left Ear: External ear normal.      Nose: Nose normal.      Mouth/Throat:      Mouth: Mucous membranes are moist.   Eyes:      General: No scleral icterus.        Right eye: No discharge.         Left eye: No discharge.      Extraocular Movements: Extraocular movements intact.      Conjunctiva/sclera: Conjunctivae normal.   Cardiovascular:      Rate and Rhythm: Normal rate and regular rhythm.      Pulses: Normal pulses.      Heart sounds: Normal heart sounds.   Pulmonary:      Effort: Pulmonary effort is normal. No respiratory distress.      Breath sounds: Normal breath sounds.   Abdominal:      Palpations: Abdomen is soft.      Tenderness: There is no abdominal tenderness.   Musculoskeletal:         General: No swelling. Normal range of motion.      Cervical back: Normal range of motion.   Skin:     General: Skin is warm and dry.   Neurological:      General: No focal deficit present.      Mental Status: She is alert and oriented to person, place, and time.   Psychiatric:         Mood and Affect: Mood normal.         Behavior: Behavior normal.         Thought Content: Thought content normal.           Russel Meyer MD  Family Medicine Physician   [facility] [place]

## 2024-01-31 ENCOUNTER — TELEPHONE (OUTPATIENT)
Dept: SURGICAL ONCOLOGY | Facility: CLINIC | Age: 69
End: 2024-01-31

## 2024-01-31 NOTE — TELEPHONE ENCOUNTER
Called patient and left message if patient is able to come in earlier for appt tomorrow with Dr. Seo on 2/1/24 due to openings in the schedule.

## 2024-02-01 ENCOUNTER — OFFICE VISIT (OUTPATIENT)
Dept: SURGICAL ONCOLOGY | Facility: CLINIC | Age: 69
End: 2024-02-01

## 2024-02-01 VITALS
DIASTOLIC BLOOD PRESSURE: 88 MMHG | HEIGHT: 63 IN | BODY MASS INDEX: 21.62 KG/M2 | OXYGEN SATURATION: 97 % | SYSTOLIC BLOOD PRESSURE: 132 MMHG | WEIGHT: 122 LBS | HEART RATE: 92 BPM

## 2024-02-01 DIAGNOSIS — D24.1 INTRADUCTAL PAPILLOMA OF BREAST, RIGHT: Primary | ICD-10-CM

## 2024-02-01 PROCEDURE — 99024 POSTOP FOLLOW-UP VISIT: CPT | Performed by: SURGERY

## 2024-02-01 NOTE — PROGRESS NOTES
Surgical Oncology Follow Up  [facility]  [facility] [place]  [address] [facility] [id number]    Alicia Rai  1955  [phone]      Chief Complaint   Patient presents with   • Post-op     post op 1/11 right breast        Assessment & Plan:   Follow-up after right breast [facility]  directed lumpectomy well-healed surgical site pathology was discussed and copy of the report was given to the patient.  We will follow her as needed she will continue to follow her with her gynecologist who will order appropriate images.    Cancer History:     Oncology History    No history exists.         Interval History:   After right breast lumpectomy        Review of Systems:   Review of Systems   Constitutional:  Negative for chills and fever.   HENT:  Negative for ear pain and sore throat.    Eyes:  Negative for pain and visual disturbance.   Respiratory:  Negative for cough and shortness of breath.    Cardiovascular:  Negative for chest pain and palpitations.   Gastrointestinal:  Negative for abdominal pain and vomiting.   Genitourinary:  Negative for dysuria and hematuria.   Musculoskeletal:  Negative for arthralgias and back pain.   Skin:  Negative for color change and rash.   Neurological:  Negative for seizures and syncope.   All other systems reviewed and are negative.    Past Medical History     Patient Active Problem List   Diagnosis   • Other microscopic hematuria   • Bile duct abnormality   • Tubular adenoma of colon   • Seborrheic keratosis   • Pure hypercholesterolemia   • Osteoporosis without current pathological fracture   • Primary osteoarthritis   • Colon polyp   • Asymptomatic postmenopausal status   • Atrophic vaginitis   • Pseudoangiomatous stromal hyperplasia of breast   • Intraductal papilloma of breast, right   • CKD (chronic kidney disease) stage 2, GFR 60-89 ml/min   • Generalized anxiety disorder     Past Medical History:   Diagnosis Date   •  Colon polyp    • Medical history reviewed with no changes    • Osteoporosis    • Thyroglossal cyst      Past Surgical History:   Procedure Laterality Date   • BREAST BIOPSY Left 2018    benign   • CATARACT EXTRACTION     • COLONOSCOPY      2017   • CYSTOSCOPY  2021    Dr. Jeannette Hadley    • DENTAL IMPLANT     • EGD  2022   • [place] MASTECTOMY PARTIAL Right 2024    Procedure: RIGHT BREAST SAIGE  DIRECTED LUMPECTOMY;  Surgeon: Tj Seo MD;  Location: [facility];  Service: Surgical Oncology   • THROAT SURGERY     • UPPER GASTROINTESTINAL ENDOSCOPY  2021    Bile duct abnormality   • US BREAST SAIGE  NEEDLE LOC RIGHT Right 2024   • US GUIDED BREAST BIOPSY RIGHT COMPLETE Right 2023     Family History   Problem Relation Age of Onset   • Dementia Mother    • Diabetes Father    • No Known Problems Maternal Aunt    • No Known Problems Maternal Aunt    • No Known Problems Maternal Aunt    • Breast cancer Cousin 45   • Breast cancer Cousin 45   • Colon cancer Neg Hx    • Ovarian cancer Neg Hx    • Uterine cancer Neg Hx    • Cervical cancer Neg Hx      Social History     Socioeconomic History   • Marital status:      Spouse name: Not on file   • Number of children: Not on file   • Years of education: Not on file   • Highest education level: Not on file   Occupational History   • Not on file   Tobacco Use   • Smoking status: Former     Current packs/day: 0.00     Average packs/day: 0.3 packs/day for 33.2 years (8.3 ttl pk-yrs)     Types: Cigarettes     Start date: 3/17/1971     Quit date: 3/17/2004     Years since quittin.8   • Smokeless tobacco: Never   • Tobacco comments:     quit    Vaping Use   • Vaping status: Never Used   Substance and Sexual Activity   • Alcohol use: Yes     Alcohol/week: 14.0 standard drinks of alcohol     Types: 14 Cans of beer per week     Comment: 2 per day   • Drug use: Never   • Sexual activity: Not Currently     Partners:  Female     Birth control/protection: Post-menopausal   Other Topics Concern   • Not on file   Social History Narrative   • Not on file     Social Determinants of Health     Financial Resource Strain: Not on file   Food Insecurity: Not on file   Transportation Needs: Not on file   Physical Activity: Not on file   Stress: No Stress Concern Present (12/23/2022)    [employer] - Occupational Stress Questionnaire    • Feeling of Stress : Not at all   Social Connections: Not on file   Intimate Partner Violence: Not on file   Housing Stability: Not on file       Current Outpatient Medications:   •  calcium carbonate (OS-CARTER) 600 MG tablet, Take 600 mg by mouth daily, Disp: , Rfl:   •  Cholecalciferol (VITAMIN D3) 1000 units CAPS, Take by mouth in the morning, Disp: , Rfl:   •  COLLAGEN PO, Take 5,000 mcg by mouth daily, Disp: , Rfl:   •  folic acid (FOLVITE) 1 mg tablet, Take by mouth daily, Disp: , Rfl:   No Known Allergies    Physical Exam:     Vitals:    02/01/24 1541   BP: 132/88   Pulse: 92   SpO2: 97%     Physical Exam  Constitutional:       Appearance: Normal appearance.   HENT:      Head: Normocephalic and atraumatic.      Nose: Nose normal.      Mouth/Throat:      Mouth: Mucous membranes are moist.   Eyes:      Pupils: Pupils are equal, round, and reactive to light.   Cardiovascular:      Rate and Rhythm: Normal rate.      Pulses: Normal pulses.      Heart sounds: Normal heart sounds.   Pulmonary:      Effort: Pulmonary effort is normal.      Breath sounds: Normal breath sounds.   Chest:          Comments: Right breast examination no palpable mass masses nipple discharge nipple retraction or skin changes.  Right breast incision is healing well.  Abdominal:      General: Bowel sounds are normal.      Palpations: Abdomen is soft.   Musculoskeletal:         General: Normal range of motion.      Cervical back: Normal range of motion and neck supple.   Skin:     General: Skin is warm.    Neurological:      General: No focal deficit present.      Mental Status: She is alert and oriented to person, place, and time.   Psychiatric:         Mood and Affect: Mood normal.         Behavior: Behavior normal.         Thought Content: Thought content normal.         Judgment: Judgment normal.       Results & Discussion:   A.  Right breast, [facility]  directed lumpectomy:     - Usual ductal hyperplasia (UDH).     - Columnar cell change.     - Intraductal papilloma.     - Sclerosing adenosis.     - Negative for atypical proliferative lesions, in situ carcinoma and invasive carcinoma.     - Skin without significant pathologic abnormalities.     - Unremarkable breast tissue present at margins of resection.  I did review pathology in detail I did discussed in detail nature of breast disorders including papilloma as well as malignancies.  She will continue her self breast examination and annual mammogram.  she understands and  agrees . All patient questions were answered.       Advance Care Planning/Advance Directives:  I Tj Seo MD discussed the disease status with Alicia richardson 02/01/24  treatment plans and follow-up with the patient.

## 2024-02-02 ENCOUNTER — TELEPHONE (OUTPATIENT)
Dept: HEMATOLOGY ONCOLOGY | Facility: CLINIC | Age: 69
End: 2024-02-02

## 2024-02-02 NOTE — TELEPHONE ENCOUNTER
Patient Call    Who are you speaking with? Patient    If it is not the patient, are they listed on an active communication consent form? N/A   What is the reason for this call? She asked for a work note for her visit yesterday to be put in her mychart. She says she needs it today   Does this require a call back? N/A   If a call back is required, please list best call back number N/a   If a call back is required, advise that a message will be forwarded to their care team and someone will return their call as soon as possible.   Did you relay this information to the patient? N/A

## 2024-02-09 ENCOUNTER — APPOINTMENT (OUTPATIENT)
Age: 69
End: 2024-02-09
Payer: MEDICARE

## 2024-02-09 DIAGNOSIS — F41.1 GENERALIZED ANXIETY DISORDER: ICD-10-CM

## 2024-02-09 DIAGNOSIS — E55.9 INADEQUATE VITAMIN D AND VITAMIN D DERIVATIVE INTAKE: ICD-10-CM

## 2024-02-09 DIAGNOSIS — E78.00 PURE HYPERCHOLESTEROLEMIA: ICD-10-CM

## 2024-02-09 DIAGNOSIS — E55.9 VITAMIN D INSUFFICIENCY: ICD-10-CM

## 2024-02-09 DIAGNOSIS — R31.29 OTHER MICROSCOPIC HEMATURIA: ICD-10-CM

## 2024-02-09 DIAGNOSIS — N18.2 CKD (CHRONIC KIDNEY DISEASE) STAGE 2, GFR 60-89 ML/MIN: ICD-10-CM

## 2024-02-09 LAB
25(OH)D3 SERPL-MCNC: 70.4 NG/ML (ref 30–100)
BACTERIA UR QL AUTO: ABNORMAL /HPF
BILIRUB UR QL STRIP: NEGATIVE
CHOLEST SERPL-MCNC: 229 MG/DL
CLARITY UR: ABNORMAL
COLOR UR: YELLOW
GLUCOSE UR STRIP-MCNC: NEGATIVE MG/DL
HDLC SERPL-MCNC: 76 MG/DL
HGB UR QL STRIP.AUTO: ABNORMAL
KETONES UR STRIP-MCNC: NEGATIVE MG/DL
LDLC SERPL CALC-MCNC: 134 MG/DL (ref 0–100)
LEUKOCYTE ESTERASE UR QL STRIP: ABNORMAL
MUCOUS THREADS UR QL AUTO: ABNORMAL
NITRITE UR QL STRIP: NEGATIVE
NON-SQ EPI CELLS URNS QL MICRO: ABNORMAL /HPF
PH UR STRIP.AUTO: 6 [PH]
PROT UR STRIP-MCNC: ABNORMAL MG/DL
RBC #/AREA URNS AUTO: ABNORMAL /HPF
SP GR UR STRIP.AUTO: 1.02 (ref 1–1.03)
TRIGL SERPL-MCNC: 97 MG/DL
TSH SERPL DL<=0.05 MIU/L-ACNC: 2.07 UIU/ML (ref 0.45–4.5)
UROBILINOGEN UR STRIP-ACNC: <2 MG/DL
WBC #/AREA URNS AUTO: ABNORMAL /HPF

## 2024-02-09 PROCEDURE — 80061 LIPID PANEL: CPT

## 2024-02-09 PROCEDURE — 36415 COLL VENOUS BLD VENIPUNCTURE: CPT

## 2024-02-09 PROCEDURE — 81001 URINALYSIS AUTO W/SCOPE: CPT

## 2024-02-09 PROCEDURE — 84443 ASSAY THYROID STIM HORMONE: CPT

## 2024-02-09 PROCEDURE — 82306 VITAMIN D 25 HYDROXY: CPT

## 2024-02-12 ENCOUNTER — RA CDI HCC (OUTPATIENT)
Dept: OTHER | Facility: HOSPITAL | Age: 69
End: 2024-02-12

## 2024-02-16 ENCOUNTER — OFFICE VISIT (OUTPATIENT)
Age: 69
End: 2024-02-16
Payer: MEDICARE

## 2024-02-16 VITALS
BODY MASS INDEX: 21.61 KG/M2 | DIASTOLIC BLOOD PRESSURE: 80 MMHG | HEART RATE: 79 BPM | TEMPERATURE: 97.8 F | HEIGHT: 63 IN | SYSTOLIC BLOOD PRESSURE: 120 MMHG | OXYGEN SATURATION: 98 %

## 2024-02-16 DIAGNOSIS — Z00.00 MEDICARE ANNUAL WELLNESS VISIT, SUBSEQUENT: Primary | ICD-10-CM

## 2024-02-16 DIAGNOSIS — E78.00 PURE HYPERCHOLESTEROLEMIA: ICD-10-CM

## 2024-02-16 DIAGNOSIS — N18.2 CKD (CHRONIC KIDNEY DISEASE) STAGE 2, GFR 60-89 ML/MIN: ICD-10-CM

## 2024-02-16 DIAGNOSIS — F41.1 GENERALIZED ANXIETY DISORDER: ICD-10-CM

## 2024-02-16 DIAGNOSIS — Z78.0 ASYMPTOMATIC UNCOMPLICATED MENOPAUSE: ICD-10-CM

## 2024-02-16 PROBLEM — R31.29 OTHER MICROSCOPIC HEMATURIA: Status: RESOLVED | Noted: 2021-01-28 | Resolved: 2024-02-16

## 2024-02-16 PROCEDURE — 99214 OFFICE O/P EST MOD 30 MIN: CPT | Performed by: FAMILY MEDICINE

## 2024-02-16 PROCEDURE — G0438 PPPS, INITIAL VISIT: HCPCS | Performed by: FAMILY MEDICINE

## 2024-02-16 NOTE — ASSESSMENT & PLAN NOTE
Lab Results   Component Value Date    CHOLESTEROL 229 (H) 02/09/2024    CHOLESTEROL 238 (H) 12/23/2022    CHOLESTEROL 219 (H) 11/22/2021     Lab Results   Component Value Date    HDL 76 02/09/2024    HDL 82 12/23/2022    HDL 79 11/22/2021     Lab Results   Component Value Date    TRIG 97 02/09/2024    TRIG 93 12/23/2022    TRIG 91 11/22/2021     Lab Results   Component Value Date    LDLCALC 134 (H) 02/09/2024       Currently not on statin.   Reviewed lipid panel   Triglycerides/HDL ratio: 0.43923. Goal <2, 02/09/2024   Discussed importance of nutritional intake, exercising regularly.    Decrease calorie intake as discussed  Recommend low-carb diet  Exercise regularly as discussed  In addition to diet supplement omega-3 with fish oil as discussed.  Monitor lipid panel in 6-12 months.

## 2024-02-16 NOTE — PROGRESS NOTES
Assessment and Plan:     Problem List Items Addressed This Visit          Genitourinary    CKD (chronic kidney disease) stage 2, GFR 60-89 ml/min     Lab Results   Component Value Date    EGFR 67 12/08/2023    EGFR 69 12/23/2022    EGFR 68 11/22/2021    CREATININE 0.88 12/08/2023    CREATININE 0.86 12/23/2022    CREATININE 0.88 11/22/2021       Improve lifestyle and dietary changes to slow its progression  Labs reviewed  Improve lifestyle and dietary changes to slow its progression  Low-sodium, low-carb diet, limit snacking, exercise regularly.  Avoid nephrotoxic agents including over-the-counter NSAIDs as discussed  Hydrate well with water.                Other    Pure hypercholesterolemia     Lab Results   Component Value Date    CHOLESTEROL 229 (H) 02/09/2024    CHOLESTEROL 238 (H) 12/23/2022    CHOLESTEROL 219 (H) 11/22/2021     Lab Results   Component Value Date    HDL 76 02/09/2024    HDL 82 12/23/2022    HDL 79 11/22/2021     Lab Results   Component Value Date    TRIG 97 02/09/2024    TRIG 93 12/23/2022    TRIG 91 11/22/2021     Lab Results   Component Value Date    LDLCALC 134 (H) 02/09/2024       Currently not on statin.   Reviewed lipid panel   Triglycerides/HDL ratio: 0.04796. Goal <2, 02/09/2024   Discussed importance of nutritional intake, exercising regularly.    Decrease calorie intake as discussed  Recommend low-carb diet  Exercise regularly as discussed  In addition to diet supplement omega-3 with fish oil as discussed.  Monitor lipid panel in 6-12 months.           Generalized anxiety disorder     Struggling with anxiety.  Does not want to take any anxiolytics  Interested in talk therapy.      Referred to talk therapy.            Relevant Orders    Ambulatory referral to Psych Services    Medicare annual wellness visit, subsequent - Primary     Today we discussed patient's overall health including the following:  Previous blood work results, necessary blood work recommended to be done  BMI,  nutritional intake, exercise, lifestyle changes  Screenings  Supplements  Medical conditions as listed  Needs repeat colonoscopy   Reviewed lab results including urine analysis and all questions answered.            Other Visit Diagnoses       Asymptomatic uncomplicated menopause                Depression Screening and Follow-up Plan: Patient was screened for depression during today's encounter. They screened negative with a PHQ-2 score of 0.      Preventive health issues were discussed with patient, and age appropriate screening tests were ordered as noted in patient's After Visit Summary.  Personalized health advice and appropriate referrals for health education or preventive services given if needed, as noted in patient's After Visit Summary.     History of Present Illness:     Patient presents for a Medicare Wellness Visit    HPI   Patient Care Team:  Russel Meyer MD as PCP - General (Family Medicine)  Obed Laws MD as PCP - PCP-[facility]  Obed Laws MD as PCP - [facility]  MD Ab Kohli DO (Gastroenterology)  Tj Seo MD as Surgeon (Surgical Oncology)  Russel Meyer MD (Family Medicine)     Review of Systems:     Review of Systems   Constitutional:  Negative for chills and fever.        Problem List:     Patient Active Problem List   Diagnosis    Bile duct abnormality    Tubular adenoma of colon    Seborrheic keratosis    Pure hypercholesterolemia    Osteoporosis without current pathological fracture    Primary osteoarthritis    Colon polyp    Asymptomatic postmenopausal status    Atrophic vaginitis    Pseudoangiomatous stromal hyperplasia of breast    Intraductal papilloma of breast, right    CKD (chronic kidney disease) stage 2, GFR 60-89 ml/min    Generalized anxiety disorder    Medicare annual wellness visit, subsequent      Past Medical and Surgical History:     Past Medical History:   Diagnosis Date    Colon polyp     Medical  history reviewed with no changes     Osteoporosis     Thyroglossal cyst      Past Surgical History:   Procedure Laterality Date    BREAST BIOPSY Left 2018    benign    CATARACT EXTRACTION      COLONOSCOPY      2017    CYSTOSCOPY  2021    Dr. Jeannette Hadley     DENTAL IMPLANT      EGD  2022    [place] MASTECTOMY PARTIAL Right 2024    Procedure: RIGHT BREAST SAIGE  DIRECTED LUMPECTOMY;  Surgeon: Tj Seo MD;  Location: [facility];  Service: Surgical Oncology    THROAT SURGERY      UPPER GASTROINTESTINAL ENDOSCOPY  2021    Bile duct abnormality    US BREAST SAIGE  NEEDLE LOC RIGHT Right 2024    US GUIDED BREAST BIOPSY RIGHT COMPLETE Right 2023      Family History:     Family History   Problem Relation Age of Onset    Dementia Mother     Diabetes Father     No Known Problems Maternal Aunt     No Known Problems Maternal Aunt     No Known Problems Maternal Aunt     Breast cancer Cousin 45    Breast cancer Cousin 45    Colon cancer Neg Hx     Ovarian cancer Neg Hx     Uterine cancer Neg Hx     Cervical cancer Neg Hx       Social History:     Social History     Socioeconomic History    Marital status:      Spouse name: None    Number of children: None    Years of education: None    Highest education level: None   Occupational History    None   Tobacco Use    Smoking status: Former     Current packs/day: 0.00     Average packs/day: 0.3 packs/day for 33.2 years (8.3 ttl pk-yrs)     Types: Cigarettes     Start date: 3/17/1971     Quit date: 3/17/2004     Years since quittin.9    Smokeless tobacco: Never    Tobacco comments:     quit    Vaping Use    Vaping status: Never Used   Substance and Sexual Activity    Alcohol use: Yes     Alcohol/week: 14.0 standard drinks of alcohol     Types: 14 Cans of beer per week     Comment: 2 per day    Drug use: Never    Sexual activity: Not Currently     Partners: Female     Birth control/protection: Post-menopausal    Other Topics Concern    None   Social History Narrative    None     Social Determinants of Health     Financial Resource Strain: Low Risk  (2/16/2024)    Overall Financial Resource Strain (CARDIA)     Difficulty of Paying Living Expenses: Not hard at all   Food Insecurity: Not on file   Transportation Needs: No Transportation Needs (2/16/2024)    PRAPARE - Transportation     Lack of Transportation (Medical): No     Lack of Transportation (Non-Medical): No   Physical Activity: Not on file   Stress: No Stress Concern Present (12/23/2022)    [employer] - Occupational Stress Questionnaire     Feeling of Stress : Not at all   Social Connections: Not on file   Intimate Partner Violence: Not on file   Housing Stability: Not on file      Medications and Allergies:     Current Outpatient Medications   Medication Sig Dispense Refill    calcium carbonate (OS-CARTER) 600 MG tablet Take 600 mg by mouth daily      Cholecalciferol (VITAMIN D3) 1000 units CAPS Take by mouth in the morning      COLLAGEN PO Take 5,000 mcg by mouth daily      folic acid (FOLVITE) 1 mg tablet Take by mouth daily       No current facility-administered medications for this visit.     No Known Allergies   Immunizations:     Immunization History   Administered Date(s) Administered    INFLUENZA 09/30/2014    Pneumococcal Conjugate 13-Valent 08/01/2019    Td (adult), adsorbed 05/28/2002    Tdap 02/14/2013      Health Maintenance:         Topic Date Due    Breast Cancer Screening: Mammogram  11/29/2024    Colorectal Cancer Screening  07/13/2025    Hepatitis C Screening  Completed         Topic Date Due    Pneumococcal Vaccine: 65+ Years (2 of 2 - PPSV23 or PCV20) 08/01/2020    Influenza Vaccine (1) 09/01/2023    COVID-19 Vaccine (1 - 2023-24 season) Never done      Medicare Screening Tests and Risk Assessments:     Alicia is here for her Subsequent Wellness visit.     Health Risk Assessment:   Patient rates overall health as  very good. Patient feels that their physical health rating is same. Patient is satisfied with their life. Eyesight was rated as same. Hearing was rated as slightly worse. Patient feels that their emotional and mental health rating is slightly worse. Patients states they are sometimes angry. Patient states they are sometimes unusually tired/fatigued. Pain experienced in the last 7 days has been some. Patient's pain rating has been 1/10. Patient states that she has experienced no weight loss or gain in last 6 months.     Depression Screening:   PHQ-2 Score: 0      Fall Risk Screening:   In the past year, patient has experienced: no history of falling in past year      Urinary Incontinence Screening:   Patient has not leaked urine accidently in the last six months.     Home Safety:  Patient does not have trouble with stairs inside or outside of their home. Patient has working smoke alarms and has no working carbon monoxide detector. Home safety hazards include: none.     Nutrition:   Current diet is Regular.     Medications:   Patient is currently taking over-the-counter supplements. OTC medications include: see medication list. Patient is able to manage medications.     Activities of Daily Living (ADLs)/Instrumental Activities of Daily Living (IADLs):   Walk and transfer into and out of bed and chair?: Yes  Dress and groom yourself?: Yes    Bathe or shower yourself?: Yes    Feed yourself? Yes  Do your laundry/housekeeping?: Yes  Manage your money, pay your bills and track your expenses?: Yes  Make your own meals?: Yes    Do your own shopping?: Yes    Previous Hospitalizations:   Any hospitalizations or ED visits within the last 12 months?: No      Advance Care Planning:   Living will: Yes    Advanced directive: Yes      PREVENTIVE SCREENINGS      Cardiovascular Screening:    General: Screening Not Indicated and History Lipid Disorder      Diabetes Screening:     General: Screening Current      Colorectal Cancer  "Screening:     General: Screening Current    Due for: Cologuard      Breast Cancer Screening:     General: Screening Current      Cervical Cancer Screening:    General: Screening Not Indicated      Osteoporosis Screening:    General: Screening Not Indicated and History Osteoporosis      Abdominal Aortic Aneurysm (AAA) Screening:        General: Screening Not Indicated      Lung Cancer Screening:     General: Screening Not Indicated      Hepatitis C Screening:    General: Screening Current    Screening, Brief Intervention, and Referral to Treatment (SBIRT)    Screening  Typical number of drinks in a day: 2  Typical number of drinks in a week: 14  Interpretation: Low risk drinking behavior.    Single Item Drug Screening:  How often have you used an illegal drug (including marijuana) or a prescription medication for non-medical reasons in the past year? never    Single Item Drug Screen Score: 0  Interpretation: Negative screen for possible drug use disorder    Other Counseling Topics:   Skin self-exam, sunscreen and calcium and vitamin D intake and regular weightbearing exercise.     No results found.     Physical Exam:     /80 (BP Location: Right arm, Patient Position: Sitting, Cuff Size: Standard)   Pulse 79   Temp 97.8 °F (36.6 °C) (Tympanic)   Ht 5' 3\" (1.6 m)   LMP  (LMP Unknown)   SpO2 98%   BMI 21.61 kg/m²     Physical Exam  Vitals reviewed.   Constitutional:       General: She is not in acute distress.     Appearance: Normal appearance. She is not ill-appearing, toxic-appearing or diaphoretic.   HENT:      Head: Normocephalic and atraumatic.      Right Ear: External ear normal.      Left Ear: External ear normal.      Nose: Nose normal.      Mouth/Throat:      Mouth: Mucous membranes are moist.   Eyes:      General: No scleral icterus.        Right eye: No discharge.         Left eye: No discharge.      Conjunctiva/sclera: Conjunctivae normal.   Cardiovascular:      Rate and Rhythm: Normal rate. "   Pulmonary:      Effort: Pulmonary effort is normal. No respiratory distress.   Skin:     General: Skin is warm.   Neurological:      General: No focal deficit present.      Mental Status: She is alert and oriented to person, place, and time.   Psychiatric:         Mood and Affect: Mood normal.         Behavior: Behavior normal.         Thought Content: Thought content normal.          Russel Meyer MD

## 2024-02-16 NOTE — ASSESSMENT & PLAN NOTE
Struggling with anxiety.  Does not want to take any anxiolytics  Interested in talk therapy.      Referred to talk therapy.

## 2024-02-16 NOTE — PATIENT INSTRUCTIONS
Start vitamin K2 100-200 mcg daily, preventative measures for vascular calcification.    Medicare Preventive Visit Patient Instructions  Thank you for completing your Welcome to Medicare Visit or Medicare Annual Wellness Visit today. Your next wellness visit will be due in one year (2/16/2025).  The screening/preventive services that you may require over the next 5-10 years are detailed below. Some tests may not apply to you based off risk factors and/or age. Screening tests ordered at today's visit but not completed yet may show as past due. Also, please note that scanned in results may not display below.  Preventive Screenings:  Service Recommendations Previous Testing/Comments   Colorectal Cancer Screening  * Colonoscopy    * Fecal Occult Blood Test (FOBT)/Fecal Immunochemical Test (FIT)  * Fecal DNA/Cologuard Test  * Flexible Sigmoidoscopy Age: 45-75 years old   Colonoscopy: every 10 years (may be performed more frequently if at higher risk)  OR  FOBT/FIT: every 1 year  OR  Cologuard: every 3 years  OR  Sigmoidoscopy: every 5 years  Screening may be recommended earlier than age 45 if at higher risk for colorectal cancer. Also, an individualized decision between you and your healthcare provider will decide whether screening between the ages of 76-85 would be appropriate. Colonoscopy: 07/13/2020  FOBT/FIT: Not on file  Cologuard: Not on file  Sigmoidoscopy: 07/13/2020    Screening Current     Breast Cancer Screening Age: 40+ years old  Frequency: every 1-2 years  Not required if history of left and right mastectomy Mammogram: 11/29/2023    Screening Current   Cervical Cancer Screening Between the ages of 21-29, pap smear recommended once every 3 years.   Between the ages of 30-65, can perform pap smear with HPV co-testing every 5 years.   Recommendations may differ for women with a history of total hysterectomy, cervical cancer, or abnormal pap smears in past. Pap Smear: 11/01/2022    Screening Not Indicated    Hepatitis C Screening Once for adults born between 1945 and 1965  More frequently in patients at high risk for Hepatitis C Hep C Antibody: 11/13/2020    Screening Current   Diabetes Screening 1-2 times per year if you're at risk for diabetes or have pre-diabetes Fasting glucose: 95 mg/dL (12/23/2022)  A1C: No results in last 5 years (No results in last 5 years)  Screening Current   Cholesterol Screening Once every 5 years if you don't have a lipid disorder. May order more often based on risk factors. Lipid panel: 02/09/2024    Screening Not Indicated  History Lipid Disorder     Other Preventive Screenings Covered by Medicare:  Abdominal Aortic Aneurysm (AAA) Screening: covered once if your at risk. You're considered to be at risk if you have a family history of AAA.  Lung Cancer Screening: covers low dose CT scan once per year if you meet all of the following conditions: (1) Age 55-77; (2) No signs or symptoms of lung cancer; (3) Current smoker or have quit smoking within the last 15 years; (4) You have a tobacco smoking history of at least 20 pack years (packs per day multiplied by number of years you smoked); (5) You get a written order from a healthcare provider.  Glaucoma Screening: covered annually if you're considered high risk: (1) You have diabetes OR (2) Family history of glaucoma OR (3)  aged 50 and older OR (4)  American aged 65 and older  Osteoporosis Screening: covered every 2 years if you meet one of the following conditions: (1) You're estrogen deficient and at risk for osteoporosis based off medical history and other findings; (2) Have a vertebral abnormality; (3) On glucocorticoid therapy for more than 3 months; (4) Have primary hyperparathyroidism; (5) On osteoporosis medications and need to assess response to drug therapy.   Last bone density test (DXA Scan): 10/18/2022.  HIV Screening: covered annually if you're between the age of 15-65. Also covered annually if you are  younger than 15 and older than 65 with risk factors for HIV infection. For pregnant patients, it is covered up to 3 times per pregnancy.    Immunizations:  Immunization Recommendations   Influenza Vaccine Annual influenza vaccination during flu season is recommended for all persons aged >= 6 months who do not have contraindications   Pneumococcal Vaccine   * Pneumococcal conjugate vaccine = PCV13 (Prevnar 13), PCV15 (Vaxneuvance), PCV20 (Prevnar 20)  * Pneumococcal polysaccharide vaccine = PPSV23 (Pneumovax) Adults 19-65 yo with certain risk factors or if 65+ yo  If never received any pneumonia vaccine: recommend Prevnar 20 (PCV20)  Give PCV20 if previously received 1 dose of PCV13 or PPSV23   Hepatitis B Vaccine 3 dose series if at intermediate or high risk (ex: diabetes, end stage renal disease, liver disease)   Respiratory syncytial virus (RSV) Vaccine - COVERED BY MEDICARE PART D  * RSVPreF3 (Arexvy) CDC recommends that adults 60 years of age and older may receive a single dose of RSV vaccine using shared clinical decision-making (SCDM)   Tetanus (Td) Vaccine - COST NOT COVERED BY MEDICARE PART B Following completion of primary series, a booster dose should be given every 10 years to maintain immunity against tetanus. Td may also be given as tetanus wound prophylaxis.   Tdap Vaccine - COST NOT COVERED BY MEDICARE PART B Recommended at least once for all adults. For pregnant patients, recommended with each pregnancy.   Shingles Vaccine (Shingrix) - COST NOT COVERED BY MEDICARE PART B  2 shot series recommended in those 19 years and older who have or will have weakened immune systems or those 50 years and older     Health Maintenance Due:      Topic Date Due    Breast Cancer Screening: Mammogram  11/29/2024    Colorectal Cancer Screening  07/13/2025    Hepatitis C Screening  Completed     Immunizations Due:      Topic Date Due    Pneumococcal Vaccine: 65+ Years (2 of 2 - PPSV23 or PCV20) 08/01/2020    Influenza  Vaccine (1) 09/01/2023    COVID-19 Vaccine (1 - 2023-24 season) Never done     Advance Directives   What are advance directives?  Advance directives are legal documents that state your wishes and plans for medical care. These plans are made ahead of time in case you lose your ability to make decisions for yourself. Advance directives can apply to any medical decision, such as the treatments you want, and if you want to donate organs.   What are the types of advance directives?  There are many types of advance directives, and each state has rules about how to use them. You may choose a combination of any of the following:  Living will:  This is a written record of the treatment you want. You can also choose which treatments you do not want, which to limit, and which to stop at a certain time. This includes surgery, medicine, IV fluid, and tube feedings.   Durable power of  for healthcare (DPAHC):  This is a written record that states who you want to make healthcare choices for you when you are unable to make them for yourself. This person, called a proxy, is usually a family member or a friend. You may choose more than 1 proxy.  Do not resuscitate (DNR) order:  A DNR order is used in case your heart stops beating or you stop breathing. It is a request not to have certain forms of treatment, such as CPR. A DNR order may be included in other types of advance directives.  Medical directive:  This covers the care that you want if you are in a coma, near death, or unable to make decisions for yourself. You can list the treatments you want for each condition. Treatment may include pain medicine, surgery, blood transfusions, dialysis, IV or tube feedings, and a ventilator (breathing machine).  Values history:  This document has questions about your views, beliefs, and how you feel and think about life. This information can help others choose the care that you would choose.  Why are advance directives important?  An  advance directive helps you control your care. Although spoken wishes may be used, it is better to have your wishes written down. Spoken wishes can be misunderstood, or not followed. Treatments may be given even if you do not want them. An advance directive may make it easier for your family to make difficult choices about your care.       © Copyright [employer] 2018 Information is for End User's use only and may not be sold, redistributed or otherwise used for commercial purposes. All illustrations and images included in CareNotes® are the copyrighted property of A.D.A.M., Inc. or [employer]

## 2024-02-16 NOTE — ASSESSMENT & PLAN NOTE
Today we discussed patient's overall health including the following:  Previous blood work results, necessary blood work recommended to be done  BMI, nutritional intake, exercise, lifestyle changes  Screenings  Supplements  Medical conditions as listed  Needs repeat colonoscopy   Reviewed lab results including urine analysis and all questions answered.

## 2024-02-21 ENCOUNTER — TELEPHONE (OUTPATIENT)
Dept: PSYCHIATRY | Facility: CLINIC | Age: 69
End: 2024-02-21

## 2024-02-21 PROBLEM — Z00.00 MEDICARE ANNUAL WELLNESS VISIT, SUBSEQUENT: Status: RESOLVED | Noted: 2024-02-16 | Resolved: 2024-02-21

## 2024-02-21 NOTE — TELEPHONE ENCOUNTER
PSR reached out to patient in reference to a referral placed by PCP for integrated therapy. PSR verified patient's , Address and Insurance on file. Patient verified and confirmed. Patient is still interested. PSR scheduled patient with integrated therapist, Alicia Silvestre, for 3/11/2024 at 3:30pm. Patient confirmed that date and time is good. PSR stated [facility] appointment. Patient acknowledged and confirmed.

## 2024-02-23 ENCOUNTER — TELEPHONE (OUTPATIENT)
Age: 69
End: 2024-02-23

## 2024-02-23 ENCOUNTER — TELEPHONE (OUTPATIENT)
Dept: GASTROENTEROLOGY | Facility: CLINIC | Age: 69
End: 2024-02-23

## 2024-02-23 NOTE — TELEPHONE ENCOUNTER
"Called and spoke to patient. She usually goes for colonoscopies every 5 years. She is just concerned that she should go sooner, she stated that one of her BW test came back abnormal.... patients feels she should have one sooner.... and if she has one sooner it would need to be put on as \" high risk\" ... Please advise Thank you !   "

## 2024-02-23 NOTE — TELEPHONE ENCOUNTER
"Called patient back. She stated she thought it was BW but after looking through her paper work it was the Urinalysis that was abnormal.  She saw that under \" mucous threads\" it was high.... is concerned and would lo=patricia to have sooner colonoscopy. Please advise. Thank you !  "

## 2024-02-23 NOTE — TELEPHONE ENCOUNTER
Patients GI provider:  KASANDRA Choe    Number to return call: [phone]    Reason for call: Pt recall for her colonoscopy is  7/13/2025. Pt would like discuss the possibility  of getting a earlier date because of her health. Pt would like a call back regarding matter.    Scheduled procedure/appointment date if applicable:   06.24.2004

## 2024-03-11 ENCOUNTER — SOCIAL WORK (OUTPATIENT)
Age: 69
End: 2024-03-11
Payer: MEDICARE

## 2024-03-11 DIAGNOSIS — F41.1 GENERALIZED ANXIETY DISORDER: Primary | ICD-10-CM

## 2024-03-11 PROCEDURE — 90791 PSYCH DIAGNOSTIC EVALUATION: CPT | Performed by: SOCIAL WORKER

## 2024-03-11 NOTE — BH CRISIS PLAN
Client Name: Alicia Rai       Client YOB: 1955    [facility] Safety Plan    Creation Date: 3/11/24 Update Date: 3/10/25   Created By: Alicia Silvestre       Step 1: Warning Signs:   Warning Signs   Me feeling depressed and frustrated            Step 2: Internal Coping Strategies:   Internal Coping Strategies   clean   watch television            Step 3: People and social settings that provide distraction:   Name Contact Information   Maddy (Cousin in law) In cell phone    Places   maddy's house         Step 4: People whom I can ask for help during a crisis:    Name Contact Information    Maddy (cousin in law) In cell phone      Step 5: Professionals or agencies I can contact during a crisis:    Clinican/Agency Name Phone Emergency Contact    None      Local Emergency Department Emergency Department Phone Emergency Department Address    [phone] [address] [facility] [address]      Crisis Phone Numbers:   Suicide Prevention Lifeline: Call or Text  988 Crisis Text Line: Text HOME to 683-106   Please note: Some [facility] do not have a separate number for Child/Adolescent specific crisis. If your county is not listed under Child/Adolescent, please call the adult number for your county      Adult Crisis Numbers: Child/Adolescent Crisis Numbers   [facility]: [phone] [facility]: [phone]   [facility]: [phone] [facility]: [phone]   [facility]: [phone] [place]: [phone]   [facility]: [phone] [facility]: [phone]   [facility]: [phone]   [facility]: [phone]   [facility]: [phone]   [facility]: [phone] (daytime) [phone] (after hours, weekends, holidays)      Step 6: Making the environment safer (plan for lethal means safety):   Patient did not identify any lethal methods: Yes     Optional: What is most important to me and worth living  for?   My son     Karli Safety Plan. Renita Saini and Chay Bolton. Used with permission of the authors.

## 2024-03-11 NOTE — PSYCH
Behavioral Health Psychotherapy Assessment    Date of Initial Psychotherapy Assessment: 24  Referral Source: Dr. Meyer  Has a release of information been signed for the referral source? Yes    Preferred Name: Alicia Rai  Preferred Pronouns: She/her  YOB: 1955 Age: 68 y.o.  Sex assigned at birth: female   Gender Identity: Female  Race:   Preferred Language: English    Emergency Contact:  Full Name: Pee Rai  Relationship to Client: Son  Contact information: [phone]    Primary Care Physician:  Russel Meyer MD  [address] [place] [id number] [phone]  Has a release of information been signed? Yes    Physical Health History:  Past surgical procedures: Lumpectomy, Throid cyst removed, endoscopies  Do you have a history of any of the following: other has endoscopies for precancerous tissue  Do you have any mobility issues? No    Relevant Family History:  Betsey lives alone.  Her son lives in [place] and he moved out in July.  She had a fight with his girlfriend and he moved out with her.  Her mom  3 years ago as did her 2 brothers and all  in .  Her one brother who was 11 months younger than her when he was 64 and he had pulmonary issues, did not take care of himself.  Her other brother was 62 and he  of alcoholism.  She raised her son locally and she has been  since her son was 4.  Her divorce was amicable.     Presenting Problem (What brings you in?)  She is struggling with her relationship with her son.  Anxiety.  She says her son always had behavioral issues.  He has dominance issues.      Mental Health Advance Directive:  Do you currently have a Mental Health Advance Directive?no    Diagnosis:   Diagnosis ICD-10-CM Associated Orders   1. Generalized anxiety disorder  F41.1           Initial Assessment:     Current Mental Status:    Appearance: appropriate, casual and neat      Behavior/Manner: cooperative       Affect/Mood:  Anxious and euthymic    Speech:  Normal and talkative    Sleep:  Interrupted    Oriented to: oriented to self, oriented to place and oriented to time       Clinical Symptoms    Depression: yes      Anxiety: yes      Depression Symptoms: depressed mood, restlessness, social isolation, fatigue, indecision, sleep disturbance and irritable      Anxiety Symptoms: excessive worry, fatigues easily, irritable, nervous/anxious and restlessness      Have you ever been assaultive to others or the environment: No      Have you ever been self-injurious: No      Counseling History:  Previous Counseling or Treatment  (Mental Health or Drug & Alcohol): No    Have you previously taken psychiatric medications: No      Suicide Risk Assessment  Have you ever had a suicide attempt: No    Have you had incidents of suicidal ideation: No    Are you currently experiencing suicidal thoughts: No      Substance Abuse/Addiction Assessment:  Alcohol: Yes    Age of First Use:  18  Age of regular use:  20  Frequency:  Daily  Amount:  2 coors ligh  Last use:  03/10/24  Heroin: No    Fentanyl: No    Opiates: No    Cocaine: No    Amphetamines: No    Hallucinogens: No    Club Drugs: No    Benzodiazepines: No    Other Rx Meds: No    Marijuana: No    Tobacco/Nicotine: No    Have you experienced blackouts as a result of substance use: No    Have you had any periods of abstinence: No    Have you experienced symptoms of withdrawal: No    Have you ever overdosed on any substances?: No    Are you currently using any Medication Assisted Treatment for Substance Use: No      Compulsive Behaviors:  Compulsive Behavior Information:  Denies any compulsive behaviors    Disordered Eating History:  Do you have a history of disordered eating: No      Social Determinants of Health:    SDOH:  Social isolation and stress    Trauma and Abuse History:    Have you ever been abused: No      Legal History:    Have you ever been arrested  or had a DUI: No       Have you been incarcerated: No      Are you currently on parole/probation: No      Any current Children and Youth involvement: No      Any pending legal charges: No      Relationship History:    Current marital status:       Natural Supports:  Other    Other natural supports:  Cousin in law Maddy    Relationship History:   for 10 years and  amicably. 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    Future work goals:  To retire    Sources of income/financial support:  Work and [employer]     History:      Status: no history of  duty  Educational History:     Have you ever been diagnosed with a learning disability: No      Highest level of education:  High school graduate    Have you ever had an IEP or 504-plan: No      Do you need assistance with reading or writing: No      Recommended Treatment:     Psychotherapy:  Individual sessions    Frequency:  2 times    Session frequency:  Monthly    Visit start and stop times:    03/11/24  Start Time: 1531  Stop Time: 1627  Total Visit Time: 56 minutes

## 2024-03-11 NOTE — BH TREATMENT PLAN
"Outpatient Behavioral Health Psychotherapy Treatment Plan    Alicia Rai  1955     Date of Initial Psychotherapy Assessment: 03/11/2024   Date of Current Treatment Plan: 03/11/24  Treatment Plan Target Date: 09/10/24  Treatment Plan Expiration Date: 09/10/24    Diagnosis:   1. Generalized anxiety disorder            Area(s) of Need: Anxiety and not having a good relationship with her only child.      Long Term Goal 1 (in the client's own words): \"To give information and get an objective opinion.\"    Stage of Change: Preparation    Target Date for completion: 09/10/24     Anticipated therapeutic modalities: CBT, Mindfulness, Solution focused therapy     People identified to complete this goal: Therapist and Pat      Objective 1: (identify the means of measuring success in meeting the objective): Teach and learn and implement effective communication skills.       Objective 2: (identify the means of measuring success in meeting the objective): Lean and implement mindfulness skills in real life situations.      I am currently under the care of a [facility] psychiatric provider: no    My [facility] psychiatric provider is: N/A    I am currently taking psychiatric medications: No    I feel that I will be ready for discharge from mental health care when I reach the following (measurable goal/objective):  \"If I can get clarification on whether it was me or my son in this dysfunctional relationship.\"    For children and adults who have a legal guardian:   Has there been any change to custody orders and/or guardianship status? NA. If yes, attach updated documentation.    I have created my Crisis Plan and have been offered a copy of this plan    Behavioral Health Treatment Plan [facility]: Diagnosis and Treatment Plan explained to Alicia Rai acknowledges an understanding of their diagnosis. Alicia Rai agrees to this treatment plan.    I have been offered a copy of this Treatment Plan. " yes      Electronically Signed by Alicia Silvestre

## 2024-05-22 ENCOUNTER — APPOINTMENT (OUTPATIENT)
Age: 69
End: 2024-05-22
Payer: MEDICARE

## 2024-05-22 DIAGNOSIS — G57.82 NEUROPATHY, ILIOINGUINAL NERVE, LEFT: ICD-10-CM

## 2024-05-22 LAB
EST. AVERAGE GLUCOSE BLD GHB EST-MCNC: 114 MG/DL
HBA1C MFR BLD: 5.6 %

## 2024-05-22 PROCEDURE — 83036 HEMOGLOBIN GLYCOSYLATED A1C: CPT

## 2024-05-22 PROCEDURE — 36415 COLL VENOUS BLD VENIPUNCTURE: CPT

## 2024-06-04 ENCOUNTER — HOSPITAL ENCOUNTER (OUTPATIENT)
Dept: GASTROENTEROLOGY | Facility: HOSPITAL | Age: 69
Setting detail: OUTPATIENT SURGERY
Discharge: HOME | End: 2024-06-04
Attending: INTERNAL MEDICINE
Payer: MEDICARE

## 2024-06-04 ENCOUNTER — HOSPITAL ENCOUNTER (OUTPATIENT)
Dept: RADIOLOGY | Facility: HOSPITAL | Age: 69
Discharge: HOME | End: 2024-06-04
Payer: MEDICARE

## 2024-06-04 ENCOUNTER — ANESTHESIA (OUTPATIENT)
Dept: GASTROENTEROLOGY | Facility: HOSPITAL | Age: 69
End: 2024-06-04

## 2024-06-04 ENCOUNTER — ANESTHESIA EVENT (OUTPATIENT)
Dept: GASTROENTEROLOGY | Facility: HOSPITAL | Age: 69
End: 2024-06-04

## 2024-06-04 VITALS
OXYGEN SATURATION: 94 % | HEIGHT: 63 IN | BODY MASS INDEX: 21.62 KG/M2 | RESPIRATION RATE: 18 BRPM | TEMPERATURE: 96.7 F | HEART RATE: 80 BPM | SYSTOLIC BLOOD PRESSURE: 129 MMHG | WEIGHT: 122 LBS | DIASTOLIC BLOOD PRESSURE: 67 MMHG

## 2024-06-04 DIAGNOSIS — D13.5 AMPULLARY ADENOMA: ICD-10-CM

## 2024-06-04 DIAGNOSIS — K83.9 BILE DUCT ABNORMALITY: Primary | ICD-10-CM

## 2024-06-04 PROCEDURE — 43261 ENDO CHOLANGIOPANCREATOGRAPH: CPT | Performed by: INTERNAL MEDICINE

## 2024-06-04 PROCEDURE — 43278 ERCP LESION ABLATE W/DILATE: CPT | Performed by: INTERNAL MEDICINE

## 2024-06-04 PROCEDURE — 74328 X-RAY BILE DUCT ENDOSCOPY: CPT

## 2024-06-04 PROCEDURE — C1874 STENT, COATED/COV W/DEL SYS: HCPCS

## 2024-06-04 PROCEDURE — 43264 ERCP REMOVE DUCT CALCULI: CPT | Performed by: INTERNAL MEDICINE

## 2024-06-04 PROCEDURE — 43276 ERCP STENT EXCHANGE W/DILATE: CPT | Performed by: INTERNAL MEDICINE

## 2024-06-04 PROCEDURE — 43273 ENDOSCOPIC PANCREATOSCOPY: CPT | Performed by: INTERNAL MEDICINE

## 2024-06-04 PROCEDURE — 88305 TISSUE EXAM BY PATHOLOGIST: CPT | Performed by: PATHOLOGY

## 2024-06-04 PROCEDURE — 88360 TUMOR IMMUNOHISTOCHEM/MANUAL: CPT | Performed by: PATHOLOGY

## 2024-06-04 PROCEDURE — C2617 STENT, NON-COR, TEM W/O DEL: HCPCS

## 2024-06-04 PROCEDURE — 88342 IMHCHEM/IMCYTCHM 1ST ANTB: CPT | Performed by: PATHOLOGY

## 2024-06-04 PROCEDURE — C1769 GUIDE WIRE: HCPCS

## 2024-06-04 RX ORDER — ROCURONIUM BROMIDE 10 MG/ML
INJECTION, SOLUTION INTRAVENOUS AS NEEDED
Status: DISCONTINUED | OUTPATIENT
Start: 2024-06-04 | End: 2024-06-04

## 2024-06-04 RX ORDER — FENTANYL CITRATE 50 UG/ML
INJECTION, SOLUTION INTRAMUSCULAR; INTRAVENOUS AS NEEDED
Status: DISCONTINUED | OUTPATIENT
Start: 2024-06-04 | End: 2024-06-04

## 2024-06-04 RX ORDER — PHYTONADIONE 5 MG/1
5 TABLET ORAL ONCE
COMMUNITY

## 2024-06-04 RX ORDER — SODIUM CHLORIDE, SODIUM LACTATE, POTASSIUM CHLORIDE, CALCIUM CHLORIDE 600; 310; 30; 20 MG/100ML; MG/100ML; MG/100ML; MG/100ML
INJECTION, SOLUTION INTRAVENOUS CONTINUOUS PRN
Status: DISCONTINUED | OUTPATIENT
Start: 2024-06-04 | End: 2024-06-04

## 2024-06-04 RX ORDER — LIDOCAINE HYDROCHLORIDE 20 MG/ML
INJECTION, SOLUTION EPIDURAL; INFILTRATION; INTRACAUDAL; PERINEURAL AS NEEDED
Status: DISCONTINUED | OUTPATIENT
Start: 2024-06-04 | End: 2024-06-04

## 2024-06-04 RX ORDER — PROPOFOL 10 MG/ML
INJECTION, EMULSION INTRAVENOUS AS NEEDED
Status: DISCONTINUED | OUTPATIENT
Start: 2024-06-04 | End: 2024-06-04

## 2024-06-04 RX ORDER — ONDANSETRON 2 MG/ML
INJECTION INTRAMUSCULAR; INTRAVENOUS AS NEEDED
Status: DISCONTINUED | OUTPATIENT
Start: 2024-06-04 | End: 2024-06-04

## 2024-06-04 RX ORDER — DEXAMETHASONE SODIUM PHOSPHATE 10 MG/ML
INJECTION, SOLUTION INTRAMUSCULAR; INTRAVENOUS AS NEEDED
Status: DISCONTINUED | OUTPATIENT
Start: 2024-06-04 | End: 2024-06-04

## 2024-06-04 RX ADMIN — DEXAMETHASONE SODIUM PHOSPHATE 4 MG: 10 INJECTION, SOLUTION INTRAMUSCULAR; INTRAVENOUS at 09:42

## 2024-06-04 RX ADMIN — SODIUM CHLORIDE, SODIUM LACTATE, POTASSIUM CHLORIDE, AND CALCIUM CHLORIDE: .6; .31; .03; .02 INJECTION, SOLUTION INTRAVENOUS at 09:33

## 2024-06-04 RX ADMIN — PROPOFOL 50 MG: 10 INJECTION, EMULSION INTRAVENOUS at 09:39

## 2024-06-04 RX ADMIN — SUGAMMADEX 200 MG: 100 INJECTION, SOLUTION INTRAVENOUS at 10:34

## 2024-06-04 RX ADMIN — ONDANSETRON 4 MG: 2 INJECTION INTRAMUSCULAR; INTRAVENOUS at 09:46

## 2024-06-04 RX ADMIN — PROPOFOL 50 MG: 10 INJECTION, EMULSION INTRAVENOUS at 09:38

## 2024-06-04 RX ADMIN — LIDOCAINE HYDROCHLORIDE 60 MG: 20 INJECTION, SOLUTION EPIDURAL; INFILTRATION; INTRACAUDAL at 09:38

## 2024-06-04 RX ADMIN — ROCURONIUM BROMIDE 25 MG: 10 INJECTION, SOLUTION INTRAVENOUS at 09:40

## 2024-06-04 RX ADMIN — FENTANYL CITRATE 50 MCG: 50 INJECTION INTRAMUSCULAR; INTRAVENOUS at 09:38

## 2024-06-04 RX ADMIN — FENTANYL CITRATE 50 MCG: 50 INJECTION INTRAMUSCULAR; INTRAVENOUS at 09:46

## 2024-06-04 RX ADMIN — IOHEXOL 4 ML: 240 INJECTION, SOLUTION INTRATHECAL; INTRAVASCULAR; INTRAVENOUS; ORAL at 11:06

## 2024-06-04 NOTE — DISCHARGE INSTRUCTIONS
ERCP (Endoscopic Retrograde Cholangiopancreatography)   WHAT YOU NEED TO KNOW:   An ERCP (endoscopic retrograde cholangiopancreatography) is a procedure to examine the ducts of your pancreas or gallbladder. ERCP may also be used to open blocked ducts, or to diagnose problems with your pancreas or gallbladder. An endoscope (thin, flexible tube with a light) will be used for the procedure.        DISCHARGE INSTRUCTIONS:   Seek care immediately if:   You have sudden, severe abdominal pain.     You have problems swallowing.     You have a large amount of black, sticky bowel movements or blood in your bowel movements.     You have sudden trouble breathing.     You feel weak, lightheaded, or faint or your heart beats faster than normal for you.     Contact your healthcare provider if:   You have a fever and chills.      You have nausea or are vomiting.      Your abdomen is bloated or feels full and hard.     You have abdominal pain.    You have a large amount of black, sticky bowel movements or blood in your bowel movements.    You have not had a bowel movement for 3 days after your procedure.    You have rash or hives.    You lose your appetite, your skin feels itchy, and your skin turns yellow.    You have questions or concerns about your procedure.     Self-care:   ·      Rest when you feel it is needed. You may be drowsy for up to 24 hours after your procedure. Return to your daily activities as directed.      ·       Ask when you can eat regular foods. Healthy foods include fruits, vegetables, whole-grain breads, low-fat dairy products, beans, lean meats, and fish.     ·       Relieve a sore throat with ice chips, liquids, or lozenges as directed.     Follow up with your healthcare provider as directed: Write down your questions so you remember to ask them during your visits.      If you take a “blood thinner”, please review the specific instructions from your endoscopist about when you should resume it. These can be  found in the “Recommendation” and “Your Medication list” sections of this After Visit Summary.

## 2024-06-04 NOTE — ANESTHESIA POSTPROCEDURE EVALUATION
Post-Op Assessment Note    CV Status:  Stable    Pain management: adequate       Mental Status:  Alert and awake   Hydration Status:  Euvolemic   PONV Controlled:  Controlled   Airway Patency:  Patent     Post Op Vitals Reviewed: Yes    No anethesia notable event occurred.    Staff: Anesthesiologist, CRNA               BP   153/79   Temp      Pulse  80   Resp   14   SpO2   98

## 2024-06-04 NOTE — ANESTHESIA PREPROCEDURE EVALUATION
Procedure:  ERCP    S/p right breast lumpectomy    Placement Date: 12/13/23  -RW Placement Time: 1325  -RW Mask Ventilation: Ventilated by mask (1)  -RW Preoxygenated: Yes  -RW Technique: Video laryngoscopy;Stylet  -RW, elective VL Type: Cuffed;Oral;Straight  -RW Tube Size: 7 mm  -RW Laryngoscope: Sofia  -RW Blade Size: 3  -RW Location: Oral  -RW Grade View: 1  -RW Insertion: Atraumatic;No change in dentition  -RW Insertion attempts: 1  -RW Placement Verification: Auscultation;Cuff palpitation;End tidal CO2;Symmetrical chest wall movement  -RW Secured at (cm): 21  -RW Placed By: CRNA  -RW Cuff Leak: Yes  -RW Removal Date: 12/13/23  -RW Removal Time: 1424  -RW     Relevant Problems   CARDIO   (+) Pure hypercholesterolemia      /RENAL   (+) CKD (chronic kidney disease) stage 2, GFR 60-89 ml/min      MUSCULOSKELETAL   (+) Primary osteoarthritis      NEURO/PSYCH   (+) Generalized anxiety disorder        Physical Exam    Airway    Mallampati score: II  TM Distance: >3 FB  Neck ROM: full     Dental   No notable dental hx     Cardiovascular  Cardiovascular exam normal    Pulmonary  Pulmonary exam normal     Other Findings  post-pubertal.      Anesthesia Plan  ASA Score- 2     Anesthesia Type- general with ASA Monitors.         Additional Monitors:     Airway Plan: ETT.    Comment: Risks/benefits and alternatives discussed with patient including possible PONV, sore throat, damage to teeth/lips/gums/esophagus, and possibility of rare anesthetic and surgical emergencies including but not limited to heart attack, stroke, and/or death. All questions were answered.  .       Plan Factors-Exercise tolerance (METS): >4 METS.    Chart reviewed.   Existing labs reviewed. Patient summary reviewed.    Patient is not a current smoker.              Induction- intravenous.    Postoperative Plan- . Planned trial extubation    Perioperative Resuscitation Plan - Level 1 - Full Code.       Informed Consent- Anesthetic plan and risks  discussed with patient.  I personally reviewed this patient with the CRNA. Discussed and agreed on the Anesthesia Plan with the CRNA..

## 2024-06-04 NOTE — H&P
History and Physical - [facility]  Alicia Rai 69 y.o. female MRN: [id number]                  HPI: Alicia Rai is a 69 y.o. year old female who presents for ERCP      REVIEW OF SYSTEMS: Per the HPI, and otherwise unremarkable.    Historical Information   Past Medical History:   Diagnosis Date    Colon polyp     Medical history reviewed with no changes     Osteoporosis     Thyroglossal cyst      Past Surgical History:   Procedure Laterality Date    BREAST BIOPSY Left 2018    benign    CATARACT EXTRACTION      COLONOSCOPY      2017    CYSTOSCOPY  2021    Dr. Jeannette Hadley     DENTAL IMPLANT      EGD  2022    ME MASTECTOMY PARTIAL Right 2024    Procedure: RIGHT BREAST SAIGE  DIRECTED LUMPECTOMY;  Surgeon: Tj Seo MD;  Location: [facility];  Service: Surgical Oncology    THROAT SURGERY      UPPER GASTROINTESTINAL ENDOSCOPY  2021    Bile duct abnormality    US BREAST CLIP NEEDLE LOC RIGHT Right 2024    US GUIDED BREAST BIOPSY RIGHT COMPLETE Right 2023     Social History   Social History     Substance and Sexual Activity   Alcohol Use Yes    Alcohol/week: 14.0 standard drinks of alcohol    Types: 14 Cans of beer per week    Comment: 2 per day     Social History     Substance and Sexual Activity   Drug Use Never     Social History     Tobacco Use   Smoking Status Former    Current packs/day: 0.00    Average packs/day: 0.3 packs/day for 33.2 years (8.3 ttl pk-yrs)    Types: Cigarettes    Start date: 3/17/1971    Quit date: 3/17/2004    Years since quittin.2   Smokeless Tobacco Never   Tobacco Comments    quit      Family History   Problem Relation Age of Onset    Dementia Mother     Diabetes Father     No Known Problems Maternal Aunt     No Known Problems Maternal Aunt     No Known Problems Maternal Aunt     Breast cancer Cousin 45    Breast cancer Cousin 45    Colon cancer Neg Hx     Ovarian cancer Neg Hx     Uterine cancer Neg  "Hx     Cervical cancer Neg Hx        Meds/Allergies       Current Outpatient Medications:     phytonadione (MEPHYTON) 5 mg tablet    calcium carbonate (OS-CARTER) 600 MG tablet    Cholecalciferol (VITAMIN D3) 1000 units CAPS    COLLAGEN PO    folic acid (FOLVITE) 1 mg tablet    No Known Allergies    Objective     /94   Pulse 80   Temp 98.6 °F (37 °C) (Tympanic)   Resp 16   Ht 5' 3\" (1.6 m)   Wt 55.3 kg (122 lb)   LMP  (LMP Unknown)   SpO2 98%   BMI 21.61 kg/m²       PHYSICAL EXAM    Gen: NAD  Head: NCAT  CV: RRR  CHEST: Clear  ABD: soft, NT/ND  EXT: no edema      ASSESSMENT/PLAN:  This is a 69 y.o. year old female here for ERCP, and she is stable and optimized for her procedure.        "

## 2024-06-10 ENCOUNTER — TELEPHONE (OUTPATIENT)
Age: 69
End: 2024-06-10

## 2024-06-11 PROCEDURE — 88360 TUMOR IMMUNOHISTOCHEM/MANUAL: CPT | Performed by: PATHOLOGY

## 2024-06-11 PROCEDURE — 88305 TISSUE EXAM BY PATHOLOGIST: CPT | Performed by: PATHOLOGY

## 2024-06-11 PROCEDURE — 88342 IMHCHEM/IMCYTCHM 1ST ANTB: CPT | Performed by: PATHOLOGY

## 2024-07-08 ENCOUNTER — TELEPHONE (OUTPATIENT)
Age: 69
End: 2024-07-08

## 2024-07-08 ENCOUNTER — OFFICE VISIT (OUTPATIENT)
Dept: GASTROENTEROLOGY | Facility: CLINIC | Age: 69
End: 2024-07-08
Payer: MEDICARE

## 2024-07-08 ENCOUNTER — NURSE TRIAGE (OUTPATIENT)
Age: 69
End: 2024-07-08

## 2024-07-08 ENCOUNTER — APPOINTMENT (OUTPATIENT)
Age: 69
End: 2024-07-08
Payer: MEDICARE

## 2024-07-08 VITALS
WEIGHT: 117 LBS | TEMPERATURE: 98.1 F | HEART RATE: 72 BPM | HEIGHT: 64 IN | OXYGEN SATURATION: 98 % | DIASTOLIC BLOOD PRESSURE: 74 MMHG | BODY MASS INDEX: 19.97 KG/M2 | SYSTOLIC BLOOD PRESSURE: 130 MMHG

## 2024-07-08 DIAGNOSIS — Z86.010 HISTORY OF COLON POLYPS: ICD-10-CM

## 2024-07-08 DIAGNOSIS — K62.5 RECTAL BLEEDING: Primary | ICD-10-CM

## 2024-07-08 DIAGNOSIS — D13.5 AMPULLARY ADENOMA: ICD-10-CM

## 2024-07-08 DIAGNOSIS — K62.5 RECTAL BLEEDING: ICD-10-CM

## 2024-07-08 DIAGNOSIS — K57.90 DIVERTICULAR DISEASE: ICD-10-CM

## 2024-07-08 LAB
ALBUMIN SERPL BCG-MCNC: 4.2 G/DL (ref 3.5–5)
ALP SERPL-CCNC: 43 U/L (ref 34–104)
ALT SERPL W P-5'-P-CCNC: 18 U/L (ref 7–52)
ANION GAP SERPL CALCULATED.3IONS-SCNC: 10 MMOL/L (ref 4–13)
AST SERPL W P-5'-P-CCNC: 30 U/L (ref 13–39)
BASOPHILS # BLD AUTO: 0.04 THOUSANDS/ÂΜL (ref 0–0.1)
BASOPHILS NFR BLD AUTO: 0 % (ref 0–1)
BILIRUB SERPL-MCNC: 0.61 MG/DL (ref 0.2–1)
BUN SERPL-MCNC: 19 MG/DL (ref 5–25)
CALCIUM SERPL-MCNC: 10.1 MG/DL (ref 8.4–10.2)
CHLORIDE SERPL-SCNC: 102 MMOL/L (ref 96–108)
CO2 SERPL-SCNC: 27 MMOL/L (ref 21–32)
CREAT SERPL-MCNC: 0.89 MG/DL (ref 0.6–1.3)
EOSINOPHIL # BLD AUTO: 0.14 THOUSAND/ÂΜL (ref 0–0.61)
EOSINOPHIL NFR BLD AUTO: 1 % (ref 0–6)
ERYTHROCYTE [DISTWIDTH] IN BLOOD BY AUTOMATED COUNT: 13.4 % (ref 11.6–15.1)
GFR SERPL CREATININE-BSD FRML MDRD: 65 ML/MIN/1.73SQ M
GLUCOSE SERPL-MCNC: 95 MG/DL (ref 65–140)
HCT VFR BLD AUTO: 42.4 % (ref 34.8–46.1)
HGB BLD-MCNC: 13.6 G/DL (ref 11.5–15.4)
IMM GRANULOCYTES # BLD AUTO: 0.05 THOUSAND/UL (ref 0–0.2)
IMM GRANULOCYTES NFR BLD AUTO: 0 % (ref 0–2)
LYMPHOCYTES # BLD AUTO: 2.49 THOUSANDS/ÂΜL (ref 0.6–4.47)
LYMPHOCYTES NFR BLD AUTO: 19 % (ref 14–44)
MCH RBC QN AUTO: 29 PG (ref 26.8–34.3)
MCHC RBC AUTO-ENTMCNC: 32.1 G/DL (ref 31.4–37.4)
MCV RBC AUTO: 90 FL (ref 82–98)
MONOCYTES # BLD AUTO: 0.96 THOUSAND/ÂΜL (ref 0.17–1.22)
MONOCYTES NFR BLD AUTO: 7 % (ref 4–12)
NEUTROPHILS # BLD AUTO: 9.79 THOUSANDS/ÂΜL (ref 1.85–7.62)
NEUTS SEG NFR BLD AUTO: 73 % (ref 43–75)
NRBC BLD AUTO-RTO: 0 /100 WBCS
PLATELET # BLD AUTO: 297 THOUSANDS/UL (ref 149–390)
PMV BLD AUTO: 10.1 FL (ref 8.9–12.7)
POTASSIUM SERPL-SCNC: 3.6 MMOL/L (ref 3.5–5.3)
PROT SERPL-MCNC: 7.2 G/DL (ref 6.4–8.4)
RBC # BLD AUTO: 4.69 MILLION/UL (ref 3.81–5.12)
SODIUM SERPL-SCNC: 139 MMOL/L (ref 135–147)
WBC # BLD AUTO: 13.47 THOUSAND/UL (ref 4.31–10.16)

## 2024-07-08 PROCEDURE — 99214 OFFICE O/P EST MOD 30 MIN: CPT | Performed by: INTERNAL MEDICINE

## 2024-07-08 PROCEDURE — 80053 COMPREHEN METABOLIC PANEL: CPT

## 2024-07-08 PROCEDURE — 85025 COMPLETE CBC W/AUTO DIFF WBC: CPT

## 2024-07-08 PROCEDURE — 36415 COLL VENOUS BLD VENIPUNCTURE: CPT

## 2024-07-08 NOTE — TELEPHONE ENCOUNTER
"Last visit: 4/13/21  ERCP: 6/4/24    Patient calling d/t starting with rectal blood clots and cramping around 430 last night.  Ate chicken a week ago that was not cooked fully.  Saturday had burger and not cooked.  Patient states she woke up and felt she had to have bm but unable to.  Unable to eat dinner last night.  Was happening about every  12-15 minutes. then woke up with same about every 2 hours.    Bright red clots broken up about the size of the tip of the finger.  States has been stressed lately.  Offered patient appointment on 7/11 but patient declined, wants something today.  Patient also declined going to ED.  Patient wants to be seen today.        Reason for Disposition   MODERATE rectal bleeding (small blood clots, passing blood without stool, or toilet water turns red)    Answer Assessment - Initial Assessment Questions  1. APPEARANCE of BLOOD: \"What color is it?\" \"Is it passed separately, on the surface of the stool, or mixed in with the stool?\"       Bright red  2. AMOUNT: \"How much blood was passed?\"   Broken up about size of tip of finger.  Patient states about 1/2 cup.       3. FREQUENCY: \"How many times has blood been passed with the stools?\"       All night long  4. ONSET: \"When was the blood first seen in the stools?\" (Days or weeks)       Yesterday 430.   5. DIARRHEA: \"Is there also some diarrhea?\" If Yes, ask: \"How many diarrhea stools were passed in past 24 hours?\"       denies  6. CONSTIPATION: \"Do you have constipation?\" If Yes, ask: \"How bad is it?\"      denies  7. RECURRENT SYMPTOMS: \"Have you had blood in your stools before?\" If Yes, ask: \"When was the last time?\" and \"What happened that time?\"       denies  8. BLOOD THINNERS: \"Do you take any blood thinners?\" (e.g., Coumadin/warfarin, Pradaxa/dabigatran, aspirin)  denies     9. OTHER SYMPTOMS: \"Do you have any other symptoms?\"  (e.g., abdominal pain, vomiting, dizziness, fever)      cramping    Protocols used: Rectal " Bleeding-ADULT-[place]

## 2024-07-08 NOTE — TELEPHONE ENCOUNTER
SPOKE WITH PT, CALLING BACK TO F/U ON PREVIOUS TRIAGE. PT WITH CONTINUED BRBPR, DENIES SOB, DIZZINESS, LIGHTHEADEDNESS. DECLINES ER, OFFERED OFFICE VISIT 7/9/24, PT DECLINED.

## 2024-07-08 NOTE — PATIENT INSTRUCTIONS
Scheduled date of colonoscopy (as of today):7/15/24  Physician performing colonoscopy:May  Location of colonoscopy:[facility]  Bowel prep reviewed with patient:Sp/Miralax  Instructions reviewed with patient by: Jj jefferson  Clearances:  none

## 2024-07-08 NOTE — TELEPHONE ENCOUNTER
Pt calling because she wanted to schedule an appointment with Dr Ayoub for today. Advised pt he did not have availability today, but he did for tomorrow. Pt declined and stated she would call back

## 2024-07-09 NOTE — PROGRESS NOTES
[facility] - Outpatient Follow-up Note  Alicia Rai 69 y.o. female MRN: [id number]  Encounter: [id number]          ASSESSMENT AND PLAN:      1. Rectal bleeding  - Colonoscopy; Future  - CBC (Includes Diff/Plt) (Refl); Future    2. History of colon polyps  - Colonoscopy; Future    3. Diverticular disease    4.  Ampullary adenoma  -Status post ERCP on 6/4/2024 with removal of 7 Maltese double-pigtail plastic stent and a 10 mm x 4 cm fully covered metal stent, sweeping of the common bile duct, ablation of small papillary projection in the distal common bile duct, replacement of a 10 mm x 4 cm fully covered metal stent, and a 5 Maltese by 3 cm straight plastic stent placed in the pancreatic duct.  ______________________________________________________________________    SUBJECTIVE: Alicia comes the office today stating she had a single episode of a small amount of rectal bleeding which was bright red.  This occurred 1 day prior to this visit.  There is no family history for colon cancer for colon polyp other than an aunt with a history of colon polyp.  She denies heartburn, nausea, vomiting, abdominal pain, diarrhea, constipation.  She does have occasional pellet stools.  She occasionally experiences some watery stools.  I performed her last colonoscopy on 7/13/2020 which showed evidence of diverticulosis in the sigmoid colon, I single sessile polyp smaller than 5 mm in the proximal ascending colon.      REVIEW OF SYSTEMS IS OTHERWISE NEGATIVE.      Historical Information   Past Medical History:   Diagnosis Date    Colon polyp     Medical history reviewed with no changes     Osteoporosis     Thyroglossal cyst      Past Surgical History:   Procedure Laterality Date    BREAST BIOPSY Left 2018    benign    CATARACT EXTRACTION      COLONOSCOPY      2017    CYSTOSCOPY  07/06/2021    Dr. Jeannette Hadley     DENTAL IMPLANT      EGD  09/27/2022    ERCP  03/16/2021    Bile duct abnormality    CT  "MASTECTOMY PARTIAL Right 2024    Procedure: RIGHT BREAST SAIGE  DIRECTED LUMPECTOMY;  Surgeon: Tj Seo MD;  Location: [facility];  Service: Surgical Oncology    THROAT SURGERY      UPPER GASTROINTESTINAL ENDOSCOPY  2021    Bile duct abnormality    US BREAST CLIP NEEDLE LOC RIGHT Right 2024    US GUIDED BREAST BIOPSY RIGHT COMPLETE Right 2023     Social History   Social History     Substance and Sexual Activity   Alcohol Use Yes    Alcohol/week: 14.0 standard drinks of alcohol    Types: 14 Cans of beer per week    Comment: 2 per day.  I filled this info out for Dr Obed Laws!     Social History     Substance and Sexual Activity   Drug Use Never     Social History     Tobacco Use   Smoking Status Former    Current packs/day: 0.00    Average packs/day: 0.3 packs/day for 33.2 years (8.3 ttl pk-yrs)    Types: Cigarettes    Start date: 3/17/1971    Quit date: 3/17/2004    Years since quittin.3   Smokeless Tobacco Never   Tobacco Comments    quit      Family History   Problem Relation Age of Onset    Dementia Mother     Diabetes Father     No Known Problems Maternal Aunt     No Known Problems Maternal Aunt     No Known Problems Maternal Aunt     Breast cancer Cousin 45    Breast cancer Cousin 45    Colon cancer Neg Hx     Ovarian cancer Neg Hx     Uterine cancer Neg Hx     Cervical cancer Neg Hx        Meds/Allergies       Current Outpatient Medications:     calcium carbonate (OS-CARTER) 600 MG tablet    Cholecalciferol (VITAMIN D3) 1000 units CAPS    COLLAGEN PO    folic acid (FOLVITE) 1 mg tablet    VITAMIN K PO    No Known Allergies        Objective     Blood pressure 130/74, pulse 72, temperature 98.1 °F (36.7 °C), temperature source Tympanic, height 5' 4\" (1.626 m), weight 53.1 kg (117 lb), SpO2 98%, not currently breastfeeding. Body mass index is 20.08 kg/m².      PHYSICAL EXAM:      General Appearance:   Alert, cooperative, no distress   HEENT:   Normocephalic, " atraumatic, anicteric.     Neck:  Supple, symmetrical, trachea midline   Lungs:   Clear to auscultation bilaterally; no rales, rhonchi or wheezing; respirations unlabored    Heart::   Regular rate and rhythm; no murmur, rub, or gallop.   Abdomen:   Soft, non-tender, non-distended; normal bowel sounds; no masses, no organomegaly    Genitalia:   Deferred    Rectal:   Deferred    Extremities:  No cyanosis, clubbing or edema    Pulses:  2+ and symmetric    Skin:  No jaundice, rashes, or lesions    Lymph nodes:  No palpable cervical lymphadenopathy        Lab Results:   Appointment on 07/08/2024   Component Date Value    Sodium 07/08/2024 139     Potassium 07/08/2024 3.6     Chloride 07/08/2024 102     CO2 07/08/2024 27     ANION GAP 07/08/2024 10     BUN 07/08/2024 19     Creatinine 07/08/2024 0.89     Glucose 07/08/2024 95     Calcium 07/08/2024 10.1     AST 07/08/2024 30     ALT 07/08/2024 18     Alkaline Phosphatase 07/08/2024 43     Total Protein 07/08/2024 7.2     Albumin 07/08/2024 4.2     Total Bilirubin 07/08/2024 0.61     eGFR 07/08/2024 65     WBC 07/08/2024 13.47 (H)     RBC 07/08/2024 4.69     Hemoglobin 07/08/2024 13.6     Hematocrit 07/08/2024 42.4     MCV 07/08/2024 90     MCH 07/08/2024 29.0     MCHC 07/08/2024 32.1     RDW 07/08/2024 13.4     MPV 07/08/2024 10.1     Platelets 07/08/2024 297     nRBC 07/08/2024 0     Segmented % 07/08/2024 73     Immature Grans % 07/08/2024 0     Lymphocytes % 07/08/2024 19     Monocytes % 07/08/2024 7     Eosinophils Relative 07/08/2024 1     Basophils Relative 07/08/2024 0     Absolute Neutrophils 07/08/2024 9.79 (H)     Absolute Immature Grans 07/08/2024 0.05     Absolute Lymphocytes 07/08/2024 2.49     Absolute Monocytes 07/08/2024 0.96     Eosinophils Absolute 07/08/2024 0.14     Basophils Absolute 07/08/2024 0.04          Radiology Results:   No results found.  Answers submitted by the patient for this visit:  Abdominal Pain Questionnaire (Submitted on  7/8/2024)  Chief Complaint: Abdominal pain  Chronicity: new  Onset: yesterday  Onset quality: sudden  Frequency: rarely  Progression since onset: unchanged  Pain location: suprapubic region  Pain - numeric: 1/10  Pain quality: cramping, dull  Radiates to: suprapubic region  anorexia: No  arthralgias: No  belching: No  constipation: No  diarrhea: No  dysuria: No  fever: No  flatus: No  frequency: No  headaches: No  hematochezia: Yes  hematuria: No  melena: No  myalgias: No  nausea: No  weight loss: No  vomiting: No  Aggravated by: nothing  Relieved by: nothing  Diagnostic workup: upper endoscopy

## 2024-07-11 ENCOUNTER — TELEPHONE (OUTPATIENT)
Dept: GASTROENTEROLOGY | Facility: CLINIC | Age: 69
End: 2024-07-11

## 2024-07-11 NOTE — TELEPHONE ENCOUNTER
Called patient  - she had left a message with the nurse in our GI Lab  that she is having an issue getting a   I let patient know unfortunately any option would have to have someone she knows with her  She stated she is working on getting a

## 2024-07-15 ENCOUNTER — ANESTHESIA EVENT (OUTPATIENT)
Dept: GASTROENTEROLOGY | Facility: HOSPITAL | Age: 69
End: 2024-07-15

## 2024-07-15 ENCOUNTER — ANESTHESIA (OUTPATIENT)
Dept: GASTROENTEROLOGY | Facility: HOSPITAL | Age: 69
End: 2024-07-15

## 2024-07-15 ENCOUNTER — HOSPITAL ENCOUNTER (OUTPATIENT)
Dept: GASTROENTEROLOGY | Facility: HOSPITAL | Age: 69
Setting detail: OUTPATIENT SURGERY
Discharge: HOME | End: 2024-07-15
Attending: INTERNAL MEDICINE
Payer: MEDICARE

## 2024-07-15 VITALS
HEIGHT: 64 IN | TEMPERATURE: 98.1 F | SYSTOLIC BLOOD PRESSURE: 136 MMHG | HEART RATE: 73 BPM | RESPIRATION RATE: 20 BRPM | DIASTOLIC BLOOD PRESSURE: 74 MMHG | BODY MASS INDEX: 19.5 KG/M2 | WEIGHT: 114.2 LBS | OXYGEN SATURATION: 100 %

## 2024-07-15 DIAGNOSIS — K62.5 RECTAL BLEEDING: ICD-10-CM

## 2024-07-15 DIAGNOSIS — Z86.010 HISTORY OF COLON POLYPS: ICD-10-CM

## 2024-07-15 PROCEDURE — 88305 TISSUE EXAM BY PATHOLOGIST: CPT | Performed by: PATHOLOGY

## 2024-07-15 PROCEDURE — 45385 COLONOSCOPY W/LESION REMOVAL: CPT | Performed by: INTERNAL MEDICINE

## 2024-07-15 RX ORDER — PROPOFOL 10 MG/ML
INJECTION, EMULSION INTRAVENOUS AS NEEDED
Status: DISCONTINUED | OUTPATIENT
Start: 2024-07-15 | End: 2024-07-15

## 2024-07-15 RX ORDER — SODIUM CHLORIDE, SODIUM LACTATE, POTASSIUM CHLORIDE, CALCIUM CHLORIDE 600; 310; 30; 20 MG/100ML; MG/100ML; MG/100ML; MG/100ML
INJECTION, SOLUTION INTRAVENOUS CONTINUOUS PRN
Status: DISCONTINUED | OUTPATIENT
Start: 2024-07-15 | End: 2024-07-15

## 2024-07-15 RX ADMIN — PROPOFOL 70 MG: 10 INJECTION, EMULSION INTRAVENOUS at 09:19

## 2024-07-15 RX ADMIN — SODIUM CHLORIDE, SODIUM LACTATE, POTASSIUM CHLORIDE, AND CALCIUM CHLORIDE: .6; .31; .03; .02 INJECTION, SOLUTION INTRAVENOUS at 09:07

## 2024-07-15 RX ADMIN — PROPOFOL 30 MG: 10 INJECTION, EMULSION INTRAVENOUS at 09:28

## 2024-07-15 RX ADMIN — PROPOFOL 30 MG: 10 INJECTION, EMULSION INTRAVENOUS at 09:20

## 2024-07-15 RX ADMIN — PROPOFOL 30 MG: 10 INJECTION, EMULSION INTRAVENOUS at 09:31

## 2024-07-15 RX ADMIN — PROPOFOL 30 MG: 10 INJECTION, EMULSION INTRAVENOUS at 09:23

## 2024-07-15 RX ADMIN — PROPOFOL 30 MG: 10 INJECTION, EMULSION INTRAVENOUS at 09:25

## 2024-07-15 NOTE — ANESTHESIA POSTPROCEDURE EVALUATION
Post-Op Assessment Note    CV Status:  Stable    Pain management: adequate       Mental Status:  Sleepy   Hydration Status:  Euvolemic   PONV Controlled:  Controlled   Airway Patency:  Patent  Two or more mitigation strategies used for obstructive sleep apnea   Post Op Vitals Reviewed: Yes    No anethesia notable event occurred.    Staff: Anesthesiologist, CRNA               /67 (07/15/24 0935)    Temp 98.1 °F (36.7 °C) (07/15/24 0935)    Pulse 72 (07/15/24 0935)   Resp 18 (07/15/24 0935)    SpO2 98 % (07/15/24 0935)

## 2024-07-15 NOTE — ANESTHESIA PREPROCEDURE EVALUATION
Procedure:  COLONOSCOPY    Relevant Problems   CARDIO   (+) Pure hypercholesterolemia      /RENAL   (+) CKD (chronic kidney disease) stage 2, GFR 60-89 ml/min      MUSCULOSKELETAL   (+) Primary osteoarthritis      NEURO/PSYCH   (+) Generalized anxiety disorder        Physical Exam    Airway    Mallampati score: I  TM Distance: >3 FB  Neck ROM: full     Dental       Cardiovascular  Cardiovascular exam normal    Pulmonary  Pulmonary exam normal     Other Findings  post-pubertal.      Anesthesia Plan  ASA Score- 2     Anesthesia Type- IV sedation with anesthesia with ASA Monitors.         Additional Monitors:     Airway Plan:            Plan Factors-Exercise tolerance (METS): >4 METS.    Chart reviewed. EKG reviewed. Imaging results reviewed. Existing labs reviewed. Patient summary reviewed.                  Induction- intravenous.    Postoperative Plan- Plan for postoperative opioid use. Planned trial extubation        Informed Consent- Anesthetic plan and risks discussed with patient.  I personally reviewed this patient with the CRNA. Discussed and agreed on the Anesthesia Plan with the CRNA..

## 2024-07-15 NOTE — H&P
History and Physical - [facility]  Alicia Rai 69 y.o. female MRN: [id number]      HPI: Alicia Rai is a 69 y.o. year old female who presents for evaluation of rectal bleeding and a history of colon polyps      REVIEW OF SYSTEMS: Per the HPI, and otherwise unremarkable.    Historical Information   Past Medical History:   Diagnosis Date    Colon polyp     Medical history reviewed with no changes     Osteoporosis     Thyroglossal cyst      Past Surgical History:   Procedure Laterality Date    BREAST BIOPSY Left 2018    benign    CATARACT EXTRACTION      COLONOSCOPY      2017    CYSTOSCOPY  2021    Dr. Jeannette Hadley     DENTAL IMPLANT      EGD  2022    ERCP  2021    Bile duct abnormality    ND MASTECTOMY PARTIAL Right 2024    Procedure: RIGHT BREAST SAIGE  DIRECTED LUMPECTOMY;  Surgeon: Tj Seo MD;  Location: [facility];  Service: Surgical Oncology    THROAT SURGERY      UPPER GASTROINTESTINAL ENDOSCOPY  2021    Bile duct abnormality    US BREAST CLIP NEEDLE LOC RIGHT Right 2024    US GUIDED BREAST BIOPSY RIGHT COMPLETE Right 2023     Social History   Social History     Substance and Sexual Activity   Alcohol Use Yes    Alcohol/week: 14.0 standard drinks of alcohol    Types: 14 Cans of beer per week    Comment: 2 per day.  I filled this info out for Dr Obed Laws!     Social History     Substance and Sexual Activity   Drug Use Never     Social History     Tobacco Use   Smoking Status Former    Current packs/day: 0.00    Average packs/day: 0.3 packs/day for 33.2 years (8.3 ttl pk-yrs)    Types: Cigarettes    Start date: 3/17/1971    Quit date: 3/17/2004    Years since quittin.3   Smokeless Tobacco Never   Tobacco Comments    quit      Family History   Problem Relation Age of Onset    Dementia Mother     Diabetes Father     No Known Problems Maternal Aunt     No Known Problems Maternal Aunt     No Known Problems  "Maternal Aunt     Breast cancer Cousin 45    Breast cancer Cousin 45    Colon cancer Neg Hx     Ovarian cancer Neg Hx     Uterine cancer Neg Hx     Cervical cancer Neg Hx        Meds/Allergies     Not in a hospital admission.    No Known Allergies    Objective     Blood pressure 152/83, pulse 89, temperature 97.9 °F (36.6 °C), temperature source Temporal, resp. rate 17, height 5' 4\" (1.626 m), weight 51.8 kg (114 lb 3.2 oz), SpO2 97%, not currently breastfeeding.      PHYSICAL EXAM    Gen: NAD  CV: RRR  CHEST: Clear  ABD: soft, NT/ND  EXT: no edema      ASSESSMENT/PLAN:  This is a 69 y.o. year old female here for colonoscopy., and she is stable and optimized for her procedure.          "

## 2024-07-22 PROCEDURE — 88305 TISSUE EXAM BY PATHOLOGIST: CPT | Performed by: PATHOLOGY

## 2024-07-24 ENCOUNTER — TELEPHONE (OUTPATIENT)
Dept: GASTROENTEROLOGY | Facility: CLINIC | Age: 69
End: 2024-07-24

## 2024-07-24 NOTE — TELEPHONE ENCOUNTER
----- Message from Ab Olvera DO sent at 7/24/2024  7:51 AM EDT -----  The next colonoscopy due in 5 years.      Alicia,     There were 2 polyps removed from the colon and both polyps were precancerous adenomas.  Your next colonoscopy will be due in 5 years.

## 2024-08-27 ENCOUNTER — OFFICE VISIT (OUTPATIENT)
Age: 69
End: 2024-08-27
Payer: MEDICARE

## 2024-08-27 ENCOUNTER — APPOINTMENT (OUTPATIENT)
Age: 69
End: 2024-08-27
Payer: MEDICARE

## 2024-08-27 VITALS
BODY MASS INDEX: 19.84 KG/M2 | HEIGHT: 64 IN | OXYGEN SATURATION: 99 % | SYSTOLIC BLOOD PRESSURE: 128 MMHG | HEART RATE: 71 BPM | DIASTOLIC BLOOD PRESSURE: 82 MMHG | WEIGHT: 116.2 LBS | TEMPERATURE: 98.2 F | RESPIRATION RATE: 18 BRPM

## 2024-08-27 DIAGNOSIS — R20.0 NUMBNESS OF TOES: Primary | ICD-10-CM

## 2024-08-27 DIAGNOSIS — R20.0 NUMBNESS: Primary | ICD-10-CM

## 2024-08-27 LAB
FOLATE SERPL-MCNC: >22.3 NG/ML
VIT B12 SERPL-MCNC: 339 PG/ML (ref 180–914)

## 2024-08-27 PROCEDURE — 99214 OFFICE O/P EST MOD 30 MIN: CPT

## 2024-08-27 PROCEDURE — 82746 ASSAY OF FOLIC ACID SERUM: CPT

## 2024-08-27 PROCEDURE — 82607 VITAMIN B-12: CPT

## 2024-08-27 PROCEDURE — 36415 COLL VENOUS BLD VENIPUNCTURE: CPT

## 2024-08-27 NOTE — PROGRESS NOTES
Ambulatory Visit  Name: Alicia Rai      : 1955      MRN: [id number]  Encounter Provider: Michelle Akers PA-C  Encounter Date: 2024   Encounter department: [facility] [place]    Assessment & Plan   1. Numbness  -     Vitamin B12/Folate, Serum Panel; Future  Reviewed lab work, no concerning findings.  Patient does drink 2 beers a night, but not an excessive amount.  It is possible this is contributing to the altered sensation.  I think this has more to do with trauma due to ill fitting footwear and with repetitive pressure on the right index finger.  I would give this more time to resolve.  Will get a B12/folate today and call with results.  Otherwise this is not affecting the patient's daily life and does not rise to the level of needing an EMG to assess nerve conduction of the finger and toes.     History of Present Illness     HPI    She has some numbness of her right index finger that started about a month ago.  She was using a lighter on a windy day and was using the trigger on the lighter frequently.  Ever since then she has intermittent numbness of the area.  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  The numnbess on the thumb side of her distal index finger.      Pt has left toe numbness past 1.5 months.  She saw a doctor at her place of work ([facility] [place]).  He did labs which showed normal CMP.  CBC showed elevated white count, but otherwise normal.  She didn't have a B12/folate done.  She does take a folate supplement.  She drinks 2 [employer] lights every night.    Review of Systems   Constitutional: Negative.    Respiratory: Negative.     Cardiovascular: Negative.    Musculoskeletal:  Negative for gait problem.   Neurological:  Positive for numbness (right index finger, left great toe).   All other systems reviewed and are negative.      Objective     /82 (BP Location: Left arm, Patient Position: Sitting, Cuff Size: Standard)   Pulse 71    "Temp 98.2 °F (36.8 °C) (Tympanic)   Resp 18   Ht 5' 4\" (1.626 m)   Wt 52.7 kg (116 lb 3.2 oz)   LMP  (LMP Unknown)   SpO2 99%   BMI 19.95 kg/m²     Physical Exam  Vitals and nursing note reviewed.   Constitutional:       General: She is not in acute distress.     Appearance: Normal appearance.   Cardiovascular:      Rate and Rhythm: Normal rate and regular rhythm.      Heart sounds: Normal heart sounds.   Pulmonary:      Effort: Pulmonary effort is normal. No respiratory distress.      Breath sounds: Normal breath sounds.   Musculoskeletal:         General: No tenderness, deformity or signs of injury.        Hands:         Feet:       Comments: Using monofilament, patient demonstrated full sensation of the right index finger.  Sensation compared with the left index finger and it was the same.  Finger was warm, brisk cap refill, no evidence of trauma or deformity.    Left great toe showed no deformity.  Nail had nail polish on it, but was not thickened or excessively long.  No evidence of trauma or deformity.  Shoes did not appear to be too tight.  Toe is pink.  Using monofilament, patient demonstrated full sensation throughout the left great toe.   Skin:     General: Skin is warm and dry.      Capillary Refill: Capillary refill takes less than 2 seconds.      Coloration: Skin is not cyanotic or mottled.   Neurological:      Mental Status: She is alert and oriented to person, place, and time.       Administrative Statements           "

## 2024-10-04 ENCOUNTER — OFFICE VISIT (OUTPATIENT)
Age: 69
End: 2024-10-04
Payer: MEDICARE

## 2024-10-04 VITALS
HEIGHT: 64 IN | HEART RATE: 85 BPM | SYSTOLIC BLOOD PRESSURE: 120 MMHG | OXYGEN SATURATION: 99 % | DIASTOLIC BLOOD PRESSURE: 76 MMHG | WEIGHT: 114.6 LBS | TEMPERATURE: 97.2 F | RESPIRATION RATE: 18 BRPM | BODY MASS INDEX: 19.56 KG/M2

## 2024-10-04 DIAGNOSIS — Z56.6 STRESSFUL JOB: Primary | ICD-10-CM

## 2024-10-04 PROBLEM — F41.1 GENERALIZED ANXIETY DISORDER: Status: RESOLVED | Noted: 2024-01-30 | Resolved: 2024-10-04

## 2024-10-04 PROCEDURE — G2211 COMPLEX E/M VISIT ADD ON: HCPCS | Performed by: INTERNAL MEDICINE

## 2024-10-04 PROCEDURE — 99213 OFFICE O/P EST LOW 20 MIN: CPT | Performed by: INTERNAL MEDICINE

## 2024-10-04 SDOH — HEALTH STABILITY - MENTAL HEALTH: OTHER PHYSICAL AND MENTAL STRAIN RELATED TO WORK: Z56.6

## 2024-10-04 NOTE — LETTER
October 4, 2024     Patient: Alicia Rai  YOB: 1955  Date of Visit: 10/4/2024    To Whom it May Concern:    Alicia Rai is under my professional care. Alicia was seen in my office on 10/4/2024. Alicia may return to work on 10/7/2024 .    If you have any questions or concerns, please don't hesitate to call.         Sincerely,          Steve العلي, DO

## 2024-10-04 NOTE — PROGRESS NOTES
"Ambulatory Visit  Name: Alicia Rai      : 1955      MRN: [id number]  Encounter Provider: Steve العلي DO  Encounter Date: 10/4/2024   Encounter department: [facility] [place]    Assessment & Plan  Stressful job    Work note was printed out for her       History of Present Illness     History of Present Illness  The patient is a 69-year-old female who presents for evaluation of stress.    She reports experiencing stress due to her work environment, which has led to physical symptoms such as stomach discomfort. 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 Recently, she took two consecutive days off work, which resulted in some difficulties with her employer. She is considering resignation due to these circumstances. She denies any feelings of depression or sadness, but admits to experiencing stress and worry. She also mentions a ganglion cyst that is currently under medical evaluation.     Review of Systems   Constitutional: Negative.    Respiratory: Negative.     Cardiovascular: Negative.    Psychiatric/Behavioral:  The patient is nervous/anxious (stress).      Objective     /76 (BP Location: Left arm, Patient Position: Sitting, Cuff Size: Standard)   Pulse 85   Temp (!) 97.2 °F (36.2 °C) (Tympanic)   Resp 18   Ht 5' 4\" (1.626 m)   Wt 52 kg (114 lb 9.6 oz)   LMP  (LMP Unknown)   SpO2 99%   BMI 19.67 kg/m²     Physical Exam  Constitutional:       General: She is not in acute distress.     Appearance: She is not ill-appearing.   Cardiovascular:      Rate and Rhythm: Normal rate and regular rhythm.      Heart sounds: No murmur heard.  Pulmonary:      Effort: Pulmonary effort is normal. No respiratory distress.      Breath sounds: No wheezing.   Neurological:      Mental Status: She is alert.       Steve العلي DO   "
0 = swallows foods/liquids without difficulty

## 2024-10-22 ENCOUNTER — HOSPITAL ENCOUNTER (OUTPATIENT)
Age: 69
Discharge: HOME | End: 2024-10-22
Payer: MEDICARE

## 2024-10-22 VITALS — BODY MASS INDEX: 21.16 KG/M2 | WEIGHT: 115 LBS | HEIGHT: 62 IN

## 2024-10-22 DIAGNOSIS — Z78.0 ASYMPTOMATIC UNCOMPLICATED MENOPAUSE: ICD-10-CM

## 2024-10-22 PROCEDURE — 77080 DXA BONE DENSITY AXIAL: CPT

## 2024-10-24 ENCOUNTER — TELEPHONE (OUTPATIENT)
Age: 69
End: 2024-10-24

## 2024-10-24 NOTE — TELEPHONE ENCOUNTER
----- Message from Russel Meyer MD sent at 10/24/2024 12:55 PM EDT -----  Please call patient to schedule virtual visit to discuss DEXA scan results and management.

## 2024-10-24 NOTE — TELEPHONE ENCOUNTER
Called patient she would like to come in she does not want to do a virtual she said she is off on Monday but no appointments are available or she is off 11/04 but I told her we only have same day appointments and we can not override that the manager has to I told her if no one cancels then I will let Mari know

## 2024-11-07 ENCOUNTER — RA CDI HCC (OUTPATIENT)
Dept: OTHER | Facility: HOSPITAL | Age: 69
End: 2024-11-07

## 2024-11-11 ENCOUNTER — TELEPHONE (OUTPATIENT)
Age: 69
End: 2024-11-11

## 2024-11-12 NOTE — TELEPHONE ENCOUNTER
Procedure:  ERCP  Scheduled date of procedure (as of today):  12/17/24  Physician performing procedure: Dr. Rodriguez   Location of procedure: [place]   Instructions reviewed with patient by:  Ne ramos   Clearances:  n/a

## 2024-11-14 ENCOUNTER — TELEPHONE (OUTPATIENT)
Age: 69
End: 2024-11-14

## 2024-11-14 ENCOUNTER — OFFICE VISIT (OUTPATIENT)
Age: 69
End: 2024-11-14
Payer: MEDICARE

## 2024-11-14 VITALS
HEIGHT: 62 IN | DIASTOLIC BLOOD PRESSURE: 70 MMHG | RESPIRATION RATE: 14 BRPM | SYSTOLIC BLOOD PRESSURE: 118 MMHG | HEART RATE: 86 BPM | OXYGEN SATURATION: 97 % | BODY MASS INDEX: 21.64 KG/M2 | WEIGHT: 117.6 LBS | TEMPERATURE: 97.6 F

## 2024-11-14 DIAGNOSIS — Z13.6 ENCOUNTER FOR LIPID SCREENING FOR CARDIOVASCULAR DISEASE: ICD-10-CM

## 2024-11-14 DIAGNOSIS — F41.1 GENERALIZED ANXIETY DISORDER: ICD-10-CM

## 2024-11-14 DIAGNOSIS — M81.0 AGE-RELATED OSTEOPOROSIS WITHOUT CURRENT PATHOLOGICAL FRACTURE: Primary | ICD-10-CM

## 2024-11-14 DIAGNOSIS — E55.9 VITAMIN D INSUFFICIENCY: ICD-10-CM

## 2024-11-14 DIAGNOSIS — N18.2 CKD (CHRONIC KIDNEY DISEASE) STAGE 2, GFR 60-89 ML/MIN: ICD-10-CM

## 2024-11-14 DIAGNOSIS — Z13.220 ENCOUNTER FOR LIPID SCREENING FOR CARDIOVASCULAR DISEASE: ICD-10-CM

## 2024-11-14 PROCEDURE — G2211 COMPLEX E/M VISIT ADD ON: HCPCS | Performed by: FAMILY MEDICINE

## 2024-11-14 PROCEDURE — 99213 OFFICE O/P EST LOW 20 MIN: CPT | Performed by: FAMILY MEDICINE

## 2024-11-14 NOTE — ASSESSMENT & PLAN NOTE
Lab Results   Component Value Date    EGFR 65 07/08/2024    EGFR 67 12/08/2023    EGFR 69 12/23/2022    CREATININE 0.89 07/08/2024    CREATININE 0.88 12/08/2023    CREATININE 0.86 12/23/2022       Orders:    Comprehensive metabolic panel; Future    CBC and differential; Future

## 2024-11-14 NOTE — TELEPHONE ENCOUNTER
Patient calling as she is switching insurances and wanted to verify that the office accepts [employer] PPO. States she spoke with her insurance to stated yes but wanted to verify. Per insurance par list, insurance is accepted.

## 2024-11-14 NOTE — ASSESSMENT & PLAN NOTE
DXA scan 10/22/24 showed osteoporosis. Previous 2022 scan showed osteopenia.   Reviewed imaging with patient with discussion of bisphosphonate treatment versus calcium/vitamin D/vitamin K2 supplementations only.   Previously on the bisphosphonate for 8 years and pt preferred to come off.  Patient declines.

## 2024-11-14 NOTE — PROGRESS NOTES
[facility]  Ambulatory Office Visit     PATIENT INFORMATION   Name: Alicia Rai   YOB: 1955   MRN: [id number]  Encounter Provider: Russel Meyer MD    Encounter Date: 11/14/2024    ASSESSMENT & PLAN     Assessment & Plan  Age-related osteoporosis without current pathological fracture  DXA scan 10/22/24 showed osteoporosis. Previous 2022 scan showed osteopenia.   Reviewed imaging with patient with discussion of bisphosphonate treatment versus calcium/vitamin D/vitamin K2 supplementations only.   Previously on the bisphosphonate for 8 years and pt preferred to come off.  Patient declines.       Vitamin D insufficiency    Orders:    Vitamin D 25 hydroxy; Future    Encounter for lipid screening for cardiovascular disease    Orders:    Lipid Panel with Direct LDL reflex; Future    Apolipoprotein B; Future    Generalized anxiety disorder    Orders:    Comprehensive metabolic panel; Future    CBC and differential; Future    CKD (chronic kidney disease) stage 2, GFR 60-89 ml/min  Lab Results   Component Value Date    EGFR 65 07/08/2024    EGFR 67 12/08/2023    EGFR 69 12/23/2022    CREATININE 0.89 07/08/2024    CREATININE 0.88 12/08/2023    CREATININE 0.86 12/23/2022       Orders:    Comprehensive metabolic panel; Future    CBC and differential; Future         HEALTH MAINTENANCE     Immunization History   Administered Date(s) Administered    INFLUENZA 09/30/2014    Pneumococcal Conjugate 13-Valent 08/01/2019    Td (adult), adsorbed 05/28/2002    Tdap 02/14/2013     Pap smear:11/01/2022  Mammogram:11/29/2023   Colonoscopy:07/15/2024 07/15/2024  Cologuard:Not on file   DEXA scan:10/22/2024      FOLLOW UP   Return in about 3 months (around 2/18/2025).    CURRENT MEDICATIONS     Current Outpatient Medications:     calcium carbonate (OS-CARTER) 600 MG tablet, Take 600 mg by mouth daily, Disp: , Rfl:     Cholecalciferol (VITAMIN D3) 1000 units CAPS, Take by mouth in the morning, Disp: , Rfl:      "COLLAGEN PO, Take 5,000 mcg by mouth daily, Disp: , Rfl:     folic acid (FOLVITE) 1 mg tablet, Take by mouth daily, Disp: , Rfl:     VITAMIN K PO, Take by mouth, Disp: , Rfl:       CHIEF COMPLIANT     Chief Complaint   Patient presents with    Follow-up     Review Dexa        HISTORY OF PRESENT ILLNESS    History of Present Illness     History obtained from : patient  HPI  Review of Systems    PAST MEDICAL & SURGICAL HISTORY     Past Medical History:   Diagnosis Date    Colon polyp     Medical history reviewed with no changes     Osteoporosis     Thyroglossal cyst      Past Surgical History:   Procedure Laterality Date    BREAST BIOPSY Left 2018    benign    CATARACT EXTRACTION      COLONOSCOPY      2017    CYSTOSCOPY  07/06/2021    Dr. Jeannette Hadley     DENTAL IMPLANT      EGD  09/27/2022    ERCP  03/16/2021    Bile duct abnormality    [place] MASTECTOMY PARTIAL Right 01/11/2024    Procedure: RIGHT BREAST SAIGE  DIRECTED LUMPECTOMY;  Surgeon: Tj Seo MD;  Location: [facility];  Service: Surgical Oncology    THROAT SURGERY      UPPER GASTROINTESTINAL ENDOSCOPY  03/16/2021    Bile duct abnormality    US BREAST CLIP NEEDLE LOC RIGHT Right 01/04/2024    US GUIDED BREAST BIOPSY RIGHT COMPLETE Right 12/02/2023       OBJECTIVES      /70 (BP Location: Left arm, Patient Position: Sitting, Cuff Size: Standard)   Pulse 86   Temp 97.6 °F (36.4 °C) (Tympanic Core)   Resp 14   Ht 5' 2\" (1.575 m)   Wt 53.3 kg (117 lb 9.6 oz)   LMP  (LMP Unknown)   SpO2 97%   BMI 21.51 kg/m²    Physical Exam  Vitals reviewed.   Constitutional:       General: She is not in acute distress.     Appearance: Normal appearance. She is not ill-appearing, toxic-appearing or diaphoretic.   HENT:      Head: Normocephalic and atraumatic.      Right Ear: External ear normal.      Left Ear: External ear normal.      Nose: No congestion or rhinorrhea.   Eyes:      General: No scleral icterus.        Right eye: No discharge.        "  Left eye: No discharge.      Conjunctiva/sclera: Conjunctivae normal.   Cardiovascular:      Rate and Rhythm: Normal rate.   Pulmonary:      Effort: Pulmonary effort is normal. No respiratory distress.   Neurological:      General: No focal deficit present.      Mental Status: She is alert and oriented to person, place, and time.   Psychiatric:         Mood and Affect: Mood normal.         Behavior: Behavior normal.         Thought Content: Thought content normal.           Russel Meyer MD  Family Medicine Physician   [facility] [place]      Administrative Statements     Medications have been reviewed by provider in current encounter

## 2024-12-02 ENCOUNTER — HOSPITAL ENCOUNTER (OUTPATIENT)
Dept: MAMMOGRAPHY | Facility: CLINIC | Age: 69
Discharge: HOME | End: 2024-12-02
Payer: MEDICARE

## 2024-12-02 VITALS — BODY MASS INDEX: 21.53 KG/M2 | WEIGHT: 117 LBS | HEIGHT: 62 IN

## 2024-12-02 DIAGNOSIS — R92.8 ABNORMAL MAMMOGRAM: ICD-10-CM

## 2024-12-02 DIAGNOSIS — Z12.31 ENCOUNTER FOR SCREENING MAMMOGRAM FOR MALIGNANT NEOPLASM OF BREAST: ICD-10-CM

## 2024-12-02 PROCEDURE — 77063 BREAST TOMOSYNTHESIS BI: CPT

## 2024-12-02 PROCEDURE — 77067 SCR MAMMO BI INCL CAD: CPT

## 2024-12-04 ENCOUNTER — RESULTS FOLLOW-UP (OUTPATIENT)
Age: 69
End: 2024-12-04

## 2024-12-04 NOTE — TELEPHONE ENCOUNTER
FYI - Patient returned call regarding results. Read Guadalupe's message verbatim. Pt verbalized understanding but advised they will decline an ABUS in 6 mos. Pt aware to call back with any further questions/ concerns or if they would like to have the ABUS completed. Pt verbalized understanding & had no further questions at this time.     Please close result note.

## 2024-12-04 NOTE — RESULT ENCOUNTER NOTE
Please let patient know her mammogram is negative. Based on her breast density she may benefit from additional screening with automated breast ultrasound in 6 months. This is not always covered by insurance and I recommend checking coverage first. If patient desires to have the additional testing she may contact our office and we can order it. Thanks.

## 2024-12-17 ENCOUNTER — HOSPITAL ENCOUNTER (OUTPATIENT)
Dept: GASTROENTEROLOGY | Facility: HOSPITAL | Age: 69
Setting detail: OUTPATIENT SURGERY
Discharge: HOME | End: 2024-12-17
Attending: INTERNAL MEDICINE
Payer: MEDICARE

## 2024-12-17 ENCOUNTER — HOSPITAL ENCOUNTER (OUTPATIENT)
Dept: RADIOLOGY | Facility: HOSPITAL | Age: 69
Discharge: HOME | End: 2024-12-17
Payer: MEDICARE

## 2024-12-17 ENCOUNTER — ANESTHESIA EVENT (OUTPATIENT)
Dept: GASTROENTEROLOGY | Facility: HOSPITAL | Age: 69
End: 2024-12-17
Payer: MEDICARE

## 2024-12-17 ENCOUNTER — PREP FOR PROCEDURE (OUTPATIENT)
Dept: GASTROENTEROLOGY | Facility: CLINIC | Age: 69
End: 2024-12-17

## 2024-12-17 ENCOUNTER — ANESTHESIA (OUTPATIENT)
Dept: GASTROENTEROLOGY | Facility: HOSPITAL | Age: 69
End: 2024-12-17
Payer: MEDICARE

## 2024-12-17 ENCOUNTER — PATIENT MESSAGE (OUTPATIENT)
Dept: GASTROENTEROLOGY | Facility: CLINIC | Age: 69
End: 2024-12-17

## 2024-12-17 VITALS
HEART RATE: 74 BPM | HEIGHT: 62 IN | TEMPERATURE: 96.2 F | DIASTOLIC BLOOD PRESSURE: 65 MMHG | RESPIRATION RATE: 18 BRPM | OXYGEN SATURATION: 98 % | SYSTOLIC BLOOD PRESSURE: 141 MMHG | WEIGHT: 117 LBS | BODY MASS INDEX: 21.53 KG/M2

## 2024-12-17 DIAGNOSIS — K83.9 BILE DUCT ABNORMALITY: ICD-10-CM

## 2024-12-17 DIAGNOSIS — D13.5 AMPULLARY ADENOMA: Primary | ICD-10-CM

## 2024-12-17 DIAGNOSIS — D13.5 AMPULLARY ADENOMA: ICD-10-CM

## 2024-12-17 PROCEDURE — 43278 ERCP LESION ABLATE W/DILATE: CPT | Performed by: INTERNAL MEDICINE

## 2024-12-17 PROCEDURE — C1726 CATH, BAL DIL, NON-VASCULAR: HCPCS | Performed by: STUDENT IN AN ORGANIZED HEALTH CARE EDUCATION/TRAINING PROGRAM

## 2024-12-17 PROCEDURE — 43276 ERCP STENT EXCHANGE W/DILATE: CPT | Performed by: INTERNAL MEDICINE

## 2024-12-17 PROCEDURE — 43264 ERCP REMOVE DUCT CALCULI: CPT | Performed by: INTERNAL MEDICINE

## 2024-12-17 PROCEDURE — C2617 STENT, NON-COR, TEM W/O DEL: HCPCS | Performed by: STUDENT IN AN ORGANIZED HEALTH CARE EDUCATION/TRAINING PROGRAM

## 2024-12-17 PROCEDURE — 43261 ENDO CHOLANGIOPANCREATOGRAPH: CPT | Performed by: INTERNAL MEDICINE

## 2024-12-17 PROCEDURE — 88305 TISSUE EXAM BY PATHOLOGIST: CPT | Performed by: PATHOLOGY

## 2024-12-17 PROCEDURE — C1874 STENT, COATED/COV W/DEL SYS: HCPCS | Performed by: STUDENT IN AN ORGANIZED HEALTH CARE EDUCATION/TRAINING PROGRAM

## 2024-12-17 PROCEDURE — C1769 GUIDE WIRE: HCPCS | Performed by: STUDENT IN AN ORGANIZED HEALTH CARE EDUCATION/TRAINING PROGRAM

## 2024-12-17 PROCEDURE — 74328 X-RAY BILE DUCT ENDOSCOPY: CPT

## 2024-12-17 PROCEDURE — 88342 IMHCHEM/IMCYTCHM 1ST ANTB: CPT | Performed by: PATHOLOGY

## 2024-12-17 RX ORDER — SUCCINYLCHOLINE/SOD CL,ISO/PF 100 MG/5ML
SYRINGE (ML) INTRAVENOUS AS NEEDED
Status: DISCONTINUED | OUTPATIENT
Start: 2024-12-17 | End: 2024-12-17

## 2024-12-17 RX ORDER — INDOMETHACIN 50 MG/1
SUPPOSITORY RECTAL AS NEEDED
Status: COMPLETED | OUTPATIENT
Start: 2024-12-17 | End: 2024-12-17

## 2024-12-17 RX ORDER — FENTANYL CITRATE 50 UG/ML
INJECTION, SOLUTION INTRAMUSCULAR; INTRAVENOUS AS NEEDED
Status: DISCONTINUED | OUTPATIENT
Start: 2024-12-17 | End: 2024-12-17

## 2024-12-17 RX ORDER — LIDOCAINE HYDROCHLORIDE 10 MG/ML
INJECTION, SOLUTION EPIDURAL; INFILTRATION; INTRACAUDAL; PERINEURAL AS NEEDED
Status: DISCONTINUED | OUTPATIENT
Start: 2024-12-17 | End: 2024-12-17

## 2024-12-17 RX ORDER — SODIUM CHLORIDE, SODIUM LACTATE, POTASSIUM CHLORIDE, CALCIUM CHLORIDE 600; 310; 30; 20 MG/100ML; MG/100ML; MG/100ML; MG/100ML
125 INJECTION, SOLUTION INTRAVENOUS CONTINUOUS
OUTPATIENT
Start: 2024-12-17

## 2024-12-17 RX ORDER — ONDANSETRON 2 MG/ML
INJECTION INTRAMUSCULAR; INTRAVENOUS AS NEEDED
Status: DISCONTINUED | OUTPATIENT
Start: 2024-12-17 | End: 2024-12-17

## 2024-12-17 RX ORDER — SODIUM CHLORIDE 9 MG/ML
INJECTION, SOLUTION INTRAVENOUS CONTINUOUS PRN
Status: DISCONTINUED | OUTPATIENT
Start: 2024-12-17 | End: 2024-12-17

## 2024-12-17 RX ORDER — PROPOFOL 10 MG/ML
INJECTION, EMULSION INTRAVENOUS AS NEEDED
Status: DISCONTINUED | OUTPATIENT
Start: 2024-12-17 | End: 2024-12-17

## 2024-12-17 RX ORDER — DEXAMETHASONE SODIUM PHOSPHATE 10 MG/ML
INJECTION, SOLUTION INTRAMUSCULAR; INTRAVENOUS AS NEEDED
Status: DISCONTINUED | OUTPATIENT
Start: 2024-12-17 | End: 2024-12-17

## 2024-12-17 RX ADMIN — IOHEXOL 5 ML: 240 INJECTION, SOLUTION INTRATHECAL; INTRAVASCULAR; INTRAVENOUS; ORAL at 10:14

## 2024-12-17 RX ADMIN — Medication 60 MG: at 09:06

## 2024-12-17 RX ADMIN — ONDANSETRON 4 MG: 2 INJECTION INTRAMUSCULAR; INTRAVENOUS at 10:04

## 2024-12-17 RX ADMIN — INDOMETHACIN 100 MG: 50 SUPPOSITORY RECTAL at 09:19

## 2024-12-17 RX ADMIN — PROPOFOL 100 MG: 10 INJECTION, EMULSION INTRAVENOUS at 09:06

## 2024-12-17 RX ADMIN — DEXAMETHASONE SODIUM PHOSPHATE 10 MG: 10 INJECTION INTRAMUSCULAR; INTRAVENOUS at 09:11

## 2024-12-17 RX ADMIN — FENTANYL CITRATE 50 MCG: 50 INJECTION INTRAMUSCULAR; INTRAVENOUS at 09:06

## 2024-12-17 RX ADMIN — SODIUM CHLORIDE: 0.9 INJECTION, SOLUTION INTRAVENOUS at 09:03

## 2024-12-17 RX ADMIN — LIDOCAINE HYDROCHLORIDE 50 MG: 10 INJECTION, SOLUTION EPIDURAL; INFILTRATION; INTRACAUDAL; PERINEURAL at 09:06

## 2024-12-17 NOTE — ANESTHESIA PREPROCEDURE EVALUATION
Procedure:  ERCP    Relevant Problems   CARDIO   (+) Pure hypercholesterolemia      /RENAL   (+) CKD (chronic kidney disease) stage 2, GFR 60-89 ml/min      MUSCULOSKELETAL   (+) Primary osteoarthritis        Physical Exam    Airway    Mallampati score: II  TM Distance: >3 FB  Neck ROM: full     Dental   No notable dental hx     Cardiovascular  Cardiovascular exam normal    Pulmonary  Pulmonary exam normal     Other Findings  post-pubertal.      Anesthesia Plan  ASA Score- 2     Anesthesia Type- general with ASA Monitors.         Additional Monitors:     Airway Plan: ETT.           Plan Factors-Exercise tolerance (METS): >4 METS.    Chart reviewed. EKG reviewed.  Existing labs reviewed. Patient summary reviewed.    Patient is not a current smoker.              Induction- intravenous.    Postoperative Plan- Plan for postoperative opioid use.         Informed Consent- Anesthetic plan and risks discussed with patient.  I personally reviewed this patient with the CRNA. Discussed and agreed on the Anesthesia Plan with the CRNA..

## 2024-12-17 NOTE — H&P
History and Physical - [facility]  Alicia Rai 69 y.o. female MRN: [id number]                  HPI: Alicia Rai is a 69 y.o. year old female who presents for ERCP for ampullary adenoma      REVIEW OF SYSTEMS: Per the HPI, and otherwise unremarkable.    Historical Information   Past Medical History:   Diagnosis Date    Colon polyp     Medical history reviewed with no changes     Osteoporosis     Thyroglossal cyst      Past Surgical History:   Procedure Laterality Date    BREAST BIOPSY Left 2018    benign    CATARACT EXTRACTION      COLONOSCOPY      2017    CYSTOSCOPY  2021    Dr. Jeannette Hadley     DENTAL IMPLANT      EGD  2022    ERCP  2021    Bile duct abnormality    [place] MASTECTOMY PARTIAL Right 2024    Procedure: RIGHT BREAST SAIGE  DIRECTED LUMPECTOMY;  Surgeon: Tj Seo MD;  Location: [facility];  Service: Surgical Oncology    THROAT SURGERY      UPPER GASTROINTESTINAL ENDOSCOPY  2021    Bile duct abnormality    US BREAST CLIP NEEDLE LOC RIGHT Right 2024    US BREAST CYST ASPIRATION LEFT INITIAL Left 2017    US GUIDED BREAST BIOPSY RIGHT COMPLETE Right 2023     Social History   Social History     Substance and Sexual Activity   Alcohol Use Yes    Alcohol/week: 14.0 standard drinks of alcohol    Types: 14 Cans of beer per week    Comment: 2  beers per day     Social History     Substance and Sexual Activity   Drug Use Never     Social History     Tobacco Use   Smoking Status Former    Current packs/day: 0.00    Average packs/day: 0.3 packs/day for 33.2 years (8.3 ttl pk-yrs)    Types: Cigarettes    Start date: 3/17/1971    Quit date: 3/17/2004    Years since quittin.7    Passive exposure: Past   Smokeless Tobacco Never   Tobacco Comments    quit      Family History   Problem Relation Age of Onset    Dementia Mother     Diabetes Father     No Known Problems Maternal Aunt     No Known Problems Maternal Aunt     No  "Known Problems Maternal Aunt     Breast cancer Cousin 45    Breast cancer Cousin 45    Colon cancer Neg Hx     Ovarian cancer Neg Hx     Uterine cancer Neg Hx     Cervical cancer Neg Hx        Meds/Allergies       Current Outpatient Medications:     calcium carbonate (OS-CARTER) 600 MG tablet    Cholecalciferol (VITAMIN D3) 1000 units CAPS    COLLAGEN PO    folic acid (FOLVITE) 1 mg tablet    VITAMIN K PO    No Known Allergies    Objective     /78   Pulse 83   Temp (!) 96.9 °F (36.1 °C) (Tympanic)   Resp 20   Ht 5' 2\" (1.575 m)   Wt 53.1 kg (117 lb)   LMP  (LMP Unknown)   SpO2 98%   BMI 21.40 kg/m²       PHYSICAL EXAM    Gen: NAD  Head: NCAT  CV: RRR  CHEST: Clear  ABD: soft, NT/ND  EXT: no edema      ASSESSMENT/PLAN:  This is a 69 y.o. year old female here for ERCP, and she is stable and optimized for her procedure.        "

## 2024-12-17 NOTE — ANESTHESIA POSTPROCEDURE EVALUATION
Post-Op Assessment Note    CV Status:  Stable  Pain Score: 0    Pain management: adequate       Mental Status:  Alert and awake   Hydration Status:  Euvolemic   PONV Controlled:  Controlled   Airway Patency:  Patent     Post Op Vitals Reviewed: Yes    No anethesia notable event occurred.    Staff: CRNA           Last Filed PACU Vitals:  Vitals Value Taken Time   Temp 96.2 °F (35.7 °C) 12/17/24 1015   Pulse 74 12/17/24 1040   /65 12/17/24 1040   Resp 18 12/17/24 1040   SpO2 98 % 12/17/24 1040       Modified Geovani:  Activity: 2 (12/17/2024 10:40 AM)  Respiration: 2 (12/17/2024 10:40 AM)  Circulation: 2 (12/17/2024 10:40 AM)  Consciousness: 2 (12/17/2024 10:40 AM)  Oxygen Saturation: 2 (12/17/2024 10:40 AM)  Modified Geovani Score: 10 (12/17/2024 10:40 AM)

## 2024-12-17 NOTE — H&P
History and Physical - [facility]  Alicia Rai 69 y.o. female MRN: [id number]    HPI: Alicia Rai is a 69 y.o. year old female who presents for ERCP.      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    REVIEW OF SYSTEMS: Per the HPI, and otherwise unremarkable.    HISTORICAL INFO  Past Medical History:   Diagnosis Date    Colon polyp     Medical history reviewed with no changes     Osteoporosis     Thyroglossal cyst      Past Surgical History:   Procedure Laterality Date    BREAST BIOPSY Left 2018    benign    CATARACT EXTRACTION      COLONOSCOPY      2017    CYSTOSCOPY  2021    Dr. Jeannette Hadley     DENTAL IMPLANT      EGD  2022    ERCP  2021    Bile duct abnormality    [place] MASTECTOMY PARTIAL Right 2024    Procedure: RIGHT BREAST SAIGE  DIRECTED LUMPECTOMY;  Surgeon: Tj Seo MD;  Location: [facility];  Service: Surgical Oncology    THROAT SURGERY      UPPER GASTROINTESTINAL ENDOSCOPY  2021    Bile duct abnormality    US BREAST CLIP NEEDLE LOC RIGHT Right 2024    US BREAST CYST ASPIRATION LEFT INITIAL Left 2017    US GUIDED BREAST BIOPSY RIGHT COMPLETE Right 2023     Social History     Tobacco Use    Smoking status: Former     Current packs/day: 0.00     Average packs/day: 0.3 packs/day for 33.2 years (8.3 ttl pk-yrs)     Types: Cigarettes     Start date: 3/17/1971     Quit date: 3/17/2004     Years since quittin.7     Passive exposure: Past    Smokeless tobacco: Never    Tobacco comments:     quit    Vaping Use    Vaping status: Never Used   Substance Use Topics    Alcohol use: Yes     Alcohol/week: 14.0 standard drinks of alcohol     Types: 14 Cans of beer per week      "Comment: 2  beers per day    Drug use: Never       Current Outpatient Medications:     calcium carbonate (OS-CARTER) 600 MG tablet    Cholecalciferol (VITAMIN D3) 1000 units CAPS    COLLAGEN PO    folic acid (FOLVITE) 1 mg tablet    VITAMIN K PO    No Known Allergies    OBJECTIVE  /78   Pulse 83   Temp (!) 96.9 °F (36.1 °C) (Tympanic)   Resp 20   Ht 5' 2\" (1.575 m)   Wt 53.1 kg (117 lb)   LMP  (LMP Unknown)   SpO2 98%   BMI 21.40 kg/m²     PHYSICAL EXAM  Gen: NAD  Head: NCAT  CV: RRR  CHEST: CTAB  ABD: soft, NT/ND  EXT: no edema    ASSESSMENT/PLAN:   This is a 69 y.o. year old female here for ERCP, and she is stable and optimized for her procedure.        "

## 2024-12-20 PROCEDURE — 88342 IMHCHEM/IMCYTCHM 1ST ANTB: CPT | Performed by: PATHOLOGY

## 2024-12-20 PROCEDURE — 88305 TISSUE EXAM BY PATHOLOGIST: CPT | Performed by: PATHOLOGY

## 2024-12-24 PROBLEM — D13.5 AMPULLARY ADENOMA: Status: ACTIVE | Noted: 2024-12-24

## 2024-12-26 ENCOUNTER — CONSULT (OUTPATIENT)
Dept: SURGICAL ONCOLOGY | Facility: CLINIC | Age: 69
End: 2024-12-26
Payer: MEDICARE

## 2024-12-26 VITALS
BODY MASS INDEX: 21.53 KG/M2 | HEART RATE: 85 BPM | TEMPERATURE: 97.5 F | SYSTOLIC BLOOD PRESSURE: 120 MMHG | WEIGHT: 117 LBS | DIASTOLIC BLOOD PRESSURE: 82 MMHG | HEIGHT: 62 IN | RESPIRATION RATE: 18 BRPM | OXYGEN SATURATION: 99 %

## 2024-12-26 DIAGNOSIS — D13.5 AMPULLARY ADENOMA: Primary | ICD-10-CM

## 2024-12-26 DIAGNOSIS — K83.9 BILE DUCT ABNORMALITY: ICD-10-CM

## 2024-12-26 PROCEDURE — 99205 OFFICE O/P NEW HI 60 MIN: CPT | Performed by: SURGERY

## 2024-12-26 NOTE — PROGRESS NOTES
Surgical Oncology Consult       [facility] [place]  [address] [facility] [address]  [phone]    Alicia Rai  1955  [id number]  [facility] [place]  [address] [facility] [address]  [phone]    1. 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  There is no obvious evidence of cancer.  It is unclear the extent of any involvement of her bile or pancreatic duct.  I have recommended obtaining an MRI with MRCP at this time to better evaluate the extent of disease.  If there is no obvious disease seen on MRI, we did discuss surgical intervention since she has been following this adenoma for many years and the area has not been completely cleared.  We also discussed continued short-term follow-up which is what she is in favor of since there is no cancer.  We discussed pancreaticoduodenectomy if needed.  We discussed the risks associated with this procedure.  She is also going to try to decrease her alcohol intake to see if this will decrease the creation of her adenomas as she has had tubular adenomas in her colon as well.  I think this is certainly reasonable.  I will see her back once we have the MRI.  At that time further recommendations will be based on those results.  Her main concern is time under anesthesia since her mother had significant difficulties recovering mentally after a procedure in her 70s.  The patient does attribute this to prolonged anesthesia.  If we would proceed with surgery we would have her see surgical optimization as well as preoperative discussion with anesthesia given these concerns.  She is agreeable to this plan.  All her questions were answered.  Orders:  -     Ambulatory Referral to Surgical Oncology  -     MRI abdomen w wo contrast and mrcp; Future  -      BUN; Future  -     Creatinine, serum; Future  -     Ambulatory Referral to Oncology Genetics; Future  2. Bile duct abnormality  -     Ambulatory Referral to Surgical Oncology      Chief Complaint   Patient presents with    Consult       Return in about 2 weeks (around 2025) for Office visit long.    Oncology History    No history exists.       History of Present Illness: 69-year-old female with a longstanding history of an ampullary adenoma.  These have been resected and ablated for several years.  She was found to have focal high-grade dysplasia in 2021 and then has had serial procedures.  Most recently she had an ERCP on  for this ampullary adenoma.  She had a bile duct and pancreatic stent placed.  There was sludge and debris in the bile duct.  The distal bile duct was sampled and ablation was performed in the major papilla in the distal common bile duct.  Pathology revealed fragments of adenoma with high-grade dysplasia.  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  She comes in now to discuss further therapy.  Her appetite is good.  No unintentional weight loss.  No personal history of pancreatitis.  No family history of pancreas cancer.  Her father did have some type of malignancy but  from diabetes.  She has 2 cousins with breast cancer.  She does drink 2 beers a day for many years as well as an occasional glass of wine.  Over the weekend she may drink 3-4 beers a day.  She has no history of cirrhosis.  She has not had any cross-sectional imaging.    Review of Systems  Complete ROS Surg Onc:   Constitutional: The patient denies new or recent history of general fatigue, no recent weight loss, no change in appetite.   Eyes: No complaints of visual problems, no scleral icterus.   ENT: no complaints of ear pain, no hoarseness, no difficulty swallowing,  no tinnitus and no new masses in head, oral cavity, or neck.   Cardiovascular: No complaints of chest  pain, no palpitations, no ankle edema.   Respiratory: No complaints of shortness of breath, no cough.   Gastrointestinal: No complaints of jaundice, no bloody stools, no pale stools.   Genitourinary: No complaints of dysuria, no hematuria, no nocturia, no frequent urination, no urethral discharge.   Musculoskeletal: No complaints of weakness, paralysis, joint stiffness or arthralgias.  Integumentary: No complaints of rash, no new lesions.   Neurological: No complaints of convulsions, no seizures, no dizziness.   Hematologic/Lymphatic: No complaints of easy bruising.   Endocrine:  No hot or cold intolerance.  No polydipsia, polyphagia, or polyuria.  Allergy/immunology:  No environmental allergies.  No food allergies.  Not immunocompromised.  Skin:  No pallor or rash.  No wound.          Patient Active Problem List   Diagnosis    Bile duct abnormality    Tubular adenoma of colon    Seborrheic keratosis    Pure hypercholesterolemia    Osteoporosis without current pathological fracture    Primary osteoarthritis    Colon polyp    Asymptomatic postmenopausal status    Atrophic vaginitis    Pseudoangiomatous stromal hyperplasia of breast    Intraductal papilloma of breast, right    CKD (chronic kidney disease) stage 2, GFR 60-89 ml/min    Ampullary adenoma     Past Medical History:   Diagnosis Date    Colon polyp     Medical history reviewed with no changes     Osteoporosis     Thyroglossal cyst      Past Surgical History:   Procedure Laterality Date    BREAST BIOPSY Left 2018    benign    CATARACT EXTRACTION      COLONOSCOPY      2017    CYSTOSCOPY  07/06/2021    Dr. Jeannette Hadley     DENTAL IMPLANT      EGD  09/27/2022    ERCP  03/16/2021    Bile duct abnormality    UT MASTECTOMY PARTIAL Right 01/11/2024    Procedure: RIGHT BREAST SAIGE  DIRECTED LUMPECTOMY;  Surgeon: Tj Seo MD;  Location: [facility] OR;  Service: Surgical Oncology    THROAT SURGERY      UPPER GASTROINTESTINAL ENDOSCOPY  03/16/2021     Bile duct abnormality    US BREAST CLIP NEEDLE LOC RIGHT Right 2024    US BREAST CYST ASPIRATION LEFT INITIAL Left 2017    US GUIDED BREAST BIOPSY RIGHT COMPLETE Right 2023     Family History   Problem Relation Age of Onset    Dementia Mother     Diabetes Father     No Known Problems Maternal Aunt     No Known Problems Maternal Aunt     No Known Problems Maternal Aunt     Leukemia Maternal Grandmother     Diabetes Paternal Grandmother     Breast cancer Cousin 45    Breast cancer Cousin 45     Social History     Socioeconomic History    Marital status:      Spouse name: Not on file    Number of children: Not on file    Years of education: Not on file    Highest education level: Not on file   Occupational History    Not on file   Tobacco Use    Smoking status: Former     Current packs/day: 0.00     Average packs/day: 0.3 packs/day for 33.2 years (8.3 ttl pk-yrs)     Types: Cigarettes     Start date: 3/17/1971     Quit date: 3/17/2004     Years since quittin.7     Passive exposure: Past    Smokeless tobacco: Never    Tobacco comments:     quit    Vaping Use    Vaping status: Never Used   Substance and Sexual Activity    Alcohol use: Yes     Alcohol/week: 14.0 standard drinks of alcohol     Types: 14 Cans of beer per week     Comment: 2  beers per day    Drug use: Never    Sexual activity: Not Currently     Partners: Female     Birth control/protection: Post-menopausal   Other Topics Concern    Not on file   Social History Narrative    Not on file     Social Drivers of Health     Financial Resource Strain: Low Risk  (2024)    Overall Financial Resource Strain (CARDIA)     Difficulty of Paying Living Expenses: Not hard at all   Food Insecurity: Not on file   Transportation Needs: No Transportation Needs (2024)    PRAPARE - Transportation     Lack of Transportation (Medical): No     Lack of Transportation (Non-Medical): No   Physical Activity: Not on file   Stress: No Stress  Concern Present (12/23/2022)    [place] [employer] - Occupational Stress Questionnaire     Feeling of Stress : Not at all   Social Connections: Not on file   Intimate Partner Violence: Not on file   Housing Stability: Not on file       Current Outpatient Medications:     calcium carbonate (OS-CARTER) 600 MG tablet, Take 600 mg by mouth daily, Disp: , Rfl:     Cholecalciferol (VITAMIN D3) 1000 units CAPS, Take by mouth in the morning, Disp: , Rfl:     COLLAGEN PO, Take 5,000 mcg by mouth daily, Disp: , Rfl:     folic acid (FOLVITE) 1 mg tablet, Take by mouth daily, Disp: , Rfl:     VITAMIN K PO, Take by mouth, Disp: , Rfl:   No Known Allergies  Vitals:    12/26/24 0928   BP: 120/82   Pulse: 85   Resp: 18   Temp: 97.5 °F (36.4 °C)   SpO2: 99%       Physical Exam   Constitutional: General appearance: The Patient is well-developed and well-nourished who appears the stated age in no acute distress. Patient is pleasant and talkative.     HEENT:  Normocephalic.  Sclerae are anicteric. Mucous membranes are moist. Neck is supple without adenopathy. No JVD.     Chest: The lungs are clear to auscultation.     Cardiac: Heart is regular rate.     Abdomen: Abdomen is soft, non-tender, non-distended and without masses.     Extremities: There is no clubbing or cyanosis. There is no edema.  Symmetric.  Neuro: Grossly nonfocal. Gait is normal.     Lymphatic: No evidence of cervical adenopathy bilaterally.   No evidence of axillary adenopathy bilaterally.     Skin: Warm, anicteric.    Psych:  Patient is pleasant and talkative.  Breasts:      Pathology:  [unfilled]    Labs:      Imaging  FL ERCP biliary only  Result Date: 12/18/2024  Narrative: ERCP INDICATION:  D13.5: Benign neoplasm of extrahepatic bile ducts. Ampullary adenoma. COMPARISON: ERCP from 6/4/2024 and abdominal MRI from 3/7/2021. IMAGES: 10 FLUOROSCOPY TIME: 58 seconds CONTRAST: 5 mL of iohexol (OMNIPAQUE) FINDINGS: Fluoroscopic guidance was provided  for performance of ERCP by the technologist. The interpreting radiologist was not present for the procedure. At the beginning of the study a short plastic stent was seen in the region of the main pancreatic duct, which was subsequently removed. The provided images demonstrate a guidewire placed into the CBD with injection of contrast material. A balloon sweep was performed. A metallic stent is seen in the region of the CBD and a plastic stent is seen in the region of the main pancreatic duct at the end of the study.     Impression: Fluoroscopic guidance for ERCP. Please see procedure report for full details. Workstation performed: [id number]     ERCP  Result Date: 12/17/2024  Narrative: Table formatting from the original result was not included. [facility] [address] [facility] [address] [phone] DATE OF SERVICE: 12/17/24 PHYSICIAN(S): Attending: Ten Rodriguez MD Fellow: No Staff Documented INDICATION: Ampullary adenoma, Bile duct abnormality POST-OP DIAGNOSIS: See the impression below. PREPROCEDURE: Informed consent was obtained for the procedure, including sedation.  Risks of perforation, hemorrhage, adverse drug reaction and aspiration were discussed. The patient was placed in the left lateral decubitus position. Patient was explained about the risks and benefits of the procedure. Risks including but not limited to bleeding, infection, and perforation were explained in detail. Also explained about less than 100% sensitivity with the exam and other alternatives. PROCEDURE: ERCP DETAILS OF PROCEDURE: Patient was taken to the procedure room where a time out was performed to confirm correct patient and correct procedure. The patient underwent general anesthesia, which was administered by an anesthesia professional. The patient's blood pressure, heart rate, level of consciousness, respirations, oxygen, ECG and ETCO2 were monitored throughout the procedure. The scope was introduced  "through the mouth and advanced to the second part of the duodenum. Clinical intention was achieved. The patient experienced no blood loss. The procedure was not difficult. The patient tolerated the procedure well. There were no apparent adverse events. ANESTHESIA INFORMATION: ASA: II Anesthesia Type: Anesthesia type not filed in the log. MEDICATIONS: indomethacin (INDOCIN) rectal suppository 100 mg (Totals for administrations occurring from 0902 to 1010 on 12/17/24) FINDINGS: Limited views of the esophagus, stomach, duodenum and major papilla appeared normal. There was resistance passing the side viewing duodenoscope past the upper esophageal sphincter from the cricopharynx.  An upper endoscope was then used to facilitate passage, so an EGD was performed. Performed balloon dilation in the cricopharynx. The dilator was expanded from 10 mm starting size to 12 mm ending size. Dilation caused improved passage of the scope. The stomach and duodenum appeared normal. Patent 10 mm x 4 cm fully covered metal stent was visualized in the common bile duct. The stent was successfully removed using forceps Patent 5 Fr x 3 cm straight plastic stent was visualized in the pancreatic duct. The stent was successfully removed using a snare The common bile duct was deeply cannulated after 1 attempt using a traction sphincterotome with 260 cm x 0.035\" straight guidewire. Cannulation was not difficult. Bile duct was normal in diameter with no filling defects or soft tissue identified on cholangiogram. Sweeping was performed in the proximal common bile duct using a 15 mm balloon. Sludge and debris were removed. Using biopsy forceps the papillary area in the distal duct was sampled, the rim of the major papilla, and the distal duct at the 2-3 o'clock positions.  Tissue was sent to pathology for review. Ablation was performed in the major papilla and distal common bile duct. The lesion was completely ablated with 2 applications of " radiofrequency ablation (10 W). Ablation of the distal bile duct was performed using Habib catheter for a total of 120 seconds at 10 espino (higher power) 10 mm x 4 cm fully covered metal stent was placed in the common bile duct 5 Fr x 3 cm straight plastic stent was placed in the pancreatic duct SPECIMENS: ID Type Source Tests Collected by Time Destination 1 : rim of major papilla - 3 o'clock Tissue Ampulla of Vater TISSUE EXAM Eladio Norman MD 12/17/2024  9:41 AM  2 : distal bile duct - capillary projection 2 o'clock Tissue Common Bile Duct TISSUE EXAM Eladio Norman MD 12/17/2024  9:42 AM  3 : very distal bile duct Tissue Common Bile Duct TISSUE EXAM Eladio Norman MD 12/17/2024  9:44 AM       Impression: Performed balloon dilation in the cricopharynx.. The dilator was expanded to 12 mm ending size. Dilation caused improved passage of the scope. Patent 10 mm x 4 cm fully covered stent was visualized in the common bile duct. The stent was removed Patent 5 Fr x 3 cm straight stent was visualized in the pancreatic duct. The stent was removed Sweeping was performed in the proximal common bile duct. Sludge and debris were removed. Using biopsy forceps the papillary area in the distal duct was sampled, the rim of the major papilla, and the distal duct at the 2-3 o'clock positions.  Tissue was sent to pathology for review. Tissue in the major papilla and distal common bile duct was completely ablated with radiofrequency ablation 10 mm x 4 cm fully covered stent was placed in the common bile duct 5 Fr x 3 cm straight stent was placed in the pancreatic duct RECOMMENDATION: Follow up biopsy forceps Consult to surgical oncology. Referral placed Repeat ERCP in 2-3 months  Ten Rodriguez MD Suggested CPT codes: 15982, 43276x2, 71368, 46116     Mammo screening bilateral w 3d and cad  Result Date: 12/2/2024  Narrative: DIAGNOSIS: Abnormal mammogram; Encounter for screening mammogram for malignant neoplasm of breast TECHNIQUE: Digital  screening mammography was performed. Computer Aided Detection (CAD) analyzed all applicable images. COMPARISONS: Multiple prior exams dated between 2/18/2013 and 1/11/2024. RELEVANT HISTORY: Family Breast Cancer History: History of breast cancer in Cousin, Cousin. Family Medical History: Family medical history includes breast cancer in 2 relatives (cousin, cousin). Personal History: No known relevant hormone history. Surgical history includes breast biopsy and lumpectomy. No known relevant medical history. The patient is scheduled in a reminder system for screening mammography. 8-10% of cancers will be missed on mammography. Management of a palpable abnormality must be based on clinical grounds.  Patients will be notified of their results via letter from our facility. Accredited by American College of Radiology and FDA. RISK ASSESSMENT: 5 Year Tyrer-Cuzick: 0.57% 10 Year Tyrer-Cuzick: 1.22% Lifetime Tyrer-Cuzick: 1.95% TISSUE DENSITY: The breasts are heterogeneously dense, which may obscure small masses. INDICATION: Alicia Rai is a 69 y.o. female presenting for screening mammography. FINDINGS: Bilateral There are no suspicious masses, grouped microcalcifications or areas of unexplained architectural distortion. The skin and nipple areolar complex are unremarkable.  Biopsy marker clip is noted in the left breast.     Impression: No mammographic evidence of malignancy. ASSESSMENT/BI-RADS CATEGORY: Left: 2 - Benign Right: 2 - Benign Overall: 2 - Benign RECOMMENDATION:      - Routine screening mammogram in 1 year for both breasts. Workstation ID: [id number] Signed by:  Shahid Santoro MD     I personally reviewed and interpreted the above laboratory and imaging data.

## 2024-12-26 NOTE — ASSESSMENT & PLAN NOTE
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  There is no obvious evidence of cancer.  It is unclear the extent of any involvement of her bile or pancreatic duct.  I have recommended obtaining an MRI with MRCP at this time to better evaluate the extent of disease.  If there is no obvious disease seen on MRI, we did discuss surgical intervention since she has been following this adenoma for many years and the area has not been completely cleared.  We also discussed continued short-term follow-up which is what she is in favor of since there is no cancer.  We discussed pancreaticoduodenectomy if needed.  We discussed the risks associated with this procedure.  She is also going to try to decrease her alcohol intake to see if this will decrease the creation of her adenomas as she has had tubular adenomas in her colon as well.  I think this is certainly reasonable.  I will see her back once we have the MRI.  At that time further recommendations will be based on those results.  Her main concern is time under anesthesia since her mother had significant difficulties recovering mentally after a procedure in her 70s.  The patient does attribute this to prolonged anesthesia.  If we would proceed with surgery we would have her see surgical optimization as well as preoperative discussion with anesthesia given these concerns.  She is agreeable to this plan.  All her questions were answered.

## 2025-01-06 ENCOUNTER — RESULTS FOLLOW-UP (OUTPATIENT)
Dept: GASTROENTEROLOGY | Facility: CLINIC | Age: 70
End: 2025-01-06

## 2025-01-21 ENCOUNTER — HOSPITAL ENCOUNTER (OUTPATIENT)
Dept: MRI IMAGING | Facility: HOSPITAL | Age: 70
Discharge: HOME | End: 2025-01-21
Attending: SURGERY
Payer: COMMERCIAL

## 2025-01-21 DIAGNOSIS — D13.5 AMPULLARY ADENOMA: ICD-10-CM

## 2025-01-21 PROCEDURE — A9585 GADOBUTROL INJECTION: HCPCS | Performed by: SURGERY

## 2025-01-21 PROCEDURE — 74183 MRI ABD W/O CNTR FLWD CNTR: CPT

## 2025-01-21 RX ORDER — GADOBUTROL 604.72 MG/ML
5 INJECTION INTRAVENOUS
Status: COMPLETED | OUTPATIENT
Start: 2025-01-21 | End: 2025-01-21

## 2025-01-21 RX ADMIN — GADOBUTROL 5 ML: 604.72 INJECTION INTRAVENOUS at 21:12

## 2025-01-30 ENCOUNTER — OFFICE VISIT (OUTPATIENT)
Dept: SURGICAL ONCOLOGY | Facility: CLINIC | Age: 70
End: 2025-01-30
Payer: COMMERCIAL

## 2025-01-30 ENCOUNTER — PREP FOR PROCEDURE (OUTPATIENT)
Dept: GASTROENTEROLOGY | Facility: CLINIC | Age: 70
End: 2025-01-30

## 2025-01-30 VITALS
SYSTOLIC BLOOD PRESSURE: 112 MMHG | DIASTOLIC BLOOD PRESSURE: 70 MMHG | WEIGHT: 119 LBS | BODY MASS INDEX: 21.9 KG/M2 | HEART RATE: 85 BPM | TEMPERATURE: 97.7 F | OXYGEN SATURATION: 96 % | HEIGHT: 62 IN | RESPIRATION RATE: 15 BRPM

## 2025-01-30 DIAGNOSIS — D13.5 AMPULLARY ADENOMA: Primary | ICD-10-CM

## 2025-01-30 PROCEDURE — 99215 OFFICE O/P EST HI 40 MIN: CPT | Performed by: SURGERY

## 2025-01-30 RX ORDER — SODIUM CHLORIDE, SODIUM LACTATE, POTASSIUM CHLORIDE, CALCIUM CHLORIDE 600; 310; 30; 20 MG/100ML; MG/100ML; MG/100ML; MG/100ML
125 INJECTION, SOLUTION INTRAVENOUS CONTINUOUS
OUTPATIENT
Start: 2025-01-30

## 2025-01-30 NOTE — PROGRESS NOTES
Surgical Oncology Follow Up       [facility]  [address]  [address]  [place] [address]  [phone]    Alicia Rai  1955  [id number]  [facility]  [facility]  [address]  [address]  [place] [address]  [phone]    1. 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 There is no obvious evidence of cancer. It is unclear the extent of any involvement of her bile or pancreatic duct.  The MRI shows no obvious masses in this region, although there was some artifact from her biliary stent.  I discussed surgical intervention which may require pancreaticoduodenectomy.  We also discussed short-term follow-up.  We discussed the pros and cons of each approach.  Given that she has high-grade dysplasia, I would be comfortable approaching this surgically.  She is somewhat hesitant to have surgery and is in favor of short-term observation.  I did discuss that short-term observation is certainly reasonable, but if she has another biopsy with high-grade dysplasia I would strongly favor surgery.  If all her dysplasia is low-grade, she could continue surveillance.  I did discuss that I doubt high-grade dysplasia would regress and it would be more than likely that the area of high-grade dysplasia was completely resected with her endoscopic procedures.  She will contact gastroenterology for another endoscopic procedure.  I will tentatively see her in 3 months with another MRI. Once again,  her main concern is time under anesthesia since her mother had significant difficulties recovering mentally after a procedure in her 70s. The patient does attribute this to prolonged anesthesia. If we would proceed with surgery we would have her see surgical optimization as well as  preoperative discussion with anesthesia given these concerns. She is agreeable to this plan. All her questions were answered.   Orders:  -     BUN; Future; Expected date: 04/15/2025  -     Creatinine, serum; Future; Expected date: 04/15/2025  -     MRI abdomen w wo contrast; Future; Expected date: 2025         Chief Complaint   Patient presents with    Follow-up       Return in about 3 months (around 2025) for [facility] visit short, Imaging - See orders.      Oncology History    No history exists.           History of Present Illness:  69-year-old female with a longstanding history of an ampullary adenoma.  These have been resected and ablated for several years.  She was found to have focal high-grade dysplasia in 2021 and then has had serial procedures.  Most recently she had an ERCP on  for this ampullary adenoma.  She had a bile duct and pancreatic stent placed.  There was sludge and debris in the bile duct.  The distal bile duct was sampled and ablation was performed in the major papilla in the distal common bile duct.  Pathology revealed fragments of adenoma with high-grade dysplasia.  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  She comes in now to discuss further therapy.  Her appetite is good.  No unintentional weight loss.  No personal history of pancreatitis.  No family history of pancreas cancer.  Her father did have some type of malignancy but  from diabetes.  She has 2 cousins with breast cancer.  She does drink 2 beers a day for many years as well as an occasional glass of wine.  Over the weekend she may drink 3-4 beers a day.  She has no history of cirrhosis.  She has not had any cross-sectional imaging.     Internal history: Follow-up MRI from 2025 reveals artifact from the bile duct stent.  There is pneumobilia.  There are no filling defects or masses.  I personally reviewed the films.  She continues to feel well.    Review of  Systems  Complete ROS Surg Onc:   Complete ROS Surg Onc:   Constitutional: The patient denies new or recent history of general fatigue, no recent weight loss, no change in appetite.   Eyes: No complaints of visual problems, no scleral icterus.   ENT: no complaints of ear pain, no hoarseness, no difficulty swallowing,  no tinnitus and no new masses in head, oral cavity, or neck.   Cardiovascular: No complaints of chest pain, no palpitations, no ankle edema.   Respiratory: No complaints of shortness of breath, no cough.   Gastrointestinal: No complaints of jaundice, no bloody stools, no pale stools.   Genitourinary: No complaints of dysuria, no hematuria, no nocturia, no frequent urination, no urethral discharge.   Musculoskeletal: No complaints of weakness, paralysis, joint stiffness or arthralgias.  Integumentary: No complaints of rash, no new lesions.   Neurological: No complaints of convulsions, no seizures, no dizziness.   Hematologic/Lymphatic: No complaints of easy bruising.   Endocrine:  No hot or cold intolerance.  No polydipsia, polyphagia, or polyuria.  Allergy/immunology:  No environmental allergies.  No food allergies.  Not immunocompromised.  Skin:  No pallor or rash.  No wound.        Patient Active Problem List   Diagnosis    Bile duct abnormality    Tubular adenoma of colon    Seborrheic keratosis    Pure hypercholesterolemia    Osteoporosis without current pathological fracture    Primary osteoarthritis    Colon polyp    Asymptomatic postmenopausal status    Atrophic vaginitis    Pseudoangiomatous stromal hyperplasia of breast    Intraductal papilloma of breast, right    CKD (chronic kidney disease) stage 2, GFR 60-89 ml/min    Ampullary adenoma     Past Medical History:   Diagnosis Date    Colon polyp     Medical history reviewed with no changes     Osteoporosis     Thyroglossal cyst      Past Surgical History:   Procedure Laterality Date    BREAST BIOPSY Left 2018    benign    CATARACT EXTRACTION       COLONOSCOPY      2017    CYSTOSCOPY  2021    Dr. Jeannette Hadley     DENTAL IMPLANT      EGD  2022    ERCP  2021    Bile duct abnormality    ME MASTECTOMY PARTIAL Right 2024    Procedure: RIGHT BREAST SAIGE  DIRECTED LUMPECTOMY;  Surgeon: Tj Seo MD;  Location: [facility];  Service: Surgical Oncology    THROAT SURGERY      UPPER GASTROINTESTINAL ENDOSCOPY  2021    Bile duct abnormality    US BREAST CLIP NEEDLE LOC RIGHT Right 2024    US BREAST CYST ASPIRATION LEFT INITIAL Left 2017    US GUIDED BREAST BIOPSY RIGHT COMPLETE Right 2023     Family History   Problem Relation Age of Onset    Dementia Mother     Diabetes Father     No Known Problems Maternal Aunt     No Known Problems Maternal Aunt     No Known Problems Maternal Aunt     Leukemia Maternal Grandmother     Diabetes Paternal Grandmother     Breast cancer Cousin 45    Breast cancer Cousin 45     Social History     Socioeconomic History    Marital status:      Spouse name: Not on file    Number of children: Not on file    Years of education: Not on file    Highest education level: Not on file   Occupational History    Not on file   Tobacco Use    Smoking status: Former     Current packs/day: 0.00     Average packs/day: 0.3 packs/day for 33.2 years (8.3 ttl pk-yrs)     Types: Cigarettes     Start date: 3/17/1971     Quit date: 3/17/2004     Years since quittin.8     Passive exposure: Past    Smokeless tobacco: Never    Tobacco comments:     quit    Vaping Use    Vaping status: Never Used   Substance and Sexual Activity    Alcohol use: Yes     Alcohol/week: 14.0 standard drinks of alcohol     Types: 14 Cans of beer per week     Comment: 2  beers per day    Drug use: Never    Sexual activity: Not Currently     Partners: Female     Birth control/protection: Post-menopausal   Other Topics Concern    Not on file   Social History Narrative    Not on file     Social Drivers of  Health     Financial Resource Strain: Low Risk  (2/16/2024)    Overall Financial Resource Strain (CARDIA)     Difficulty of Paying Living Expenses: Not hard at all   Food Insecurity: Not on file   Transportation Needs: No Transportation Needs (2/16/2024)    PRAPARE - Transportation     Lack of Transportation (Medical): No     Lack of Transportation (Non-Medical): No   Physical Activity: Not on file   Stress: No Stress Concern Present (12/23/2022)    [employer] - Occupational Stress Questionnaire     Feeling of Stress : Not at all   Social Connections: Not on file   Intimate Partner Violence: Not on file   Housing Stability: Not on file       Current Outpatient Medications:     calcium carbonate (OS-CARTER) 600 MG tablet, Take 600 mg by mouth daily, Disp: , Rfl:     Cholecalciferol (VITAMIN D3) 1000 units CAPS, Take by mouth in the morning, Disp: , Rfl:     COLLAGEN PO, Take 5,000 mcg by mouth daily, Disp: , Rfl:     folic acid (FOLVITE) 1 mg tablet, Take by mouth daily, Disp: , Rfl:     VITAMIN K PO, Take by mouth, Disp: , Rfl:   No Known Allergies  Vitals:    01/30/25 1341   BP: 112/70   Pulse: 85   Resp: 15   Temp: 97.7 °F (36.5 °C)   SpO2: 96%       Physical Exam  Constitutional: General appearance: The Patient is well-developed and well-nourished who appears the stated age in no acute distress. Patient is pleasant and talkative.     HEENT:  Normocephalic.  Sclerae are anicteric. Mucous membranes are moist. Neck is supple without adenopathy. No JVD.     Chest: The lungs are clear to auscultation.     Cardiac: Heart is regular rate.     Abdomen: Abdomen is soft, non-tender, non-distended and without masses.     Extremities: There is no clubbing or cyanosis. There is no edema.  Symmetric.  Neuro: Grossly nonfocal. Gait is normal.     Lymphatic: No evidence of cervical adenopathy bilaterally.   Skin: Warm, anicteric.    Psych:  Patient is pleasant and  talkative.  Breasts:        Pathology:  [unfilled]    Labs:      Imaging  MRI abdomen w wo contrast and mrcp  Result Date: 1/22/2025  Narrative: MRI OF THE ABDOMEN WITH AND WITHOUT CONTRAST  WITH MRCP INDICATION: 69 years / Female. D13.5: Benign neoplasm of extrahepatic bile ducts. Per surgical oncology clinical notes: History of ampullary adenoma status post resection. Focal high-grade dysplasia. Study to evaluate involvement of bile or pancreatic duct. History of biliary and pancreatic stenting. COMPARISON: 3/7/2021 MRI and 2/17/2021 CT. 12/17/2024 ERCP images. TECHNIQUE: Multiplanar/multisequence MRI of the abdomen with MRCP was performed before and after administration of contrast. IV Contrast: 5 mL of Gadobutrol injection (SINGLE-DOSE) Image quality: Diagnostic. FINDINGS: LOWER CHEST:   Within normal limits. LIVER: Normal signal intensity and morphology. No suspicious mass. Patent hepatic and portal veins. BILE DUCTS: Signal loss and artifact from bile duct stent and from crossing vessel at the confluence. Evidence of fluid level/pneumobilia in the region of extrahepatic duct stent on axial images. Otherwise no filling defects or masses. No dilation. GALLBLADDER: Fluid level/pneumobilia. Otherwise within normal limits. SPLEEN:  Within normal limits. PANCREAS: Normal parenchymal appearance. Mild proximal duct attenuation in the region of stent. Otherwise normal duct caliber and morphology. ADRENAL GLANDS:  Within normal limits. KIDNEYS/PROXIMAL URETERS: Similar tiny, left greater than right cortical cysts. BOWEL: Mild colonic diverticulosis. Normal caliber and wall thickness. PERITONEUM/RETROPERITONEUM:  No ascites or fluid collections. LYMPH NODES:  No enlarged nodes. VASCULAR STRUCTURES:  Within normal limits. BONES:  No suspicious osseous lesion. ABDOMINAL WALL:  Within normal limits.     Impression: Pancreatic and biliary stents. No evidence of neoplasm. Workstation performed: [id number]     I personally  reviewed and interpreted the above laboratory and imaging data.

## 2025-01-30 NOTE — ASSESSMENT & PLAN NOTE
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 There is no obvious evidence of cancer. It is unclear the extent of any involvement of her bile or pancreatic duct.  The MRI shows no obvious masses in this region, although there was some artifact from her biliary stent.  I discussed surgical intervention which may require pancreaticoduodenectomy.  We also discussed short-term follow-up.  We discussed the pros and cons of each approach.  Given that she has high-grade dysplasia, I would be comfortable approaching this surgically.  She is somewhat hesitant to have surgery and is in favor of short-term observation.  I did discuss that short-term observation is certainly reasonable, but if she has another biopsy with high-grade dysplasia I would strongly favor surgery.  If all her dysplasia is low-grade, she could continue surveillance.  I did discuss that I doubt high-grade dysplasia would regress and it would be more than likely that the area of high-grade dysplasia was completely resected with her endoscopic procedures.  She will contact gastroenterology for another endoscopic procedure.  I will tentatively see her in 3 months with another MRI. Once again,  her main concern is time under anesthesia since her mother had significant difficulties recovering mentally after a procedure in her 70s. The patient does attribute this to prolonged anesthesia. If we would proceed with surgery we would have her see surgical optimization as well as preoperative discussion with anesthesia given these concerns. She is agreeable to this plan. All her questions were answered.

## 2025-01-30 NOTE — LETTER
January 30, 2025     Russel Meyer MD  [address] [place] [address] [place] [address]    Patient: Alicia Rai   YOB: 1955   Date of Visit: 1/30/2025       Dear Dr. Meyer:    Thank you for referring Alicia Rai to me for evaluation. Below are my notes for this consultation.    If you have questions, please do not hesitate to call me. I look forward to following your patient along with you.         Sincerely,        Juan Patel MD        CC: MD Juan Burgos MD  1/30/2025  2:42 PM  Sign when Signing Visit               Surgical Oncology Follow Up       [facility]  [address]  [address]  [place] [address]  [phone]    Alicia Rai  1955  [id number]  [facility]  [address]  [address]  [place] [address]  [phone]    1. 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 There is no obvious evidence of cancer. It is unclear the extent of any involvement of her bile or pancreatic duct.  The MRI shows no obvious masses in this region, although there was some artifact from her biliary stent.  I discussed surgical intervention which may require pancreaticoduodenectomy.  We also discussed short-term follow-up.  We discussed the pros and cons of each approach.  Given that she has high-grade dysplasia, I would be comfortable approaching this surgically.  She is somewhat hesitant to have surgery and is in favor of short-term observation.  I did discuss that short-term observation is certainly reasonable, but if she has another biopsy with high-grade dysplasia I would strongly favor surgery.  If all her dysplasia is low-grade, she could continue surveillance.  I did discuss that I doubt high-grade dysplasia would  regress and it would be more than likely that the area of high-grade dysplasia was completely resected with her endoscopic procedures.  She will contact gastroenterology for another endoscopic procedure.  I will tentatively see her in 3 months with another MRI. Once again,  her main concern is time under anesthesia since her mother had significant difficulties recovering mentally after a procedure in her 70s. The patient does attribute this to prolonged anesthesia. If we would proceed with surgery we would have her see surgical optimization as well as preoperative discussion with anesthesia given these concerns. She is agreeable to this plan. All her questions were answered.   Orders:  -     BUN; Future; Expected date: 04/15/2025  -     Creatinine, serum; Future; Expected date: 04/15/2025  -     MRI abdomen w wo contrast; Future; Expected date: 04/30/2025         Chief Complaint   Patient presents with   • Follow-up       Return in about 3 months (around 4/30/2025) for Ofice visit short, Imaging - See orders.      Oncology History    No history exists.           History of Present Illness:  69-year-old female with a longstanding history of an ampullary adenoma.  These have been resected and ablated for several years.  She was found to have focal high-grade dysplasia in August 2021 and then has had serial procedures.  Most recently she had an ERCP on December 17 for this ampullary adenoma.  She had a bile duct and pancreatic stent placed.  There was sludge and debris in the bile duct.  The distal bile duct was sampled and ablation was performed in the major papilla in the distal common bile duct.  Pathology revealed fragments of adenoma with high-grade dysplasia.  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  She comes in now to discuss further therapy.  Her appetite is good.  No unintentional weight loss.  No personal history of pancreatitis.  No family history of pancreas cancer.  Her  father did have some type of malignancy but  from diabetes.  She has 2 cousins with breast cancer.  She does drink 2 beers a day for many years as well as an occasional glass of wine.  Over the weekend she may drink 3-4 beers a day.  She has no history of cirrhosis.  She has not had any cross-sectional imaging.     Internal history: Follow-up MRI from 2025 reveals artifact from the bile duct stent.  There is pneumobilia.  There are no filling defects or masses.  I personally reviewed the films.  She continues to feel well.    Review of Systems  Complete ROS Surg Onc:   Complete ROS Surg Onc:   Constitutional: The patient denies new or recent history of general fatigue, no recent weight loss, no change in appetite.   Eyes: No complaints of visual problems, no scleral icterus.   ENT: no complaints of ear pain, no hoarseness, no difficulty swallowing,  no tinnitus and no new masses in head, oral cavity, or neck.   Cardiovascular: No complaints of chest pain, no palpitations, no ankle edema.   Respiratory: No complaints of shortness of breath, no cough.   Gastrointestinal: No complaints of jaundice, no bloody stools, no pale stools.   Genitourinary: No complaints of dysuria, no hematuria, no nocturia, no frequent urination, no urethral discharge.   Musculoskeletal: No complaints of weakness, paralysis, joint stiffness or arthralgias.  Integumentary: No complaints of rash, no new lesions.   Neurological: No complaints of convulsions, no seizures, no dizziness.   Hematologic/Lymphatic: No complaints of easy bruising.   Endocrine:  No hot or cold intolerance.  No polydipsia, polyphagia, or polyuria.  Allergy/immunology:  No environmental allergies.  No food allergies.  Not immunocompromised.  Skin:  No pallor or rash.  No wound.        Patient Active Problem List   Diagnosis   • Bile duct abnormality   • Tubular adenoma of colon   • Seborrheic keratosis   • Pure hypercholesterolemia   • Osteoporosis without  current pathological fracture   • Primary osteoarthritis   • Colon polyp   • Asymptomatic postmenopausal status   • Atrophic vaginitis   • Pseudoangiomatous stromal hyperplasia of breast   • Intraductal papilloma of breast, right   • CKD (chronic kidney disease) stage 2, GFR 60-89 ml/min   • Ampullary adenoma     Past Medical History:   Diagnosis Date   • Colon polyp    • Medical history reviewed with no changes    • Osteoporosis    • Thyroglossal cyst      Past Surgical History:   Procedure Laterality Date   • BREAST BIOPSY Left 2018    benign   • CATARACT EXTRACTION     • COLONOSCOPY      2017   • CYSTOSCOPY  07/06/2021    Dr. Jeannette Hadley    • DENTAL IMPLANT     • EGD  09/27/2022   • ERCP  03/16/2021    Bile duct abnormality   • MN MASTECTOMY PARTIAL Right 01/11/2024    Procedure: RIGHT BREAST SAIGE  DIRECTED LUMPECTOMY;  Surgeon: Tj Seo MD;  Location: [facility];  Service: Surgical Oncology   • THROAT SURGERY     • UPPER GASTROINTESTINAL ENDOSCOPY  03/16/2021    Bile duct abnormality   • US BREAST CLIP NEEDLE LOC RIGHT Right 01/04/2024   • US BREAST CYST ASPIRATION LEFT INITIAL Left 12/12/2017   • US GUIDED BREAST BIOPSY RIGHT COMPLETE Right 12/02/2023     Family History   Problem Relation Age of Onset   • Dementia Mother    • Diabetes Father    • No Known Problems Maternal Aunt    • No Known Problems Maternal Aunt    • No Known Problems Maternal Aunt    • Leukemia Maternal Grandmother    • Diabetes Paternal Grandmother    • Breast cancer Cousin 45   • Breast cancer Cousin 45     Social History     Socioeconomic History   • Marital status:      Spouse name: Not on file   • Number of children: Not on file   • Years of education: Not on file   • Highest education level: Not on file   Occupational History   • Not on file   Tobacco Use   • Smoking status: Former     Current packs/day: 0.00     Average packs/day: 0.3 packs/day for 33.2 years (8.3 ttl pk-yrs)     Types: Cigarettes      Start date: 3/17/1971     Quit date: 3/17/2004     Years since quittin.8     Passive exposure: Past   • Smokeless tobacco: Never   • Tobacco comments:     quit    Vaping Use   • Vaping status: Never Used   Substance and Sexual Activity   • Alcohol use: Yes     Alcohol/week: 14.0 standard drinks of alcohol     Types: 14 Cans of beer per week     Comment: 2  beers per day   • Drug use: Never   • Sexual activity: Not Currently     Partners: Female     Birth control/protection: Post-menopausal   Other Topics Concern   • Not on file   Social History Narrative   • Not on file     Social Drivers of Health     Financial Resource Strain: Low Risk  (2024)    Overall Financial Resource Strain (CARDIA)    • Difficulty of Paying Living Expenses: Not hard at all   Food Insecurity: Not on file   Transportation Needs: No Transportation Needs (2024)    PRAPARE - Transportation    • Lack of Transportation (Medical): No    • Lack of Transportation (Non-Medical): No   Physical Activity: Not on file   Stress: No Stress Concern Present (2022)    [employer] - Occupational Stress Questionnaire    • Feeling of Stress : Not at all   Social Connections: Not on file   Intimate Partner Violence: Not on file   Housing Stability: Not on file       Current Outpatient Medications:   •  calcium carbonate (OS-CARTER) 600 MG tablet, Take 600 mg by mouth daily, Disp: , Rfl:   •  Cholecalciferol (VITAMIN D3) 1000 units CAPS, Take by mouth in the morning, Disp: , Rfl:   •  COLLAGEN PO, Take 5,000 mcg by mouth daily, Disp: , Rfl:   •  folic acid (FOLVITE) 1 mg tablet, Take by mouth daily, Disp: , Rfl:   •  VITAMIN K PO, Take by mouth, Disp: , Rfl:   No Known Allergies  Vitals:    25 1341   BP: 112/70   Pulse: 85   Resp: 15   Temp: 97.7 °F (36.5 °C)   SpO2: 96%       Physical Exam  Constitutional: General appearance: The Patient is well-developed and well-nourished who appears the stated age in no  acute distress. Patient is pleasant and talkative.     HEENT:  Normocephalic.  Sclerae are anicteric. Mucous membranes are moist. Neck is supple without adenopathy. No JVD.     Chest: The lungs are clear to auscultation.     Cardiac: Heart is regular rate.     Abdomen: Abdomen is soft, non-tender, non-distended and without masses.     Extremities: There is no clubbing or cyanosis. There is no edema.  Symmetric.  Neuro: Grossly nonfocal. Gait is normal.     Lymphatic: No evidence of cervical adenopathy bilaterally.   Skin: Warm, anicteric.    Psych:  Patient is pleasant and talkative.  Breasts:        Pathology:  [unfilled]    Labs:      Imaging  MRI abdomen w wo contrast and mrcp  Result Date: 1/22/2025  Narrative: MRI OF THE ABDOMEN WITH AND WITHOUT CONTRAST  WITH MRCP INDICATION: 69 years / Female. D13.5: Benign neoplasm of extrahepatic bile ducts. Per surgical oncology clinical notes: History of ampullary adenoma status post resection. Focal high-grade dysplasia. Study to evaluate involvement of bile or pancreatic duct. History of biliary and pancreatic stenting. COMPARISON: 3/7/2021 MRI and 2/17/2021 CT. 12/17/2024 ERCP images. TECHNIQUE: Multiplanar/multisequence MRI of the abdomen with MRCP was performed before and after administration of contrast. IV Contrast: 5 mL of Gadobutrol injection (SINGLE-DOSE) Image quality: Diagnostic. FINDINGS: LOWER CHEST:   Within normal limits. LIVER: Normal signal intensity and morphology. No suspicious mass. Patent hepatic and portal veins. BILE DUCTS: Signal loss and artifact from bile duct stent and from crossing vessel at the confluence. Evidence of fluid level/pneumobilia in the region of extrahepatic duct stent on axial images. Otherwise no filling defects or masses. No dilation. GALLBLADDER: Fluid level/pneumobilia. Otherwise within normal limits. SPLEEN:  Within normal limits. PANCREAS: Normal parenchymal appearance. Mild proximal duct attenuation in the region of  stent. Otherwise normal duct caliber and morphology. ADRENAL GLANDS:  Within normal limits. KIDNEYS/PROXIMAL URETERS: Similar tiny, left greater than right cortical cysts. BOWEL: Mild colonic diverticulosis. Normal caliber and wall thickness. PERITONEUM/RETROPERITONEUM:  No ascites or fluid collections. LYMPH NODES:  No enlarged nodes. VASCULAR STRUCTURES:  Within normal limits. BONES:  No suspicious osseous lesion. ABDOMINAL WALL:  Within normal limits.     Impression: Pancreatic and biliary stents. No evidence of neoplasm. Workstation performed: [id number]     I personally reviewed and interpreted the above laboratory and imaging data.

## 2025-01-31 ENCOUNTER — TELEPHONE (OUTPATIENT)
Dept: GASTROENTEROLOGY | Facility: CLINIC | Age: 70
End: 2025-01-31

## 2025-01-31 NOTE — TELEPHONE ENCOUNTER
Procedure:  ERCP  Scheduled date of procedure (as of today): 4/15/24  Physician performing procedure: Dr. Rodriguez   Location of procedure: [place]   Instructions reviewed with patient by:  Ne ramos   Clearances:  n/a

## 2025-01-31 NOTE — TELEPHONE ENCOUNTER
----- Message from Ne DELONG sent at 1/31/2025  9:45 AM EST -----    ----- Message -----  From: Munira Benites PA-C  Sent: 1/31/2025   8:27 AM EST  To: [facility]    Ready to schedule! Thank you!  ----- Message -----  From: Ten Rodriguez MD  Sent: 1/30/2025   4:31 PM EST  To: Juan Patel MD; [facility]    Thanks, I've been pushing resection cause of the dysplasia and it's not going away but she is very hesitant. Yes, I'll get her in for another ercp in April and will follow the MRI.    Staff: please schedule ERCP in April. I've placed orders.  ----- Message -----  From: Juan Patel MD  Sent: 1/30/2025   2:43 PM EST  To: Ten Rodriguez MD    She is leaning towards short-term observation right now.  I did send you a copy of my note.  Can you schedule another procedure in April, like you had planned.  I was going to repeat the MRI and see her afterwards.  I think she if she has any more high-grade dysplasia we should just plan on resection.

## 2025-02-18 ENCOUNTER — APPOINTMENT (OUTPATIENT)
Age: 70
End: 2025-02-18
Payer: MEDICARE

## 2025-02-18 DIAGNOSIS — E55.9 VITAMIN D INSUFFICIENCY: ICD-10-CM

## 2025-02-18 DIAGNOSIS — F41.1 GENERALIZED ANXIETY DISORDER: ICD-10-CM

## 2025-02-18 DIAGNOSIS — Z13.220 ENCOUNTER FOR LIPID SCREENING FOR CARDIOVASCULAR DISEASE: ICD-10-CM

## 2025-02-18 DIAGNOSIS — Z13.6 ENCOUNTER FOR LIPID SCREENING FOR CARDIOVASCULAR DISEASE: ICD-10-CM

## 2025-02-18 DIAGNOSIS — N18.2 CKD (CHRONIC KIDNEY DISEASE) STAGE 2, GFR 60-89 ML/MIN: ICD-10-CM

## 2025-02-18 LAB
25(OH)D3 SERPL-MCNC: 96.8 NG/ML (ref 30–100)
ALBUMIN SERPL BCG-MCNC: 4.2 G/DL (ref 3.5–5)
ALP SERPL-CCNC: 47 U/L (ref 34–104)
ALT SERPL W P-5'-P-CCNC: 19 U/L (ref 7–52)
ANION GAP SERPL CALCULATED.3IONS-SCNC: 9 MMOL/L (ref 4–13)
AST SERPL W P-5'-P-CCNC: 29 U/L (ref 13–39)
BASOPHILS # BLD AUTO: 0.04 THOUSANDS/ΜL (ref 0–0.1)
BASOPHILS NFR BLD AUTO: 1 % (ref 0–1)
BILIRUB SERPL-MCNC: 0.72 MG/DL (ref 0.2–1)
BUN SERPL-MCNC: 16 MG/DL (ref 5–25)
CALCIUM SERPL-MCNC: 9.9 MG/DL (ref 8.4–10.2)
CHLORIDE SERPL-SCNC: 101 MMOL/L (ref 96–108)
CHOLEST SERPL-MCNC: 231 MG/DL (ref ?–200)
CO2 SERPL-SCNC: 29 MMOL/L (ref 21–32)
CREAT SERPL-MCNC: 0.85 MG/DL (ref 0.6–1.3)
EOSINOPHIL # BLD AUTO: 0.2 THOUSAND/ΜL (ref 0–0.61)
EOSINOPHIL NFR BLD AUTO: 3 % (ref 0–6)
ERYTHROCYTE [DISTWIDTH] IN BLOOD BY AUTOMATED COUNT: 13.1 % (ref 11.6–15.1)
GFR SERPL CREATININE-BSD FRML MDRD: 69 ML/MIN/1.73SQ M
GLUCOSE P FAST SERPL-MCNC: 97 MG/DL (ref 65–99)
HCT VFR BLD AUTO: 42.4 % (ref 34.8–46.1)
HDLC SERPL-MCNC: 68 MG/DL
HGB BLD-MCNC: 13.7 G/DL (ref 11.5–15.4)
IMM GRANULOCYTES # BLD AUTO: 0.02 THOUSAND/UL (ref 0–0.2)
IMM GRANULOCYTES NFR BLD AUTO: 0 % (ref 0–2)
LDLC SERPL CALC-MCNC: 142 MG/DL (ref 0–100)
LYMPHOCYTES # BLD AUTO: 1.74 THOUSANDS/ΜL (ref 0.6–4.47)
LYMPHOCYTES NFR BLD AUTO: 27 % (ref 14–44)
MCH RBC QN AUTO: 29.2 PG (ref 26.8–34.3)
MCHC RBC AUTO-ENTMCNC: 32.3 G/DL (ref 31.4–37.4)
MCV RBC AUTO: 90 FL (ref 82–98)
MONOCYTES # BLD AUTO: 0.48 THOUSAND/ΜL (ref 0.17–1.22)
MONOCYTES NFR BLD AUTO: 7 % (ref 4–12)
NEUTROPHILS # BLD AUTO: 3.98 THOUSANDS/ΜL (ref 1.85–7.62)
NEUTS SEG NFR BLD AUTO: 62 % (ref 43–75)
NRBC BLD AUTO-RTO: 0 /100 WBCS
PLATELET # BLD AUTO: 281 THOUSANDS/UL (ref 149–390)
PMV BLD AUTO: 10.1 FL (ref 8.9–12.7)
POTASSIUM SERPL-SCNC: 4.2 MMOL/L (ref 3.5–5.3)
PROT SERPL-MCNC: 7.2 G/DL (ref 6.4–8.4)
RBC # BLD AUTO: 4.69 MILLION/UL (ref 3.81–5.12)
SODIUM SERPL-SCNC: 139 MMOL/L (ref 135–147)
TRIGL SERPL-MCNC: 104 MG/DL (ref ?–150)
WBC # BLD AUTO: 6.46 THOUSAND/UL (ref 4.31–10.16)

## 2025-02-18 PROCEDURE — 80061 LIPID PANEL: CPT

## 2025-02-18 PROCEDURE — 82172 ASSAY OF APOLIPOPROTEIN: CPT

## 2025-02-18 PROCEDURE — 36415 COLL VENOUS BLD VENIPUNCTURE: CPT

## 2025-02-18 PROCEDURE — 80053 COMPREHEN METABOLIC PANEL: CPT

## 2025-02-18 PROCEDURE — 85025 COMPLETE CBC W/AUTO DIFF WBC: CPT

## 2025-02-18 PROCEDURE — 82306 VITAMIN D 25 HYDROXY: CPT

## 2025-02-19 ENCOUNTER — RESULTS FOLLOW-UP (OUTPATIENT)
Age: 70
End: 2025-02-19

## 2025-02-20 LAB — APO B SERPL-MCNC: 109 MG/DL

## 2025-02-24 ENCOUNTER — RA CDI HCC (OUTPATIENT)
Dept: OTHER | Facility: HOSPITAL | Age: 70
End: 2025-02-24

## 2025-02-28 ENCOUNTER — OFFICE VISIT (OUTPATIENT)
Age: 70
End: 2025-02-28
Payer: MEDICARE

## 2025-02-28 VITALS
DIASTOLIC BLOOD PRESSURE: 72 MMHG | OXYGEN SATURATION: 99 % | RESPIRATION RATE: 18 BRPM | WEIGHT: 117.8 LBS | HEIGHT: 62 IN | SYSTOLIC BLOOD PRESSURE: 128 MMHG | BODY MASS INDEX: 21.68 KG/M2 | HEART RATE: 77 BPM | TEMPERATURE: 98.3 F

## 2025-02-28 DIAGNOSIS — D13.5 AMPULLARY ADENOMA: ICD-10-CM

## 2025-02-28 DIAGNOSIS — E78.00 PURE HYPERCHOLESTEROLEMIA: Primary | ICD-10-CM

## 2025-02-28 DIAGNOSIS — N18.2 CKD (CHRONIC KIDNEY DISEASE) STAGE 2, GFR 60-89 ML/MIN: ICD-10-CM

## 2025-02-28 DIAGNOSIS — Z00.00 MEDICARE ANNUAL WELLNESS VISIT, SUBSEQUENT: ICD-10-CM

## 2025-02-28 PROCEDURE — 99214 OFFICE O/P EST MOD 30 MIN: CPT | Performed by: FAMILY MEDICINE

## 2025-02-28 PROCEDURE — G2211 COMPLEX E/M VISIT ADD ON: HCPCS | Performed by: FAMILY MEDICINE

## 2025-02-28 PROCEDURE — G0439 PPPS, SUBSEQ VISIT: HCPCS | Performed by: FAMILY MEDICINE

## 2025-02-28 NOTE — PROGRESS NOTES
Name: Alicia Rai      : 1955      MRN: [id number]  Encounter Provider: Russel Meyer MD  Encounter Date: 2025   Encounter department: [facility] [place]    Assessment & Plan  Pure hypercholesterolemia  2025 Reviewed & discussed labs today.   Currently not prescribed any meds.   Reports no concerns.   Assessment: would recommend improvement with nutritional intake.  Med changes: None. Continue to focus on lifestyle improvement only.   None. Patient declines starting recommended meds at this time.    Advised improving nutritional intake and exercising regularly, really focusing on building good habits as discussed.  Goal of <60 Apolipoprotein B & LDL, <100 triglycerides, and >60 HDL  Monitor lipid panel in 6 months        CKD (chronic kidney disease) stage 2, GFR 60-89 ml/min  Lab Results   Component Value Date    EGFR 69 2025    EGFR 65 2024    EGFR 67 2023    CREATININE 0.85 2025    CREATININE 0.89 2024    CREATININE 0.88 2023     Labs reviewed & discussed   Assessment: stable in the high 60s.   Improve lifestyle and dietary changes to slow its progression as discussed  Low-sodium, low-carb diet, limit snacking, exercise regularly.  Avoid nephrotoxic agents including over-the-counter NSAIDs as discussed  Hydrate well with water.       Ampullary adenoma  Following Dr. Patel and GI specialist.   Planning for MRI coming up   Pt hesitant with surgery.   No alcohol since , previously drinking 2-3 beers on daily basis.       Medicare annual wellness visit, subsequent  Today we discussed patient's overall health including the following:  Previous blood work results, necessary blood work recommended to be done  BMI, nutritional intake, exercise, lifestyle changes  Screenings  Supplements  Medical conditions as listed          Depression Screening and Follow-up Plan: Patient was screened for depression during today's encounter. They  screened negative with a PHQ-2 score of 0.        Preventive health issues were discussed with patient, and age appropriate screening tests were ordered as noted in patient's After Visit Summary. Personalized health advice and appropriate referrals for health education or preventive services given if needed, as noted in patient's After Visit Summary.    History of Present Illness     HPI   Patient Care Team:  Russel Meyer MD as PCP - General (Family Medicine)  Obde Laws MD as PCP - [facility]CP-[facility] ([facility])  Obed Laws [facility]  MD Ab Kohli DO (Gastroenterology)  Tj Seo MD as Surgeon (Surgical Oncology)  Russel Meyer MD (Family Medicine)  Ten Rodriguez MD (Gastroenterology)  Juan Patel MD (Surgical Oncology)    Review of Systems  Medical History Reviewed by provider this encounter:       Annual Wellness Visit Questionnaire   Alicia is here for her Subsequent Wellness visit.     Health Risk Assessment:   Patient rates overall health as good. Patient feels that their physical health rating is same. Patient is satisfied with their life. Eyesight was rated as same. Hearing was rated as slightly worse. Patient feels that their emotional and mental health rating is same. Patients states they are never, rarely angry. Patient states they are sometimes unusually tired/fatigued. Pain experienced in the last 7 days has been none. Patient states that she has experienced no weight loss or gain in last 6 months.     Depression Screening:   PHQ-2 Score: 0      Fall Risk Screening:   In the past year, patient has experienced: no history of falling in past year      Urinary Incontinence Screening:   Patient has not leaked urine accidently in the last six months.     Home Safety:  Patient does not have trouble with stairs inside or outside of their home. Patient has working smoke alarms and has no working carbon monoxide detector. Home  safety hazards include: none.     Nutrition:   Current diet is Regular.     Medications:   Patient is currently taking over-the-counter supplements. OTC medications include: see medication list. Patient is able to manage medications.     Activities of Daily Living (ADLs)/Instrumental Activities of Daily Living (IADLs):   Walk and transfer into and out of bed and chair?: Yes  Dress and groom yourself?: Yes    Bathe or shower yourself?: Yes    Feed yourself? Yes  Do your laundry/housekeeping?: Yes  Manage your money, pay your bills and track your expenses?: Yes  Make your own meals?: Yes    Do your own shopping?: Yes    Advance Care Planning:   Living will: Yes    Advanced directive: Yes      PREVENTIVE SCREENINGS      Cardiovascular Screening:    General: Screening Not Indicated and History Lipid Disorder      Diabetes Screening:     General: Screening Current      Colorectal Cancer Screening:     General: Screening Current      Breast Cancer Screening:     General: Screening Current      Cervical Cancer Screening:    General: Screening Not Indicated      Osteoporosis Screening:    General: Screening Not Indicated and History Osteoporosis      Abdominal Aortic Aneurysm (AAA) Screening:        General: Screening Not Indicated      Lung Cancer Screening:     General: Screening Not Indicated      Hepatitis C Screening:    General: Screening Current    Screening, Brief Intervention, and Referral to Treatment (SBIRT)     Screening      Single Item Drug Screening:  How often have you used an illegal drug (including marijuana) or a prescription medication for non-medical reasons in the past year? never    Single Item Drug Screen Score: 0  Interpretation: Negative screen for possible drug use disorder    Other Counseling Topics:   Sunscreen and calcium and vitamin D intake and regular weightbearing exercise.     Social Drivers of Health     Financial Resource Strain: Low Risk  (2/16/2024)    Overall Financial Resource Strain  "(CARDIA)    • Difficulty of Paying Living Expenses: Not hard at all   Food Insecurity: No Food Insecurity (2/28/2025)    Hunger Vital Sign    • Worried About Running Out of Food in the Last Year: Never true    • Ran Out of Food in the Last Year: Never true   Transportation Needs: No Transportation Needs (2/28/2025)    PRAPARE - Transportation    • Lack of Transportation (Medical): No    • Lack of Transportation (Non-Medical): No   Housing Stability: Unknown (2/28/2025)    Housing Stability Vital Sign    • Unable to Pay for Housing in the Last Year: No    • Homeless in the Last Year: No   Utilities: Not At Risk (2/28/2025)    [facility] Utilities    • Threatened with loss of utilities: No     No results found.    Objective   /72 (BP Location: Right arm, Patient Position: Sitting, Cuff Size: Standard)   Pulse 77   Temp 98.3 °F (36.8 °C) (Tympanic)   Resp 18   Ht 5' 2\" (1.575 m)   Wt 53.4 kg (117 lb 12.8 oz)   LMP  (LMP Unknown)   SpO2 99%   BMI 21.55 kg/m²     Physical Exam  Vitals reviewed.   Constitutional:       General: She is not in acute distress.     Appearance: Normal appearance. She is not ill-appearing, toxic-appearing or diaphoretic.   HENT:      Head: Normocephalic and atraumatic.      Right Ear: External ear normal.      Left Ear: External ear normal.      Nose: No congestion or rhinorrhea.   Eyes:      General: No scleral icterus.        Right eye: No discharge.         Left eye: No discharge.      Conjunctiva/sclera: Conjunctivae normal.   Cardiovascular:      Rate and Rhythm: Normal rate.   Pulmonary:      Effort: Pulmonary effort is normal. No respiratory distress.   Neurological:      General: No focal deficit present.      Mental Status: She is alert and oriented to person, place, and time.   Psychiatric:         Mood and Affect: Mood normal.         Behavior: Behavior normal.         Thought Content: Thought content normal.         "

## 2025-02-28 NOTE — PATIENT INSTRUCTIONS
Medicare Preventive Visit Patient Instructions  Thank you for completing your Welcome to Medicare Visit or Medicare Annual Wellness Visit today. Your next wellness visit will be due in one year (3/1/2026).  The screening/preventive services that you may require over the next 5-10 years are detailed below. Some tests may not apply to you based off risk factors and/or age. Screening tests ordered at today's visit but not completed yet may show as past due. Also, please note that scanned in results may not display below.  Preventive Screenings:  Service Recommendations Previous Testing/Comments   Colorectal Cancer Screening  * Colonoscopy    * Fecal Occult Blood Test (FOBT)/Fecal Immunochemical Test (FIT)  * Fecal DNA/Cologuard Test  * Flexible Sigmoidoscopy Age: 45-75 years old   Colonoscopy: every 10 years (may be performed more frequently if at higher risk)  OR  FOBT/FIT: every 1 year  OR  Cologuard: every 3 years  OR  Sigmoidoscopy: every 5 years  Screening may be recommended earlier than age 45 if at higher risk for colorectal cancer. Also, an individualized decision between you and your healthcare provider will decide whether screening between the ages of 76-85 would be appropriate. Colonoscopy: 07/15/2024  FOBT/FIT: Not on file  Cologuard: Not on file  Sigmoidoscopy: Not on file    Screening Current     Breast Cancer Screening Age: 40+ years old  Frequency: every 1-2 years  Not required if history of left and right mastectomy Mammogram: 12/02/2024    Screening Current   Cervical Cancer Screening Between the ages of 21-29, pap smear recommended once every 3 years.   Between the ages of 30-65, can perform pap smear with HPV co-testing every 5 years.   Recommendations may differ for women with a history of total hysterectomy, cervical cancer, or abnormal pap smears in past. Pap Smear: 11/01/2022    Screening Not Indicated   Hepatitis C Screening Once for adults born between 1945 and 1965  More frequently in  patients at high risk for Hepatitis C Hep C Antibody: 11/13/2020    Screening Current   Diabetes Screening 1-2 times per year if you're at risk for diabetes or have pre-diabetes Fasting glucose: 97 mg/dL (2/18/2025)  A1C: 5.6 % (5/22/2024)  Screening Current   Cholesterol Screening Once every 5 years if you don't have a lipid disorder. May order more often based on risk factors. Lipid panel: 02/18/2025    Screening Not Indicated  History Lipid Disorder     Other Preventive Screenings Covered by Medicare:  Abdominal Aortic Aneurysm (AAA) Screening: covered once if your at risk. You're considered to be at risk if you have a family history of AAA.  Lung Cancer Screening: covers low dose CT scan once per year if you meet all of the following conditions: (1) Age 55-77; (2) No signs or symptoms of lung cancer; (3) Current smoker or have quit smoking within the last 15 years; (4) You have a tobacco smoking history of at least 20 pack years (packs per day multiplied by number of years you smoked); (5) You get a written order from a healthcare provider.  Glaucoma Screening: covered annually if you're considered high risk: (1) You have diabetes OR (2) Family history of glaucoma OR (3)  aged 50 and older OR (4)  American aged 65 and older  Osteoporosis Screening: covered every 2 years if you meet one of the following conditions: (1) You're estrogen deficient and at risk for osteoporosis based off medical history and other findings; (2) Have a vertebral abnormality; (3) On glucocorticoid therapy for more than 3 months; (4) Have primary hyperparathyroidism; (5) On osteoporosis medications and need to assess response to drug therapy.   Last bone density test (DXA Scan): 10/22/2024.  HIV Screening: covered annually if you're between the age of 15-65. Also covered annually if you are younger than 15 and older than 65 with risk factors for HIV infection. For pregnant patients, it is covered up to 3 times per  pregnancy.    Immunizations:  Immunization Recommendations   Influenza Vaccine Annual influenza vaccination during flu season is recommended for all persons aged >= 6 months who do not have contraindications   Pneumococcal Vaccine   * Pneumococcal conjugate vaccine = PCV13 (Prevnar 13), PCV15 (Vaxneuvance), PCV20 (Prevnar 20)  * Pneumococcal polysaccharide vaccine = PPSV23 (Pneumovax) Adults 19-65 yo with certain risk factors or if 65+ yo  If never received any pneumonia vaccine: recommend Prevnar 20 (PCV20)  Give PCV20 if previously received 1 dose of PCV13 or PPSV23   Hepatitis B Vaccine 3 dose series if at intermediate or high risk (ex: diabetes, end stage renal disease, liver disease)   Respiratory syncytial virus (RSV) Vaccine - COVERED BY MEDICARE PART D  * RSVPreF3 (Arexvy) CDC recommends that adults 60 years of age and older may receive a single dose of RSV vaccine using shared clinical decision-making (SCDM)   Tetanus (Td) Vaccine - COST NOT COVERED BY MEDICARE PART B Following completion of primary series, a booster dose should be given every 10 years to maintain immunity against tetanus. Td may also be given as tetanus wound prophylaxis.   Tdap Vaccine - COST NOT COVERED BY MEDICARE PART B Recommended at least once for all adults. For pregnant patients, recommended with each pregnancy.   Shingles Vaccine (Shingrix) - COST NOT COVERED BY MEDICARE PART B  2 shot series recommended in those 19 years and older who have or will have weakened immune systems or those 50 years and older     Health Maintenance Due:      Topic Date Due   • Breast Cancer Screening: Mammogram  12/02/2025   • Colorectal Cancer Screening  07/14/2029   • Hepatitis C Screening  Completed     Immunizations Due:      Topic Date Due   • Pneumococcal Vaccine: 65+ Years (2 of 2 - PPSV23) 08/01/2020   • Influenza Vaccine (1) 09/01/2024   • COVID-19 Vaccine (1 - 2024-25 season) Never done     Advance Directives   What are advance directives?   Advance directives are legal documents that state your wishes and plans for medical care. These plans are made ahead of time in case you lose your ability to make decisions for yourself. Advance directives can apply to any medical decision, such as the treatments you want, and if you want to donate organs.   What are the types of advance directives?  There are many types of advance directives, and each state has rules about how to use them. You may choose a combination of any of the following:  Living will:  This is a written record of the treatment you want. You can also choose which treatments you do not want, which to limit, and which to stop at a certain time. This includes surgery, medicine, IV fluid, and tube feedings.   Durable power of  for healthcare (DPAHC):  This is a written record that states who you want to make healthcare choices for you when you are unable to make them for yourself. This person, called a proxy, is usually a family member or a friend. You may choose more than 1 proxy.  Do not resuscitate (DNR) order:  A DNR order is used in case your heart stops beating or you stop breathing. It is a request not to have certain forms of treatment, such as CPR. A DNR order may be included in other types of advance directives.  Medical directive:  This covers the care that you want if you are in a coma, near death, or unable to make decisions for yourself. You can list the treatments you want for each condition. Treatment may include pain medicine, surgery, blood transfusions, dialysis, IV or tube feedings, and a ventilator (breathing machine).  Values history:  This document has questions about your views, beliefs, and how you feel and think about life. This information can help others choose the care that you would choose.  Why are advance directives important?  An advance directive helps you control your care. Although spoken wishes may be used, it is better to have your wishes written down.  Spoken wishes can be misunderstood, or not followed. Treatments may be given even if you do not want them. An advance directive may make it easier for your family to make difficult choices about your care.       © Copyright [employer] 2018 Information is for End User's use only and may not be sold, redistributed or otherwise used for commercial purposes. All illustrations and images included in CareNotes® are the copyrighted property of [employer] Inc[employer] or [employer]

## 2025-02-28 NOTE — ASSESSMENT & PLAN NOTE
Lab Results   Component Value Date    EGFR 69 02/18/2025    EGFR 65 07/08/2024    EGFR 67 12/08/2023    CREATININE 0.85 02/18/2025    CREATININE 0.89 07/08/2024    CREATININE 0.88 12/08/2023     Labs reviewed & discussed   Assessment: stable in the high 60s.   Improve lifestyle and dietary changes to slow its progression as discussed  Low-sodium, low-carb diet, limit snacking, exercise regularly.  Avoid nephrotoxic agents including over-the-counter NSAIDs as discussed  Hydrate well with water.

## 2025-02-28 NOTE — ASSESSMENT & PLAN NOTE
02/18/2025 Reviewed & discussed labs today.   Currently not prescribed any meds.   Reports no concerns.   Assessment: would recommend improvement with nutritional intake.  Med changes: None. Continue to focus on lifestyle improvement only.   None. Patient declines starting recommended meds at this time.    Advised improving nutritional intake and exercising regularly, really focusing on building good habits as discussed.  Goal of <60 Apolipoprotein B & LDL, <100 triglycerides, and >60 HDL  Monitor lipid panel in 6 months

## 2025-02-28 NOTE — ASSESSMENT & PLAN NOTE
Following Dr. Patel and GI specialist.   Planning for MRI coming up   Pt hesitant with surgery.   No alcohol since January 1st, previously drinking 2-3 beers on daily basis.

## 2025-04-14 ENCOUNTER — TELEPHONE (OUTPATIENT)
Age: 70
End: 2025-04-14

## 2025-04-14 NOTE — TELEPHONE ENCOUNTER
Patient called in because she was looking through her my chart and she had noticed that there was an order in for blood work from Dr. Patel. The lab orders are her creat and BUN levels. Informed patient that these will need to be completed prior to her MRI w/wo contrast on 4/30 - pt verbalized understanding and will have her labs completed this week or next.

## 2025-04-15 ENCOUNTER — ANESTHESIA (OUTPATIENT)
Dept: GASTROENTEROLOGY | Facility: HOSPITAL | Age: 70
End: 2025-04-15
Payer: MEDICARE

## 2025-04-15 ENCOUNTER — HOSPITAL ENCOUNTER (OUTPATIENT)
Dept: RADIOLOGY | Facility: HOSPITAL | Age: 70
Discharge: HOME | End: 2025-04-15
Payer: MEDICARE

## 2025-04-15 ENCOUNTER — HOSPITAL ENCOUNTER (OUTPATIENT)
Dept: GASTROENTEROLOGY | Facility: HOSPITAL | Age: 70
Setting detail: OUTPATIENT SURGERY
Discharge: HOME | End: 2025-04-15
Attending: INTERNAL MEDICINE
Payer: MEDICARE

## 2025-04-15 ENCOUNTER — ANESTHESIA EVENT (OUTPATIENT)
Dept: GASTROENTEROLOGY | Facility: HOSPITAL | Age: 70
End: 2025-04-15
Payer: MEDICARE

## 2025-04-15 ENCOUNTER — ANESTHESIA EVENT (OUTPATIENT)
Dept: ANESTHESIOLOGY | Facility: HOSPITAL | Age: 70
End: 2025-04-15

## 2025-04-15 ENCOUNTER — ANESTHESIA (OUTPATIENT)
Dept: ANESTHESIOLOGY | Facility: HOSPITAL | Age: 70
End: 2025-04-15

## 2025-04-15 VITALS
SYSTOLIC BLOOD PRESSURE: 133 MMHG | OXYGEN SATURATION: 99 % | DIASTOLIC BLOOD PRESSURE: 78 MMHG | WEIGHT: 117 LBS | BODY MASS INDEX: 21.53 KG/M2 | RESPIRATION RATE: 18 BRPM | HEART RATE: 75 BPM | TEMPERATURE: 96.2 F | HEIGHT: 62 IN

## 2025-04-15 DIAGNOSIS — D13.5 AMPULLARY ADENOMA: ICD-10-CM

## 2025-04-15 DIAGNOSIS — K83.9 BILE DUCT ABNORMALITY: Primary | ICD-10-CM

## 2025-04-15 PROCEDURE — 74328 X-RAY BILE DUCT ENDOSCOPY: CPT

## 2025-04-15 PROCEDURE — C1769 GUIDE WIRE: HCPCS

## 2025-04-15 PROCEDURE — 43264 ERCP REMOVE DUCT CALCULI: CPT | Performed by: INTERNAL MEDICINE

## 2025-04-15 PROCEDURE — 43276 ERCP STENT EXCHANGE W/DILATE: CPT | Performed by: INTERNAL MEDICINE

## 2025-04-15 PROCEDURE — 88312 SPECIAL STAINS GROUP 1: CPT | Performed by: STUDENT IN AN ORGANIZED HEALTH CARE EDUCATION/TRAINING PROGRAM

## 2025-04-15 PROCEDURE — C1874 STENT, COATED/COV W/DEL SYS: HCPCS

## 2025-04-15 PROCEDURE — 43278 ERCP LESION ABLATE W/DILATE: CPT | Performed by: INTERNAL MEDICINE

## 2025-04-15 PROCEDURE — C2617 STENT, NON-COR, TEM W/O DEL: HCPCS

## 2025-04-15 PROCEDURE — 88305 TISSUE EXAM BY PATHOLOGIST: CPT | Performed by: STUDENT IN AN ORGANIZED HEALTH CARE EDUCATION/TRAINING PROGRAM

## 2025-04-15 PROCEDURE — 88342 IMHCHEM/IMCYTCHM 1ST ANTB: CPT | Performed by: STUDENT IN AN ORGANIZED HEALTH CARE EDUCATION/TRAINING PROGRAM

## 2025-04-15 RX ORDER — FENTANYL CITRATE 50 UG/ML
INJECTION, SOLUTION INTRAMUSCULAR; INTRAVENOUS AS NEEDED
Status: DISCONTINUED | OUTPATIENT
Start: 2025-04-15 | End: 2025-04-15

## 2025-04-15 RX ORDER — SODIUM CHLORIDE, SODIUM LACTATE, POTASSIUM CHLORIDE, CALCIUM CHLORIDE 600; 310; 30; 20 MG/100ML; MG/100ML; MG/100ML; MG/100ML
125 INJECTION, SOLUTION INTRAVENOUS CONTINUOUS
Status: DISCONTINUED | OUTPATIENT
Start: 2025-04-15 | End: 2025-04-19 | Stop reason: HOSPADM

## 2025-04-15 RX ORDER — CEFAZOLIN SODIUM 1 G/3ML
INJECTION, POWDER, FOR SOLUTION INTRAMUSCULAR; INTRAVENOUS AS NEEDED
Status: DISCONTINUED | OUTPATIENT
Start: 2025-04-15 | End: 2025-04-15

## 2025-04-15 RX ORDER — LIDOCAINE HYDROCHLORIDE 20 MG/ML
INJECTION, SOLUTION EPIDURAL; INFILTRATION; INTRACAUDAL; PERINEURAL AS NEEDED
Status: DISCONTINUED | OUTPATIENT
Start: 2025-04-15 | End: 2025-04-15

## 2025-04-15 RX ORDER — SUCCINYLCHOLINE/SOD CL,ISO/PF 100 MG/5ML
SYRINGE (ML) INTRAVENOUS AS NEEDED
Status: DISCONTINUED | OUTPATIENT
Start: 2025-04-15 | End: 2025-04-15

## 2025-04-15 RX ORDER — PROPOFOL 10 MG/ML
INJECTION, EMULSION INTRAVENOUS AS NEEDED
Status: DISCONTINUED | OUTPATIENT
Start: 2025-04-15 | End: 2025-04-15

## 2025-04-15 RX ORDER — PROPOFOL 10 MG/ML
INJECTION, EMULSION INTRAVENOUS CONTINUOUS PRN
Status: DISCONTINUED | OUTPATIENT
Start: 2025-04-15 | End: 2025-04-15

## 2025-04-15 RX ORDER — DEXAMETHASONE SODIUM PHOSPHATE 10 MG/ML
INJECTION, SOLUTION INTRAMUSCULAR; INTRAVENOUS AS NEEDED
Status: DISCONTINUED | OUTPATIENT
Start: 2025-04-15 | End: 2025-04-15

## 2025-04-15 RX ORDER — ONDANSETRON 2 MG/ML
INJECTION INTRAMUSCULAR; INTRAVENOUS AS NEEDED
Status: DISCONTINUED | OUTPATIENT
Start: 2025-04-15 | End: 2025-04-15

## 2025-04-15 RX ORDER — NEOSTIGMINE METHYLSULFATE 1 MG/ML
INJECTION INTRAVENOUS AS NEEDED
Status: DISCONTINUED | OUTPATIENT
Start: 2025-04-15 | End: 2025-04-15

## 2025-04-15 RX ORDER — ROCURONIUM BROMIDE 10 MG/ML
INJECTION, SOLUTION INTRAVENOUS AS NEEDED
Status: DISCONTINUED | OUTPATIENT
Start: 2025-04-15 | End: 2025-04-15

## 2025-04-15 RX ORDER — SODIUM CHLORIDE, SODIUM LACTATE, POTASSIUM CHLORIDE, CALCIUM CHLORIDE 600; 310; 30; 20 MG/100ML; MG/100ML; MG/100ML; MG/100ML
125 INJECTION, SOLUTION INTRAVENOUS CONTINUOUS
Status: CANCELLED | OUTPATIENT
Start: 2025-04-15

## 2025-04-15 RX ORDER — SODIUM CHLORIDE, SODIUM LACTATE, POTASSIUM CHLORIDE, CALCIUM CHLORIDE 600; 310; 30; 20 MG/100ML; MG/100ML; MG/100ML; MG/100ML
INJECTION, SOLUTION INTRAVENOUS CONTINUOUS PRN
Status: DISCONTINUED | OUTPATIENT
Start: 2025-04-15 | End: 2025-04-15

## 2025-04-15 RX ORDER — GLYCOPYRROLATE 0.2 MG/ML
INJECTION INTRAMUSCULAR; INTRAVENOUS AS NEEDED
Status: DISCONTINUED | OUTPATIENT
Start: 2025-04-15 | End: 2025-04-15

## 2025-04-15 RX ADMIN — PROPOFOL 50 MCG/KG/MIN: 10 INJECTION, EMULSION INTRAVENOUS at 10:41

## 2025-04-15 RX ADMIN — Medication 100 MG: at 10:37

## 2025-04-15 RX ADMIN — FENTANYL CITRATE 25 MCG: 50 INJECTION INTRAMUSCULAR; INTRAVENOUS at 10:57

## 2025-04-15 RX ADMIN — GLYCOPYRROLATE 0.4 MG: 0.2 INJECTION, SOLUTION INTRAMUSCULAR; INTRAVENOUS at 11:21

## 2025-04-15 RX ADMIN — ONDANSETRON 4 MG: 2 INJECTION INTRAMUSCULAR; INTRAVENOUS at 10:37

## 2025-04-15 RX ADMIN — NEOSTIGMINE METHYLSULFATE 2 MG: 1 INJECTION INTRAVENOUS at 11:21

## 2025-04-15 RX ADMIN — SODIUM CHLORIDE, SODIUM LACTATE, POTASSIUM CHLORIDE, AND CALCIUM CHLORIDE: .6; .31; .03; .02 INJECTION, SOLUTION INTRAVENOUS at 10:24

## 2025-04-15 RX ADMIN — ROCURONIUM BROMIDE 20 MG: 10 INJECTION, SOLUTION INTRAVENOUS at 10:45

## 2025-04-15 RX ADMIN — LIDOCAINE HYDROCHLORIDE 60 MG: 20 INJECTION, SOLUTION EPIDURAL; INFILTRATION; INTRACAUDAL; PERINEURAL at 10:37

## 2025-04-15 RX ADMIN — FENTANYL CITRATE 25 MCG: 50 INJECTION INTRAMUSCULAR; INTRAVENOUS at 11:20

## 2025-04-15 RX ADMIN — FENTANYL CITRATE 50 MCG: 50 INJECTION INTRAMUSCULAR; INTRAVENOUS at 10:37

## 2025-04-15 RX ADMIN — DEXAMETHASONE SODIUM PHOSPHATE 10 MG: 10 INJECTION, SOLUTION INTRAMUSCULAR; INTRAVENOUS at 10:37

## 2025-04-15 RX ADMIN — CEFAZOLIN 1000 MG: 1 INJECTION, POWDER, FOR SOLUTION INTRAMUSCULAR; INTRAVENOUS at 10:57

## 2025-04-15 RX ADMIN — IOHEXOL 2 ML: 240 INJECTION, SOLUTION INTRATHECAL; INTRAVASCULAR; INTRAVENOUS; ORAL at 11:42

## 2025-04-15 RX ADMIN — PROPOFOL 200 MG: 10 INJECTION, EMULSION INTRAVENOUS at 10:37

## 2025-04-15 RX ADMIN — PHENYLEPHRINE HYDROCHLORIDE 50 MCG/MIN: 10 INJECTION INTRAVENOUS at 10:41

## 2025-04-15 NOTE — ANESTHESIA PREPROCEDURE EVALUATION
"Procedure:  PRE-OP ONLY    Relevant Problems   CARDIO   (+) Pure hypercholesterolemia      GI/HEPATIC   (+) Ampullary adenoma      /RENAL   (+) CKD (chronic kidney disease) stage 2, GFR 60-89 ml/min      MUSCULOSKELETAL   (+) Primary osteoarthritis      Ampullary adenoma s/p previous ERCPs and stenting    EKG 12/2023:  Normal sinus rhythm  Normal ECG  No previous ECGs available    Lab Results   Component Value Date    WBC 6.46 02/18/2025    HGB 13.7 02/18/2025    HCT 42.4 02/18/2025    MCV 90 02/18/2025     02/18/2025     Lab Results   Component Value Date    SODIUM 139 02/18/2025    K 4.2 02/18/2025     02/18/2025    CO2 29 02/18/2025    BUN 16 02/18/2025    CREATININE 0.85 02/18/2025    GLUC 95 07/08/2024    CALCIUM 9.9 02/18/2025     No results found for: \"INR\", \"PROTIME\"  Lab Results   Component Value Date    HGBA1C 5.6 05/22/2024               Anesthesia Plan  ASA Score- 2     Anesthesia Type- general with ASA Monitors.         Additional Monitors:     Airway Plan: ETT.           Plan Factors-    Chart reviewed. EKG reviewed.  Existing labs reviewed. Patient summary reviewed.                  Induction- intravenous.    Postoperative Plan- . Planned trial extubation        Informed Consent-       NPO Status:  No vitals data found for the desired time range.        "

## 2025-04-15 NOTE — ANESTHESIA POSTPROCEDURE EVALUATION
Post-Op Assessment Note    CV Status:  Stable    Pain management: adequate       Mental Status:  Sleepy and lethargic   Hydration Status:  Stable   PONV Controlled:  None   Airway Patency:  Patent     Post Op Vitals Reviewed: Yes    No anethesia notable event occurred.    Staff: CRNA         Last Filed PACU Vitals:  Vitals Value Taken Time   Temp 96.2 °F (35.7 °C) 04/15/25 1134   Pulse 90 04/15/25 1134   /83 04/15/25 1134   Resp 17 04/15/25 1134   SpO2 97 % 04/15/25 1134       Modified Geovani:     Vitals Value Taken Time   Activity 2 04/15/25 1135   Respiration 2 04/15/25 1135   Circulation 2 04/15/25 1135   Consciousness 1 04/15/25 1135   Oxygen Saturation 2 04/15/25 1135     Modified Geovani Score: 9

## 2025-04-15 NOTE — H&P
History and Physical - [facility]  Alicia Rai 70 y.o. female MRN: [id number]                  HPI: Alicia Rai is a 70 y.o. year old female who presents for ERCP      REVIEW OF SYSTEMS: Per the HPI, and otherwise unremarkable.    Historical Information   Past Medical History:   Diagnosis Date    Colon polyp     Medical history reviewed with no changes     Osteoporosis     Thyroglossal cyst      Past Surgical History:   Procedure Laterality Date    BREAST BIOPSY Left 2018    benign    CATARACT EXTRACTION      COLONOSCOPY      2017    CYSTOSCOPY  2021    Dr. Jeannette Hadley     DENTAL IMPLANT      EGD  2022    ERCP  2021    Bile duct abnormality    AL MASTECTOMY PARTIAL Right 2024    Procedure: RIGHT BREAST SAIGE  DIRECTED LUMPECTOMY;  Surgeon: Tj Seo MD;  Location: [facility];  Service: Surgical Oncology    THROAT SURGERY      UPPER GASTROINTESTINAL ENDOSCOPY  2021    Bile duct abnormality    US BREAST CLIP NEEDLE LOC RIGHT Right 2024    US BREAST CYST ASPIRATION LEFT INITIAL Left 2017    US GUIDED BREAST BIOPSY RIGHT COMPLETE Right 2023     Social History   Social History     Substance and Sexual Activity   Alcohol Use Not Currently    Alcohol/week: 14.0 standard drinks of alcohol    Types: 14 Standard drinks or equivalent per week    Comment: quit      Social History     Substance and Sexual Activity   Drug Use Never     Social History     Tobacco Use   Smoking Status Former    Current packs/day: 0.00    Average packs/day: 0.3 packs/day for 33.2 years (8.3 ttl pk-yrs)    Types: Cigarettes    Start date: 3/17/1971    Quit date: 3/17/2004    Years since quittin.0    Passive exposure: Past   Smokeless Tobacco Never   Tobacco Comments    quit      Family History   Problem Relation Age of Onset    Dementia Mother     Diabetes Father     No Known Problems Maternal Aunt     No Known Problems Maternal Aunt   "   No Known Problems Maternal Aunt     Leukemia Maternal Grandmother     Diabetes Paternal Grandmother     Breast cancer Cousin 45    Breast cancer Cousin 45       Meds/Allergies       Current Outpatient Medications:     calcium carbonate (OS-CARTER) 600 MG tablet    Cholecalciferol (VITAMIN D3) 1000 units CAPS    COLLAGEN PO    folic acid (FOLVITE) 1 mg tablet    VITAMIN K PO    Current Facility-Administered Medications:     lactated ringers infusion, 125 mL/hr, Intravenous, Continuous    lactated ringers infusion, 125 mL/hr, Intravenous, Continuous    Facility-Administered Medications Ordered in Other Encounters:     lactated ringers infusion, , Intravenous, Continuous PRN, New Bag at 04/15/25 1024    No Known Allergies    Objective     /82   Pulse 90   Temp (!) 96.4 °F (35.8 °C) (Tympanic)   Resp 18   Ht 5' 2\" (1.575 m)   Wt 53.1 kg (117 lb)   LMP  (LMP Unknown)   SpO2 99%   BMI 21.40 kg/m²       PHYSICAL EXAM    Gen: NAD  Head: NCAT  CV: RRR  CHEST: Clear  ABD: soft, NT/ND  EXT: no edema      ASSESSMENT/PLAN:  This is a 70 y.o. year old female here for ERCP, and she is stable and optimized for her procedure.        "

## 2025-04-15 NOTE — ANESTHESIA PREPROCEDURE EVALUATION
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    EKG 12/2023:  Normal sinus rhythm  Normal ECG  No previous ECGs available    Lab Results   Component Value Date    WBC 6.46 02/18/2025    HGB 13.7 02/18/2025    HCT 42.4 02/18/2025    MCV 90 02/18/2025     02/18/2025     Lab Results   Component Value Date    SODIUM 139 02/18/2025    K 4.2 02/18/2025     02/18/2025    CO2 29 02/18/2025    BUN 16 02/18/2025    CREATININE 0.85 02/18/2025    GLUC 95 07/08/2024    CALCIUM 9.9 02/18/2025     No results found for: \"INR\", \"PROTIME\"  Lab Results   Component Value Date    HGBA1C 5.6 05/22/2024          Physical Exam    Airway    Mallampati score: II  TM Distance: >3 FB  Neck ROM: full     Dental   No notable dental hx     Cardiovascular  Cardiovascular exam normal    Pulmonary  Pulmonary exam normal     Other Findings  post-pubertal.      Anesthesia Plan  ASA Score- 2     Anesthesia Type- general with ASA Monitors.         Additional Monitors:     Airway Plan: ETT.    Comment: Discussed with patient plan for anesthesia, as well as risks/benefits, including likely chance of PONV and sore throat, as well as rare possibilities of dental/oropharyngeal/ocular injuries, aspiration, and severe/life-threatening surgical and anesthetic emergencies.  Patient expressed understanding and agreement.  .       Plan Factors-Exercise tolerance (METS): >4 METS.    Chart reviewed. EKG reviewed.  Existing labs reviewed. Patient summary reviewed.      "             Induction- intravenous.    Postoperative Plan- . Planned trial extubation        Informed Consent- Anesthetic plan and risks discussed with patient.  I personally reviewed this patient with the CRNA. Discussed and agreed on the Anesthesia Plan with the CRNA..      NPO Status:  No vitals data found for the desired time range.

## 2025-04-17 ENCOUNTER — APPOINTMENT (OUTPATIENT)
Age: 70
End: 2025-04-17
Attending: SURGERY
Payer: MEDICARE

## 2025-04-17 DIAGNOSIS — D13.5 AMPULLARY ADENOMA: ICD-10-CM

## 2025-04-17 PROCEDURE — 88312 SPECIAL STAINS GROUP 1: CPT | Performed by: STUDENT IN AN ORGANIZED HEALTH CARE EDUCATION/TRAINING PROGRAM

## 2025-04-17 PROCEDURE — 88342 IMHCHEM/IMCYTCHM 1ST ANTB: CPT | Performed by: STUDENT IN AN ORGANIZED HEALTH CARE EDUCATION/TRAINING PROGRAM

## 2025-04-17 PROCEDURE — 84520 ASSAY OF UREA NITROGEN: CPT

## 2025-04-17 PROCEDURE — 82565 ASSAY OF CREATININE: CPT

## 2025-04-17 PROCEDURE — 88305 TISSUE EXAM BY PATHOLOGIST: CPT | Performed by: STUDENT IN AN ORGANIZED HEALTH CARE EDUCATION/TRAINING PROGRAM

## 2025-04-17 PROCEDURE — 36415 COLL VENOUS BLD VENIPUNCTURE: CPT

## 2025-04-18 ENCOUNTER — RESULTS FOLLOW-UP (OUTPATIENT)
Dept: GASTROENTEROLOGY | Facility: CLINIC | Age: 70
End: 2025-04-18

## 2025-04-18 LAB
BUN SERPL-MCNC: 21 MG/DL (ref 5–25)
CREAT SERPL-MCNC: 0.86 MG/DL (ref 0.6–1.3)
GFR SERPL CREATININE-BSD FRML MDRD: 68 ML/MIN/1.73SQ M

## 2025-04-22 NOTE — TELEPHONE ENCOUNTER
Procedure: ERCP  Scheduled date of procedure (as of today): 7/15/25  Physician performing procedure: Dr. Rodriguez   Location of procedure: [place]   Instructions reviewed with patient by:  Ne ramos   Clearances:  n/a

## 2025-04-30 ENCOUNTER — HOSPITAL ENCOUNTER (OUTPATIENT)
Dept: MRI IMAGING | Facility: HOSPITAL | Age: 70
Discharge: HOME | End: 2025-04-30
Attending: SURGERY
Payer: MEDICARE

## 2025-04-30 DIAGNOSIS — D13.5 AMPULLARY ADENOMA: ICD-10-CM

## 2025-04-30 PROCEDURE — 74183 MRI ABD W/O CNTR FLWD CNTR: CPT

## 2025-04-30 PROCEDURE — A9585 GADOBUTROL INJECTION: HCPCS | Performed by: SURGERY

## 2025-04-30 RX ORDER — GADOBUTROL 604.72 MG/ML
5 INJECTION INTRAVENOUS
Status: COMPLETED | OUTPATIENT
Start: 2025-04-30 | End: 2025-04-30

## 2025-04-30 RX ADMIN — GADOBUTROL 5 ML: 604.72 INJECTION INTRAVENOUS at 09:14

## 2025-05-01 ENCOUNTER — OFFICE VISIT (OUTPATIENT)
Dept: SURGICAL ONCOLOGY | Facility: CLINIC | Age: 70
End: 2025-05-01
Payer: MEDICARE

## 2025-05-01 VITALS
WEIGHT: 117 LBS | OXYGEN SATURATION: 98 % | TEMPERATURE: 97.5 F | HEIGHT: 62 IN | SYSTOLIC BLOOD PRESSURE: 118 MMHG | DIASTOLIC BLOOD PRESSURE: 70 MMHG | HEART RATE: 79 BPM | BODY MASS INDEX: 21.53 KG/M2

## 2025-05-01 DIAGNOSIS — D13.5 AMPULLARY ADENOMA: Primary | ICD-10-CM

## 2025-05-01 PROCEDURE — 99214 OFFICE O/P EST MOD 30 MIN: CPT | Performed by: SURGERY

## 2025-05-01 NOTE — LETTER
May 1, 2025     Russel Meyer MD  [address] [place] [id number]    Patient: Alicia Rai   YOB: 1955   Date of Visit: 5/1/2025       Dear MD Ten Nelson MD:    Thank you for referring Alicia Rai to me for evaluation. Below are my notes for this consultation.    If you have questions, please do not hesitate to call me. I look forward to following your patient along with you.         Sincerely,        Juan Patel MD        CC: MD Juan Burgos MD  5/1/2025 11:23 AM  Incomplete               Surgical Oncology Follow Up       [facility] [place]  [address] [facility] [address]  [place] [address]  [phone]    Alicia Rai  1955  [id number]  [facility] [place]  [address] [facility] [address]  [facility] [address]  [phone]    1. Ampullary adenoma         Chief Complaint   Patient presents with   • Follow-up     3 month f/u       No follow-ups on file.      Oncology History    No history exists.       History of Present Illness:  69-year-old female with a longstanding history of an ampullary adenoma.  These have been resected and ablated for several years.  She was found to have focal high-grade dysplasia in August 2021 and then has had serial procedures.  Most recently she had an ERCP on December 17 for this ampullary adenoma.  She had a bile duct and pancreatic stent placed.  There was sludge and debris in the bile duct.  The distal bile duct was sampled and ablation was performed in the major papilla in the distal common bile duct.  Pathology revealed fragments of adenoma with high-grade dysplasia.  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  She comes in now to discuss further therapy.  Her appetite is good.  No unintentional weight  loss.  No personal history of pancreatitis.  No family history of pancreas cancer.  Her father did have some type of malignancy but  from diabetes.  She has 2 cousins with breast cancer.  She does drink 2 beers a day for many years as well as an occasional glass of wine.  Over the weekend she may drink 3-4 beers a day.  She has no history of cirrhosis.  She has not had any cross-sectional imaging.      Internal history: Follow-up MRI from 2025 reveals artifact from the bile duct stent.  There is pneumobilia.  There are no filling defects or masses.  I personally reviewed the films.  She continues to feel well.  Her ERCP from April 15, 2025 revealed the covered stent in the common duct as well as a plastic stent in the pancreatic duct.  Sludge and debris was removed from the proximal bile duct.  Biopsies of the distal bile duct were performed.  Ablation of the major papilla and common bile duct was performed with radiofrequency ablation.    Interval history, 2025: MRI from 2025 was stable.  There were no suspicious masses.  ERCP from April 15, 2025 revealed pancreatic and bile duct stents.  Sludge and stones were removed.  Biopsies were performed.  Ablation of the major papilla and distal common bile duct were performed.  Pathology was benign with low-grade dysplasia.  The ampulla had no obvious malignancy.  I have personally reviewed her films.    Review of Systems  Complete ROS Surg Onc:   Complete ROS Surg Onc:   Constitutional: The patient denies new or recent history of general fatigue, no recent weight loss, no change in appetite.   Eyes: No complaints of visual problems, no scleral icterus.   ENT: no complaints of ear pain, no hoarseness, no difficulty swallowing,  no tinnitus and no new masses in head, oral cavity, or neck.   Cardiovascular: No complaints of chest pain, no palpitations, no ankle edema.   Respiratory: No complaints of shortness of breath, no cough.    Gastrointestinal: No complaints of jaundice, no bloody stools, no pale stools.   Genitourinary: No complaints of dysuria, no hematuria, no nocturia, no frequent urination, no urethral discharge.   Musculoskeletal: No complaints of weakness, paralysis, joint stiffness or arthralgias.  Integumentary: No complaints of rash, no new lesions.   Neurological: No complaints of convulsions, no seizures, no dizziness.   Hematologic/Lymphatic: No complaints of easy bruising.   Endocrine:  No hot or cold intolerance.  No polydipsia, polyphagia, or polyuria.  Allergy/immunology:  No environmental allergies.  No food allergies.  Not immunocompromised.  Skin:  No pallor or rash.  No wound.        Patient Active Problem List   Diagnosis   • Bile duct abnormality   • Tubular adenoma of colon   • Seborrheic keratosis   • Pure hypercholesterolemia   • Osteoporosis without current pathological fracture   • Primary osteoarthritis   • Colon polyp   • Asymptomatic postmenopausal status   • Atrophic vaginitis   • Pseudoangiomatous stromal hyperplasia of breast   • Intraductal papilloma of breast, right   • CKD (chronic kidney disease) stage 2, GFR 60-89 ml/min   • Ampullary adenoma     Past Medical History:   Diagnosis Date   • Colon polyp    • Medical history reviewed with no changes    • Osteoporosis    • Thyroglossal cyst      Past Surgical History:   Procedure Laterality Date   • BREAST BIOPSY Left 2018    benign   • CATARACT EXTRACTION     • COLONOSCOPY      2017   • CYSTOSCOPY  07/06/2021    Dr. Jeannette Hadley    • DENTAL IMPLANT     • EGD  09/27/2022   • ERCP  03/16/2021    Bile duct abnormality   • IL MASTECTOMY PARTIAL Right 01/11/2024    Procedure: RIGHT BREAST SAIGE  DIRECTED LUMPECTOMY;  Surgeon: Tj Seo MD;  Location: [facility];  Service: Surgical Oncology   • THROAT SURGERY     • UPPER GASTROINTESTINAL ENDOSCOPY  03/16/2021    Bile duct abnormality   • US BREAST CLIP NEEDLE LOC RIGHT Right 01/04/2024    • US BREAST CYST ASPIRATION LEFT INITIAL Left 2017   • US GUIDED BREAST BIOPSY RIGHT COMPLETE Right 2023     Family History   Problem Relation Age of Onset   • Dementia Mother    • Diabetes Father    • No Known Problems Maternal Aunt    • No Known Problems Maternal Aunt    • No Known Problems Maternal Aunt    • Leukemia Maternal Grandmother    • Diabetes Paternal Grandmother    • Breast cancer Cousin 45   • Breast cancer Cousin 45     Social History     Socioeconomic History   • Marital status:      Spouse name: Not on file   • Number of children: Not on file   • Years of education: Not on file   • Highest education level: Not on file   Occupational History   • Not on file   Tobacco Use   • Smoking status: Former     Current packs/day: 0.00     Average packs/day: 0.3 packs/day for 33.2 years (8.3 ttl pk-yrs)     Types: Cigarettes     Start date: 3/17/1971     Quit date: 3/17/2004     Years since quittin.1     Passive exposure: Past   • Smokeless tobacco: Never   • Tobacco comments:     quit    Vaping Use   • Vaping status: Never Used   Substance and Sexual Activity   • Alcohol use: Not Currently     Alcohol/week: 14.0 standard drinks of alcohol     Types: 14 Standard drinks or equivalent per week     Comment: quit    • Drug use: Never   • Sexual activity: Not Currently     Partners: Male     Birth control/protection: Post-menopausal   Other Topics Concern   • Not on file   Social History Narrative   • Not on file     Social Drivers of Health     Financial Resource Strain: Low Risk  (2024)    Overall Financial Resource Strain (CARDIA)    • Difficulty of Paying Living Expenses: Not hard at all   Food Insecurity: No Food Insecurity (2025)    Hunger Vital Sign    • Worried About Running Out of Food in the Last Year: Never true    • Ran Out of Food in the Last Year: Never true   Transportation Needs: No Transportation Needs (2025)    PRAPARE - Transportation    •  Lack of Transportation (Medical): No    • Lack of Transportation (Non-Medical): No   Physical Activity: Sufficiently Active (2/28/2025)    Exercise Vital Sign    • Days of Exercise per Week: 7 days    • Minutes of Exercise per Session: 60 min   Stress: No Stress Concern Present (12/23/2022)    [employer] - Occupational Stress Questionnaire    • Feeling of Stress : Not at all   Social Connections: Not on file   Intimate Partner Violence: Not on file   Housing Stability: Unknown (2/28/2025)    Housing Stability Vital Sign    • Unable to Pay for Housing in the Last Year: No    • Number of Times Moved in the Last Year: Not on file    • Homeless in the Last Year: No       Current Outpatient Medications:   •  calcium carbonate (OS-CARTER) 600 MG tablet, Take 600 mg by mouth daily, Disp: , Rfl:   •  Cholecalciferol (VITAMIN D3) 1000 units CAPS, Take by mouth in the morning, Disp: , Rfl:   •  COLLAGEN PO, Take 5,000 mcg by mouth daily, Disp: , Rfl:   •  folic acid (FOLVITE) 1 mg tablet, Take by mouth daily, Disp: , Rfl:   •  VITAMIN K PO, Take by mouth, Disp: , Rfl:   No Known Allergies  Vitals:    05/01/25 1103   BP: 118/70   Pulse: 79   Temp: 97.5 °F (36.4 °C)   SpO2: 98%       Physical Exam  Constitutional: General appearance: The Patient is well-developed and well-nourished who appears the stated age in no acute distress. Patient is pleasant and talkative.     HEENT:  Normocephalic.  Sclerae are anicteric. Mucous membranes are moist. Neck is supple without adenopathy. No JVD.     Chest: The lungs are clear to auscultation.     Cardiac: Heart is regular rate.     Abdomen: Abdomen is soft, non-tender, non-distended and without masses.     Extremities: There is no clubbing or cyanosis. There is no edema.  Symmetric.  Neuro: Grossly nonfocal. Gait is normal.     Lymphatic: No evidence of cervical adenopathy bilaterally.   No evidence of axillary adenopathy bilaterally. No evidence of inguinal  adenopathy bilaterally.     Skin: Warm, anicteric.    Psych:  Patient is pleasant and talkative.  Breasts:        Pathology:  Final Diagnosis   A. DUODENUM, AMPULLA OF ESTRELLITA SITE OF AMPULLECTOMY, BIOPSIES:    - Reactive duodenal mucosa with associated acute inflammatory exudate and fungal organisms; see note.     COMMON BILE DUCT, PAPILLARY LESION, HOT SNARE:    - Low-grade dysplasia; see note.    - Additional levels examined.       Reviewed with agreement in intradepartmental consensus.    Electronically signed by Zach Rubio MD on 4/17/2025 at 1701 EDT   Note    Part A:   There is fibrinous and necrotic acute inflammatory tissue involved by abundant fungal forms (detected by PAS and GMS). A pan-cytokeratin CK-AE1/3 stain highlights epithelial cells. There is reactive atypia noted, which is favored to be due to the fungal infection, prior biopsy and ablation. Repeat biopsies following eradication of fungal infection is recommended for more complete evaluation of dysplasia, however, as clinically indicated. The morphology may be compatible with Candida species, however correlation with microbiologic cultures is recommended for definitive identification and susceptibilities, as clinically indicated. Tissue is available to send for Universal PCR to identify the fungal species if cultures are not able to be obtained, and as clinically requested.        Part B:  A pan-cytokeratin CK-AE1/3 stain is evaluated with appropriate control. The classification of this lesion depends on the location, which appears to be in the distal common bile duct. In this instance, this may be best be regarded as a biliary intraductal papillary neoplasm with low grade dysplasia but clinical, endoscopic and radiologic correlation is required.       Labs:      Imaging  MRI abdomen w wo contrast and mrcp  Result Date: 4/30/2025  Narrative: MRI OF THE ABDOMEN WITH AND WITHOUT CONTRAST WITH MRCP INDICATION: D13.5: Benign neoplasm of  extrahepatic bile ducts. History of ampullary adenoma status post resection. Focal high-grade dysplasia. COMPARISON: MRI/MRCP abdomen 1/21/2025, ERCP 4/15/2025 TECHNIQUE: Multiplanar/multisequence MRI of the abdomen with 3D MRCP was performed before and after administration of contrast. IV Contrast: 5 mL of Gadobutrol injection FINDINGS: LOWER CHEST: Unremarkable. LIVER: Normal in size and configuration. No suspicious mass. Patent hepatic and portal veins. BILE DUCTS: Mild central left lobe intrahepatic bile duct dilation, similar to prior study in retrospect. Signal loss at the hilar confluence on thick section MRCP as before with intact duct on thin section. Evidence of fluid level/pneumobilia in the region of extrahepatic duct stent on axial images. Proximal hepatic duct measures about 6 mm, proximal CBD measures about 2 mm with mid to distal CBD dilated beginning near the level of the pancreatic head estimated about 10 mm corresponding to the indwelling stent, similar to previous study. Otherwise no filling defects or masses. GALLBLADDER: Air-fluid level again noted within the gallbladder. PANCREAS: Unremarkable. The pancreatic duct is normal caliber. ADRENAL GLANDS: Unremarkable. SPLEEN: Normal. KIDNEYS/PROXIMAL URETERS: No hydroureteronephrosis. No suspicious renal mass. Tiny bilateral renal cysts. BOWEL: No dilated loops of bowel. Colonic diverticulosis. PERITONEUM/RETROPERITONEUM: No ascites. LYMPH NODES: No enlarged lymphadenopathy. VESSELS: No aneurysm. ABDOMINAL WALL: Unremarkable. BONES: No suspicious osseous lesion. Chronic deformity anterior endplate of L5.     Impression: Stable appearance of the biliary tree with indwelling CBD stent. No evidence of intraductal mass or suspicious interval changes from prior study. Workstation performed: [id number]     FL ERCP biliary only  Result Date: 4/23/2025  Narrative: ERCP INDICATION: Ampullary adenoma COMPARISON:  None IMAGES:  6 FLUOROSCOPY TIME:   42 sec  CONTRAST: 2 mL of iohexol FINDINGS: Fluoroscopic guidance was provided for performance of ERCP. BILIARY: Initial image demonstrates a plastic and metal stent in the CBD region. Follow-up image demonstrates retrograde wire placement with the removal with new metallic stent in plastic stent.     Impression: Fluoroscopic assistance for CBD stent replacement. Workstation performed: [id number]     ERCP  Addendum Date: 4/15/2025  Addendum: [facility] [address] [facility] [address] [phone] DATE OF SERVICE: 4/15/25 PHYSICIAN(S): Attending: Ten Rodriguez MD Fellow: No Staff Documented INDICATION: Ampullary adenoma POST-OP DIAGNOSIS: See the impression below. PREPROCEDURE: Informed consent was obtained for the procedure, including sedation.  Risks of perforation, hemorrhage, adverse drug reaction and aspiration were discussed. The patient was placed in the left lateral decubitus position. Patient was explained about the risks and benefits of the procedure. Risks including but not limited to bleeding, infection, and perforation were explained in detail. Also explained about less than 100% sensitivity with the exam and other alternatives. PROCEDURE: ERCP DETAILS OF PROCEDURE: Patient was taken to the procedure room where a time out was performed to confirm correct patient and correct procedure. The patient underwent general anesthesia, which was administered by an anesthesia professional. The patient's blood pressure, heart rate, level of consciousness, respirations, oxygen, ECG and ETCO2 were monitored throughout the procedure. The scope was introduced through the mouth. Clinical intention was achieved. The patient experienced no blood loss. The procedure was not difficult. The patient tolerated the procedure well. There were no apparent adverse events. ANESTHESIA INFORMATION: ASA: II Anesthesia Type: General MEDICATIONS: No administrations occurring from 1033 to 1132 on 04/15/25 FINDINGS:  "The stomach and duodenum appeared normal. Patent 10 mm x 4 cm fully covered metal stent was visualized in the common bile duct. The stent was successfully removed using forceps Patent 5 Fr x 3 cm straight plastic stent was visualized in the pancreatic duct. The stent was successfully removed using a snare The common bile duct was deeply cannulated after 1 attempt using a traction sphincterotome with 260 cm x 0.035\" straight guidewire. Cannulation was not difficult. Bile duct was normal in diameter with no filling defects or soft tissue identified on cholangiogram. Sweeping was performed in the proximal common bile duct using a 12 mm balloon. Sludge and debris were removed. Using biopsy forceps the papillary area in the distal duct was sampled, the rim of the major papilla, and the distal duct.  Areas of suspicious tissue were ablated using hot biopsy forceps using hot avulsion technique.  Upon balloon sweeps tissue from the distal duct would protrude outwards, so these areas were removed using a 15 mm hot snare. Tissue was sent to pathology for review. Ablation was performed in the major papilla and distal common bile duct. The lesion was completely ablated with 2 applications of radiofrequency ablation (10 W). Ablation of the distal bile duct was performed using Habib catheter for a total of 90 seconds at 10 espino (higher power) 10 mm x 4 cm fully covered metal stent was placed in the common bile duct 5 Fr x 3 cm straight plastic stent was placed in the pancreatic duct SPECIMENS: ID Type Source Tests Collected by Time Destination 1 : site of ampulectomy Tissue Ampulla of Vater TISSUE EXAM Ten Rodriguez MD 4/15/2025 10:56 AM  2 : distal duct. Hot snare Tissue Common Bile Duct TISSUE EXAM Ten Rodriguez MD 4/15/2025 11:03 AM   IMPRESSION: Patent 10 mm x 4 cm fully covered stent was visualized in the common bile duct. The stent was removed Patent 5 Fr x 3 cm straight stent was visualized in the pancreatic duct. The " stent was removed Sweeping was performed in the proximal common bile duct. Sludge and debris were removed. Using biopsy forceps the papillary area in the distal duct was sampled, the rim of the major papilla, and the distal duct.  Areas of suspicious tissue were ablated using hot biopsy forceps using hot avulsion technique.  Upon balloon sweeps tissue from the distal duct would protrude outwards, so these areas were removed using a 15 mm hot snare. Tissue was sent to pathology for review. Tissue in the major papilla and distal common bile duct was completely ablated with radiofrequency ablation 10 mm x 4 cm fully covered stent was placed in the common bile duct 5 Fr x 3 cm straight stent was placed in the pancreatic duct RECOMMENDATION: Repeat ERCP in 2-3 months for further ablation / resection of residual adenomatous tissue. Continue to follow up with surgical oncology  Ten Rodriguez MD Suggested CPT codes: 43276x2, 17178, 86616     Addendum Date: 4/15/2025  Add[facility]um: [facility] [address] [facility] [address] [phone] DATE OF SERVICE: 4/15/25 PHYSICIAN(S): Attending: Ten Rodriguez MD Fellow: No Staff Documented INDICATION: Ampullary adenoma POST-OP DIAGNOSIS: See the impression below. PREPROCEDURE: Informed consent was obtained for the procedure, including sedation.  Risks of perforation, hemorrhage, adverse drug reaction and aspiration were discussed. The patient was placed in the left lateral decubitus position. Patient was explained about the risks and benefits of the procedure. Risks including but not limited to bleeding, infection, and perforation were explained in detail. Also explained about less than 100% sensitivity with the exam and other alternatives. PROCEDURE: ERCP DETAILS OF PROCEDURE: Patient was taken to the procedure room where a time out was performed to confirm correct patient and correct procedure. The patient underwent general anesthesia, which was  "administered by an anesthesia professional. The patient's blood pressure, heart rate, level of consciousness, respirations, oxygen, ECG and ETCO2 were monitored throughout the procedure. The scope was introduced through the mouth. Clinical intention was achieved. The patient experienced no blood loss. The procedure was not difficult. The patient tolerated the procedure well. There were no apparent adverse events. ANESTHESIA INFORMATION: ASA: II Anesthesia Type: General MEDICATIONS: No administrations occurring from 1033 to 1132 on 04/15/25 FINDINGS: The stomach and duodenum appeared normal. Patent 10 mm x 4 cm fully covered metal stent was visualized in the common bile duct. The stent was successfully removed using forceps Patent 5 Fr x 3 cm straight plastic stent was visualized in the pancreatic duct. The stent was successfully removed using a snare The common bile duct was deeply cannulated after 1 attempt using a traction sphincterotome with 260 cm x 0.035\" straight guidewire. Cannulation was not difficult. Bile duct was normal in diameter with no filling defects or soft tissue identified on cholangiogram. Sweeping was performed in the proximal common bile duct using a 12 mm balloon. Sludge and debris were removed. Using biopsy forceps the papillary area in the distal duct was sampled, the rim of the major papilla, and the distal duct.  Areas of suspicious tissue were ablated using hot biopsy forceps using hot avulsion technique.  Upon balloon sweeps tissue from the distal duct would protrude outwards, so these areas were removed using a 15 mm hot snare. Tissue was sent to pathology for review. Ablation was performed in the major papilla and distal common bile duct. The lesion was completely ablated with 2 applications of radiofrequency ablation (10 W). Ablation of the distal bile duct was performed using Habib catheter for a total of 90 seconds at 10 espino (higher power) 10 mm x 4 cm fully covered metal stent " was placed in the common bile duct 5 Fr x 3 cm straight plastic stent was placed in the pancreatic duct SPECIMENS: ID Type Source Tests Collected by Time Destination 1 : site of ampulectomy Tissue Ampulla of Vater TISSUE EXAM Ten Rodriguez MD 4/15/2025 10:56 AM  2 : distal duct. Hot snare Tissue Common Bile Duct TISSUE EXAM Ten Rodriguez MD 4/15/2025 11:03 AM   IMPRESSION: Patent 10 mm x 4 cm fully covered stent was visualized in the common bile duct. The stent was removed Patent 5 Fr x 3 cm straight stent was visualized in the pancreatic duct. The stent was removed Sweeping was performed in the proximal common bile duct. Sludge and debris were removed. Using biopsy forceps the papillary area in the distal duct was sampled, the rim of the major papilla, and the distal duct.  Areas of suspicious tissue were ablated using hot biopsy forceps using hot avulsion technique.  Upon balloon sweeps tissue from the distal duct would protrude outwards, so these areas were removed using a 15 mm hot snare. Tissue was sent to pathology for review. Tissue in the major papilla and distal common bile duct was completely ablated with radiofrequency ablation 10 mm x 4 cm fully covered stent was placed in the common bile duct 5 Fr x 3 cm straight stent was placed in the pancreatic duct RECOMMENDATION: Repeat ERCP in 2-3 months for further ablation / resection of residual adenomatous tissue. Continue to follow up with surgical oncology   Ten Rodriguez MD     Result Date: 4/15/2025  Narrative: Table formatting from the original result was not included. [facility] End[facility] [address] [facility] [address] [phone] DATE OF SERVICE: 4/15/25 PHYSICIAN(S): Attending: Ten Rodriguez MD Fellow: No Staff Documented INDICATION: Ampullary adenoma POST-OP DIAGNOSIS: See the impression below. PREPROCEDURE: Informed consent was obtained for the procedure, including sedation.  Risks of perforation, hemorrhage, adverse  "drug reaction and aspiration were discussed. The patient was placed in the left lateral decubitus position. Patient was explained about the risks and benefits of the procedure. Risks including but not limited to bleeding, infection, and perforation were explained in detail. Also explained about less than 100% sensitivity with the exam and other alternatives. PROCEDURE: ERCP DETAILS OF PROCEDURE: Patient was taken to the procedure room where a time out was performed to confirm correct patient and correct procedure. The patient underwent general anesthesia, which was administered by an anesthesia professional. The patient's blood pressure, heart rate, level of consciousness, respirations, oxygen, ECG and ETCO2 were monitored throughout the procedure. The scope was introduced through the mouth. Clinical intention was achieved. The patient experienced no blood loss. The procedure was not difficult. The patient tolerated the procedure well. There were no apparent adverse events. ANESTHESIA INFORMATION: ASA: II Anesthesia Type: General MEDICATIONS: No administrations occurring from 1033 to 1132 on 04/15/25 FINDINGS: The stomach and duodenum appeared normal. Patent 10 mm x 4 cm fully covered metal stent was visualized in the common bile duct. The stent was successfully removed using forceps Patent 5 Fr x 3 cm straight plastic stent was visualized in the pancreatic duct. The stent was successfully removed using a snare The common bile duct was deeply cannulated after 1 attempt using a traction sphincterotome with 260 cm x 0.035\" straight guidewire. Cannulation was not difficult. Bile duct was normal in diameter with no filling defects or soft tissue identified on cholangiogram. Sweeping was performed in the proximal common bile duct using a 12 mm balloon. Sludge and debris were removed. Using biopsy forceps the papillary area in the distal duct was sampled, the rim of the major papilla, and the distal duct.  Areas of " suspicious tissue were ablated using hot biopsy forceps using hot avulsion technique.  Upon balloon sweeps tissue from the distal duct would protrude outwards, so these areas were removed using a 15 mm hot snare. Tissue was sent to pathology for review. Ablation was performed in the major papilla and distal common bile duct. The lesion was completely ablated with 2 applications of radiofrequency ablation (10 W). Ablation of the distal bile duct was performed using Habib catheter for a total of 90 seconds at 10 espino (higher power) 10 mm x 4 cm fully covered metal stent was placed in the common bile duct 5 Fr x 3 cm straight plastic stent was placed in the pancreatic duct SPECIMENS: ID Type Source Tests Collected by Time Destination 1 : site of ampulectomy Tissue Ampulla of Vater TISSUE EXAM Ten Rodriguez MD 4/15/2025 10:56 AM  2 : distal duct. Hot snare Tissue Common Bile Duct TISSUE EXAM Ten Rodriguez MD 4/15/2025 11:03 AM       Impression: Patent 10 mm x 4 cm fully covered stent was visualized in the common bile duct. The stent was removed Patent 5 Fr x 3 cm straight stent was visualized in the pancreatic duct. The stent was removed Sweeping was performed in the proximal common bile duct. Sludge and debris were removed. Using biopsy forceps the papillary area in the distal duct was sampled, the rim of the major papilla, and the distal duct.  Areas of suspicious tissue were ablated using hot biopsy forceps using hot avulsion technique.  Upon balloon sweeps tissue from the distal duct would protrude outwards, so these areas were removed using a 15 mm hot snare. Tissue was sent to pathology for review. Tissue in the major papilla and distal common bile duct was completely ablated with radiofrequency ablation 10 mm x 4 cm fully covered stent was placed in the common bile duct 5 Fr x 3 cm straight stent was placed in the pancreatic duct RECOMMENDATION: Repeat ERCP in 2-3 months for further ablation / resection of  residual adenomatous tissue. Continue to follow up with surgical oncology   Ten Rodriguez MD     I personally reviewed and interpreted the above laboratory and imaging data.

## 2025-05-01 NOTE — PROGRESS NOTES
Surgical Oncology Follow Up       [facility]  [facility]  [address]  [address]  [place] [id number] [phone]    Alicia Rai  1955  [id number]  [facility]  [facility]  [address]  [address]  [place] [id number] [phone]    1. 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 There is no obvious evidence of cancer. It is unclear the extent of any involvement of her bile or pancreatic duct.  The MRI shows no obvious masses in this region, although there was some artifact from her biliary stent.  Her MRI has been stable for 3 months.  Her follow-up ERCP shows no evidence of high-grade dysplasia.  I discussed surgical intervention which may require pancreaticoduodenectomy.  We also discussed short-term follow-up.  We discussed the pros and cons of each approach.  Given that she has high-grade dysplasia, I would be comfortable approaching this surgically.  She is somewhat hesitant to have surgery and is in favor of short-term observation.  She is having repeat endoscopic surveillance in 3 months.  I think this is reasonable.  She wishes to push this out to 4 months if possible.  I have recommended repeating her MRI in 3 months.  If her endoscopic surveillance interval increases, we could potentially alternate this with MRI as well. If she has another biopsy with high-grade dysplasia I would strongly favor surgery.  If all her dysplasia is low-grade, she could continue surveillance. I will tentatively see her in 3 months with another MRI. Once again,  her main concern is time under anesthesia since her mother had significant difficulties recovering mentally after a procedure in her 70s. The patient does attribute this to prolonged anesthesia. If we would proceed with  surgery we would have her see surgical optimization as well as preoperative discussion with anesthesia given these concerns. She is agreeable to this plan. All her questions were answered.   Orders:  -     MRI abdomen w wo contrast and mrcp; Future; Expected date: 2025  -     BUN; Future; Expected date: 2025  -     Creatinine, serum; Future; Expected date: 2025         Chief Complaint   Patient presents with    Follow-up     3 month f/u       Return in about 3 months (around 2025) for Ofice visit short, Imaging - See orders.      Oncology History    No history exists.       History of Present Illness:  69-year-old female with a longstanding history of an ampullary adenoma.  These have been resected and ablated for several years.  She was found to have focal high-grade dysplasia in 2021 and then has had serial procedures.  Most recently she had an ERCP on  for this ampullary adenoma.  She had a bile duct and pancreatic stent placed.  There was sludge and debris in the bile duct.  The distal bile duct was sampled and ablation was performed in the major papilla in the distal common bile duct.  Pathology revealed fragments of adenoma with high-grade dysplasia.  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  She comes in now to discuss further therapy.  Her appetite is good.  No unintentional weight loss.  No personal history of pancreatitis.  No family history of pancreas cancer.  Her father did have some type of malignancy but  from diabetes.  She has 2 cousins with breast cancer.  She does drink 2 beers a day for many years as well as an occasional glass of wine.  Over the weekend she may drink 3-4 beers a day.  She has no history of cirrhosis.  She has not had any cross-sectional imaging.      Internal history: Follow-up MRI from 2025 reveals artifact from the bile duct stent.  There is pneumobilia.  There are no filling defects or  masses.  I personally reviewed the films.  She continues to feel well.  Her ERCP from April 15, 2025 revealed the covered stent in the common duct as well as a plastic stent in the pancreatic duct.  Sludge and debris was removed from the proximal bile duct.  Biopsies of the distal bile duct were performed.  Ablation of the major papilla and common bile duct was performed with radiofrequency ablation.    Interval history, 5/1/2025: MRI from April 30, 2025 was stable.  There were no suspicious masses.  ERCP from April 15, 2025 revealed pancreatic and bile duct stents.  Sludge and stones were removed.  Biopsies were performed.  Ablation of the major papilla and distal common bile duct were performed.  Pathology was benign with low-grade dysplasia.  The ampulla had no obvious malignancy.  I have personally reviewed her films.  She is feeling well.  She denies any abdominal pain, nausea or vomiting.    Review of Systems  Complete ROS Surg Onc:   Complete ROS Surg Onc:   Constitutional: The patient denies new or recent history of general fatigue, no recent weight loss, no change in appetite.   Eyes: No complaints of visual problems, no scleral icterus.   ENT: no complaints of ear pain, no hoarseness, no difficulty swallowing,  no tinnitus and no new masses in head, oral cavity, or neck.   Cardiovascular: No complaints of chest pain, no palpitations, no ankle edema.   Respiratory: No complaints of shortness of breath, no cough.   Gastrointestinal: No complaints of jaundice, no bloody stools, no pale stools.   Genitourinary: No complaints of dysuria, no hematuria, no nocturia, no frequent urination, no urethral discharge.   Musculoskeletal: No complaints of weakness, paralysis, joint stiffness or arthralgias.  Integumentary: No complaints of rash, no new lesions.   Neurological: No complaints of convulsions, no seizures, no dizziness.   Hematologic/Lymphatic: No complaints of easy bruising.   Endocrine:  No hot or cold  intolerance.  No polydipsia, polyphagia, or polyuria.  Allergy/immunology:  No environmental allergies.  No food allergies.  Not immunocompromised.  Skin:  No pallor or rash.  No wound.        Patient Active Problem List   Diagnosis    Bile duct abnormality    Tubular adenoma of colon    Seborrheic keratosis    Pure hypercholesterolemia    Osteoporosis without current pathological fracture    Primary osteoarthritis    Colon polyp    Asymptomatic postmenopausal status    Atrophic vaginitis    Pseudoangiomatous stromal hyperplasia of breast    Intraductal papilloma of breast, right    CKD (chronic kidney disease) stage 2, GFR 60-89 ml/min    Ampullary adenoma     Past Medical History:   Diagnosis Date    Colon polyp     Medical history reviewed with no changes     Osteoporosis     Thyroglossal cyst      Past Surgical History:   Procedure Laterality Date    BREAST BIOPSY Left 2018    benign    CATARACT EXTRACTION      COLONOSCOPY      2017    CYSTOSCOPY  07/06/2021    Dr. Jeannette Hadley     DENTAL IMPLANT      EGD  09/27/2022    ERCP  03/16/2021    Bile duct abnormality    AR MASTECTOMY PARTIAL Right 01/11/2024    Procedure: RIGHT BREAST SAIGE  DIRECTED LUMPECTOMY;  Surgeon: Tj Seo MD;  Location: [facility];  Service: Surgical Oncology    THROAT SURGERY      UPPER GASTROINTESTINAL ENDOSCOPY  03/16/2021    Bile duct abnormality    US BREAST CLIP NEEDLE LOC RIGHT Right 01/04/2024    US BREAST CYST ASPIRATION LEFT INITIAL Left 12/12/2017    US GUIDED BREAST BIOPSY RIGHT COMPLETE Right 12/02/2023     Family History   Problem Relation Age of Onset    Dementia Mother     Diabetes Father     No Known Problems Maternal Aunt     No Known Problems Maternal Aunt     No Known Problems Maternal Aunt     Leukemia Maternal Grandmother     Diabetes Paternal Grandmother     Breast cancer Cousin 45    Breast cancer Cousin 45     Social History     Socioeconomic History    Marital status:      Spouse name:  Not on file    Number of children: Not on file    Years of education: Not on file    Highest education level: Not on file   Occupational History    Not on file   Tobacco Use    Smoking status: Former     Current packs/day: 0.00     Average packs/day: 0.3 packs/day for 33.2 years (8.3 ttl pk-yrs)     Types: Cigarettes     Start date: 3/17/1971     Quit date: 3/17/2004     Years since quittin.1     Passive exposure: Past    Smokeless tobacco: Never    Tobacco comments:     quit    Vaping Use    Vaping status: Never Used   Substance and Sexual Activity    Alcohol use: Not Currently     Alcohol/week: 14.0 standard drinks of alcohol     Types: 14 Standard drinks or equivalent per week     Comment: quit     Drug use: Never    Sexual activity: Not Currently     Partners: Male     Birth control/protection: Post-menopausal   Other Topics Concern    Not on file   Social History Narrative    Not on file     Social Drivers of Health     Financial Resource Strain: Low Risk  (2024)    Overall Financial Resource Strain (CARDIA)     Difficulty of Paying Living Expenses: Not hard at all   Food Insecurity: No Food Insecurity (2025)    Hunger Vital Sign     Worried About Running Out of Food in the Last Year: Never true     Ran Out of Food in the Last Year: Never true   Transportation Needs: No Transportation Needs (2025)    PRAPARE - Transportation     Lack of Transportation (Medical): No     Lack of Transportation (Non-Medical): No   Physical Activity: Sufficiently Active (2025)    Exercise Vital Sign     Days of Exercise per Week: 7 days     Minutes of Exercise per Session: 60 min   Stress: No Stress Concern Present (2022)    [employer] - Occupational Stress Questionnaire     Feeling of Stress : Not at all   Social Connections: Not on file   Intimate Partner Violence: Not on file   Housing Stability: Unknown (2025)    Housing Stability Vital Sign      Unable to Pay for Housing in the Last Year: No     Number of Times Moved in the Last Year: Not on file     Homeless in the Last Year: No       Current Outpatient Medications:     calcium carbonate (OS-CARTER) 600 MG tablet, Take 600 mg by mouth daily, Disp: , Rfl:     Cholecalciferol (VITAMIN D3) 1000 units CAPS, Take by mouth in the morning, Disp: , Rfl:     COLLAGEN PO, Take 5,000 mcg by mouth daily, Disp: , Rfl:     folic acid (FOLVITE) 1 mg tablet, Take by mouth daily, Disp: , Rfl:     VITAMIN K PO, Take by mouth, Disp: , Rfl:   No Known Allergies  Vitals:    05/01/25 1103   BP: 118/70   Pulse: 79   Temp: 97.5 °F (36.4 °C)   SpO2: 98%       Physical Exam  Constitutional: General appearance: The Patient is well-developed and well-nourished who appears the stated age in no acute distress. Patient is pleasant and talkative.     HEENT:  Normocephalic.  Sclerae are anicteric. Mucous membranes are moist. Neck is supple without adenopathy. No JVD.     Chest: The lungs are clear to auscultation.     Cardiac: Heart is regular rate.     Abdomen: Abdomen is soft, non-tender, non-distended and without masses.     Extremities: There is no clubbing or cyanosis. There is no edema.  Symmetric.  Neuro: Grossly nonfocal. Gait is normal.     Lymphatic: No evidence of cervical adenopathy bilaterally.   Skin: Warm, anicteric.    Psych:  Patient is pleasant and talkative.  Breasts:        Pathology:  Final Diagnosis   A. DUODENUM, AMPULLA OF ESTRELLITA SITE OF AMPULLECTOMY, BIOPSIES:    - Reactive duodenal mucosa with associated acute inflammatory exudate and fungal organisms; see note.     COMMON BILE DUCT, PAPILLARY LESION, HOT SNARE:    - Low-grade dysplasia; see note.    - Additional levels examined.       Reviewed with agreement in intradepartmental consensus.    Electronically signed by Zach Rubio MD on 4/17/2025 at 1701 EDT   Note    Part A:   There is fibrinous and necrotic acute inflammatory tissue involved by abundant  fungal forms (detected by PAS and GMS). A pan-cytokeratin CK-AE1/3 stain highlights epithelial cells. There is reactive atypia noted, which is favored to be due to the fungal infection, prior biopsy and ablation. Repeat biopsies following eradication of fungal infection is recommended for more complete evaluation of dysplasia, however, as clinically indicated. The morphology may be compatible with Candida species, however correlation with microbiologic cultures is recommended for definitive identification and susceptibilities, as clinically indicated. Tissue is available to send for Universal PCR to identify the fungal species if cultures are not able to be obtained, and as clinically requested.        Part B:  A pan-cytokeratin CK-AE1/3 stain is evaluated with appropriate control. The classification of this lesion depends on the location, which appears to be in the distal common bile duct. In this instance, this may be best be regarded as a biliary intraductal papillary neoplasm with low grade dysplasia but clinical, endoscopic and radiologic correlation is required.       Labs:      Imaging  MRI abdomen w wo contrast and mrcp  Result Date: 4/30/2025  Narrative: MRI OF THE ABDOMEN WITH AND WITHOUT CONTRAST WITH MRCP INDICATION: D13.5: Benign neoplasm of extrahepatic bile ducts. History of ampullary adenoma status post resection. Focal high-grade dysplasia. COMPARISON: MRI/MRCP abdomen 1/21/2025, ERCP 4/15/2025 TECHNIQUE: Multiplanar/multisequence MRI of the abdomen with 3D MRCP was performed before and after administration of contrast. IV Contrast: 5 mL of Gadobutrol injection FINDINGS: LOWER CHEST: Unremarkable. LIVER: Normal in size and configuration. No suspicious mass. Patent hepatic and portal veins. BILE DUCTS: Mild central left lobe intrahepatic bile duct dilation, similar to prior study in retrospect. Signal loss at the hilar confluence on thick section MRCP as before with intact duct on thin section.  Evidence of fluid level/pneumobilia in the region of extrahepatic duct stent on axial images. Proximal hepatic duct measures about 6 mm, proximal CBD measures about 2 mm with mid to distal CBD dilated beginning near the level of the pancreatic head estimated about 10 mm corresponding to the indwelling stent, similar to previous study. Otherwise no filling defects or masses. GALLBLADDER: Air-fluid level again noted within the gallbladder. PANCREAS: Unremarkable. The pancreatic duct is normal caliber. ADRENAL GLANDS: Unremarkable. SPLEEN: Normal. KIDNEYS/PROXIMAL URETERS: No hydroureteronephrosis. No suspicious renal mass. Tiny bilateral renal cysts. BOWEL: No dilated loops of bowel. Colonic diverticulosis. PERITONEUM/RETROPERITONEUM: No ascites. LYMPH NODES: No enlarged lymphadenopathy. VESSELS: No aneurysm. ABDOMINAL WALL: Unremarkable. BONES: No suspicious osseous lesion. Chronic deformity anterior endplate of L5.     Impression: Stable appearance of the biliary tree with indwelling CBD stent. No evidence of intraductal mass or suspicious interval changes from prior study. Workstation performed: [id number]     [place] ERCP biliary only  Result Date: 4/23/2025  Narrative: ERCP INDICATION: Ampullary adenoma COMPARISON:  None IMAGES:  6 FLUOROSCOPY TIME:   42 sec CONTRAST: 2 mL of iohexol FINDINGS: Fluoroscopic guidance was provided for performance of ERCP. BILIARY: Initial image demonstrates a plastic and metal stent in the CBD region. Follow-up image demonstrates retrograde wire placement with the removal with new metallic stent in plastic stent.     Impression: Fluoroscopic assistance for CBD stent replacement. Workstation performed: [id number]     ERCP  Addendum Date: 4/15/2025  Addendum: [facility] Endoscopy [address] [place] [address] [phone] DATE OF SERVICE: 4/15/25 PHYSICIAN(S): Attending: Ten Rodriguez MD Fellow: No Staff Documented INDICATION: Ampullary adenoma POST-OP DIAGNOSIS:  "See the impression below. PREPROCEDURE: Informed consent was obtained for the procedure, including sedation.  Risks of perforation, hemorrhage, adverse drug reaction and aspiration were discussed. The patient was placed in the left lateral decubitus position. Patient was explained about the risks and benefits of the procedure. Risks including but not limited to bleeding, infection, and perforation were explained in detail. Also explained about less than 100% sensitivity with the exam and other alternatives. PROCEDURE: ERCP DETAILS OF PROCEDURE: Patient was taken to the procedure room where a time out was performed to confirm correct patient and correct procedure. The patient underwent general anesthesia, which was administered by an anesthesia professional. The patient's blood pressure, heart rate, level of consciousness, respirations, oxygen, ECG and ETCO2 were monitored throughout the procedure. The scope was introduced through the mouth. Clinical intention was achieved. The patient experienced no blood loss. The procedure was not difficult. The patient tolerated the procedure well. There were no apparent adverse events. ANESTHESIA INFORMATION: ASA: II Anesthesia Type: General MEDICATIONS: No administrations occurring from 1033 to 1132 on 04/15/25 FINDINGS: The stomach and duodenum appeared normal. Patent 10 mm x 4 cm fully covered metal stent was visualized in the common bile duct. The stent was successfully removed using forceps Patent 5 Fr x 3 cm straight plastic stent was visualized in the pancreatic duct. The stent was successfully removed using a snare The common bile duct was deeply cannulated after 1 attempt using a traction sphincterotome with 260 cm x 0.035\" straight guidewire. Cannulation was not difficult. Bile duct was normal in diameter with no filling defects or soft tissue identified on cholangiogram. Sweeping was performed in the proximal common bile duct using a 12 mm balloon. Sludge and debris " were removed. Using biopsy forceps the papillary area in the distal duct was sampled, the rim of the major papilla, and the distal duct.  Areas of suspicious tissue were ablated using hot biopsy forceps using hot avulsion technique.  Upon balloon sweeps tissue from the distal duct would protrude outwards, so these areas were removed using a 15 mm hot snare. Tissue was sent to pathology for review. Ablation was performed in the major papilla and distal common bile duct. The lesion was completely ablated with 2 applications of radiofrequency ablation (10 W). Ablation of the distal bile duct was performed using Habib catheter for a total of 90 seconds at 10 espino (higher power) 10 mm x 4 cm fully covered metal stent was placed in the common bile duct 5 Fr x 3 cm straight plastic stent was placed in the pancreatic duct SPECIMENS: ID Type Source Tests Collected by Time Destination 1 : site of ampulectomy Tissue Ampulla of Vater TISSUE EXAM Ten Rodriguez MD 4/15/2025 10:56 AM  2 : distal duct. Hot snare Tissue Common Bile Duct TISSUE EXAM Ten Rodriguez MD 4/15/2025 11:03 AM   IMPRESSION: Patent 10 mm x 4 cm fully covered stent was visualized in the common bile duct. The stent was removed Patent 5 Fr x 3 cm straight stent was visualized in the pancreatic duct. The stent was removed Sweeping was performed in the proximal common bile duct. Sludge and debris were removed. Using biopsy forceps the papillary area in the distal duct was sampled, the rim of the major papilla, and the distal duct.  Areas of suspicious tissue were ablated using hot biopsy forceps using hot avulsion technique.  Upon balloon sweeps tissue from the distal duct would protrude outwards, so these areas were removed using a 15 mm hot snare. Tissue was sent to pathology for review. Tissue in the major papilla and distal common bile duct was completely ablated with radiofrequency ablation 10 mm x 4 cm fully covered stent was placed in the common bile  duct 5 Fr x 3 cm straight stent was placed in the pancreatic duct RECOMMENDATION: Repeat ERCP in 2-3 months for further ablation / resection of residual adenomatous tissue. Continue to follow up with surgical oncology  Ten Rodriguez MD Suggested CPT codes: 43276x2, 47964, 44175     Addendum Date: 4/15/2025  Addendum: [facility] Endoscopy [address] [facility] [address] [phone] DATE OF SERVICE: 4/15/25 PHYSICIAN(S): Attending: Ten Rodriguez MD Fellow: No Staff Documented INDICATION: Ampullary adenoma POST-OP DIAGNOSIS: See the impression below. PREPROCEDURE: Informed consent was obtained for the procedure, including sedation.  Risks of perforation, hemorrhage, adverse drug reaction and aspiration were discussed. The patient was placed in the left lateral decubitus position. Patient was explained about the risks and benefits of the procedure. Risks including but not limited to bleeding, infection, and perforation were explained in detail. Also explained about less than 100% sensitivity with the exam and other alternatives. PROCEDURE: ERCP DETAILS OF PROCEDURE: Patient was taken to the procedure room where a time out was performed to confirm correct patient and correct procedure. The patient underwent general anesthesia, which was administered by an anesthesia professional. The patient's blood pressure, heart rate, level of consciousness, respirations, oxygen, ECG and ETCO2 were monitored throughout the procedure. The scope was introduced through the mouth. Clinical intention was achieved. The patient experienced no blood loss. The procedure was not difficult. The patient tolerated the procedure well. There were no apparent adverse events. ANESTHESIA INFORMATION: ASA: II Anesthesia Type: General MEDICATIONS: No administrations occurring from 1033 to 1132 on 04/15/25 FINDINGS: The stomach and duodenum appeared normal. Patent 10 mm x 4 cm fully covered metal stent was visualized in the common  "bile duct. The stent was successfully removed using forceps Patent 5 Fr x 3 cm straight plastic stent was visualized in the pancreatic duct. The stent was successfully removed using a snare The common bile duct was deeply cannulated after 1 attempt using a traction sphincterotome with 260 cm x 0.035\" straight guidewire. Cannulation was not difficult. Bile duct was normal in diameter with no filling defects or soft tissue identified on cholangiogram. Sweeping was performed in the proximal common bile duct using a 12 mm balloon. Sludge and debris were removed. Using biopsy forceps the papillary area in the distal duct was sampled, the rim of the major papilla, and the distal duct.  Areas of suspicious tissue were ablated using hot biopsy forceps using hot avulsion technique.  Upon balloon sweeps tissue from the distal duct would protrude outwards, so these areas were removed using a 15 mm hot snare. Tissue was sent to pathology for review. Ablation was performed in the major papilla and distal common bile duct. The lesion was completely ablated with 2 applications of radiofrequency ablation (10 W). Ablation of the distal bile duct was performed using Habib catheter for a total of 90 seconds at 10 espino (higher power) 10 mm x 4 cm fully covered metal stent was placed in the common bile duct 5 Fr x 3 cm straight plastic stent was placed in the pancreatic duct SPECIMENS: ID Type Source Tests Collected by Time Destination 1 : site of ampulectomy Tissue Ampulla of Vater TISSUE EXAM Ten Rodriguez MD 4/15/2025 10:56 AM  2 : distal duct. Hot snare Tissue Common Bile Duct TISSUE EXAM Ten Rodriguez MD 4/15/2025 11:03 AM   IMPRESSION: Patent 10 mm x 4 cm fully covered stent was visualized in the common bile duct. The stent was removed Patent 5 Fr x 3 cm straight stent was visualized in the pancreatic duct. The stent was removed Sweeping was performed in the proximal common bile duct. Sludge and debris were removed. Using " biopsy forceps the papillary area in the distal duct was sampled, the rim of the major papilla, and the distal duct.  Areas of suspicious tissue were ablated using hot biopsy forceps using hot avulsion technique.  Upon balloon sweeps tissue from the distal duct would protrude outwards, so these areas were removed using a 15 mm hot snare. Tissue was sent to pathology for review. Tissue in the major papilla and distal common bile duct was completely ablated with radiofrequency ablation 10 mm x 4 cm fully covered stent was placed in the common bile duct 5 Fr x 3 cm straight stent was placed in the pancreatic duct RECOMMENDATION: Repeat ERCP in 2-3 months for further ablation / resection of residual adenomatous tissue. Continue to follow up with surgical oncology   Ten Rodriguez MD     Result Date: 4/15/2025  Narrative: Table formatting from the original result was not included. [facility] [address] [facility] [id number] [phone] DATE OF SERVICE: 4/15/25 PHYSICIAN(S): Attending: Ten Rodriguez MD Fellow: No Staff Documented INDICATION: Ampullary adenoma POST-OP DIAGNOSIS: See the impression below. PREPROCEDURE: Informed consent was obtained for the procedure, including sedation.  Risks of perforation, hemorrhage, adverse drug reaction and aspiration were discussed. The patient was placed in the left lateral decubitus position. Patient was explained about the risks and benefits of the procedure. Risks including but not limited to bleeding, infection, and perforation were explained in detail. Also explained about less than 100% sensitivity with the exam and other alternatives. PROCEDURE: ERCP DETAILS OF PROCEDURE: Patient was taken to the procedure room where a time out was performed to confirm correct patient and correct procedure. The patient underwent general anesthesia, which was administered by an anesthesia professional. The patient's blood pressure, heart rate, level of  "consciousness, respirations, oxygen, ECG and ETCO2 were monitored throughout the procedure. The scope was introduced through the mouth. Clinical intention was achieved. The patient experienced no blood loss. The procedure was not difficult. The patient tolerated the procedure well. There were no apparent adverse events. ANESTHESIA INFORMATION: ASA: II Anesthesia Type: General MEDICATIONS: No administrations occurring from 1033 to 1132 on 04/15/25 FINDINGS: The stomach and duodenum appeared normal. Patent 10 mm x 4 cm fully covered metal stent was visualized in the common bile duct. The stent was successfully removed using forceps Patent 5 Fr x 3 cm straight plastic stent was visualized in the pancreatic duct. The stent was successfully removed using a snare The common bile duct was deeply cannulated after 1 attempt using a traction sphincterotome with 260 cm x 0.035\" straight guidewire. Cannulation was not difficult. Bile duct was normal in diameter with no filling defects or soft tissue identified on cholangiogram. Sweeping was performed in the proximal common bile duct using a 12 mm balloon. Sludge and debris were removed. Using biopsy forceps the papillary area in the distal duct was sampled, the rim of the major papilla, and the distal duct.  Areas of suspicious tissue were ablated using hot biopsy forceps using hot avulsion technique.  Upon balloon sweeps tissue from the distal duct would protrude outwards, so these areas were removed using a 15 mm hot snare. Tissue was sent to pathology for review. Ablation was performed in the major papilla and distal common bile duct. The lesion was completely ablated with 2 applications of radiofrequency ablation (10 W). Ablation of the distal bile duct was performed using Habib catheter for a total of 90 seconds at 10 espino (higher power) 10 mm x 4 cm fully covered metal stent was placed in the common bile duct 5 Fr x 3 cm straight plastic stent was placed in the " pancreatic duct SPECIMENS: ID Type Source Tests Collected by Time Destination 1 : site of ampulectomy Tissue Ampulla of Vater TISSUE EXAM Ten Rodriguez MD 4/15/2025 10:56 AM  2 : distal duct. Hot snare Tissue Common Bile Duct TISSUE EXAM Ten Rodriguez MD 4/15/2025 11:03 AM       Impression: Patent 10 mm x 4 cm fully covered stent was visualized in the common bile duct. The stent was removed Patent 5 Fr x 3 cm straight stent was visualized in the pancreatic duct. The stent was removed Sweeping was performed in the proximal common bile duct. Sludge and debris were removed. Using biopsy forceps the papillary area in the distal duct was sampled, the rim of the major papilla, and the distal duct.  Areas of suspicious tissue were ablated using hot biopsy forceps using hot avulsion technique.  Upon balloon sweeps tissue from the distal duct would protrude outwards, so these areas were removed using a 15 mm hot snare. Tissue was sent to pathology for review. Tissue in the major papilla and distal common bile duct was completely ablated with radiofrequency ablation 10 mm x 4 cm fully covered stent was placed in the common bile duct 5 Fr x 3 cm straight stent was placed in the pancreatic duct RECOMMENDATION: Repeat ERCP in 2-3 months for further ablation / resection of residual adenomatous tissue. Continue to follow up with surgical oncology   Ten Rodriguez MD     I personally reviewed and interpreted the above laboratory and imaging data.

## 2025-05-01 NOTE — ASSESSMENT & PLAN NOTE
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 There is no obvious evidence of cancer. It is unclear the extent of any involvement of her bile or pancreatic duct.  The MRI shows no obvious masses in this region, although there was some artifact from her biliary stent.  Her MRI has been stable for 3 months.  Her follow-up ERCP shows no evidence of high-grade dysplasia.  I discussed surgical intervention which may require pancreaticoduodenectomy.  We also discussed short-term follow-up.  We discussed the pros and cons of each approach.  Given that she has high-grade dysplasia, I would be comfortable approaching this surgically.  She is somewhat hesitant to have surgery and is in favor of short-term observation.  She is having repeat endoscopic surveillance in 3 months.  I think this is reasonable.  She wishes to push this out to 4 months if possible.  I have recommended repeating her MRI in 3 months.  If her endoscopic surveillance interval increases, we could potentially alternate this with MRI as well. If she has another biopsy with high-grade dysplasia I would strongly favor surgery.  If all her dysplasia is low-grade, she could continue surveillance. I will tentatively see her in 3 months with another MRI. Once again,  her main concern is time under anesthesia since her mother had significant difficulties recovering mentally after a procedure in her 70s. The patient does attribute this to prolonged anesthesia. If we would proceed with surgery we would have her see surgical optimization as well as preoperative discussion with anesthesia given these concerns. She is agreeable to this plan. All her questions were answered.

## 2025-06-09 ENCOUNTER — OFFICE VISIT (OUTPATIENT)
Dept: OBGYN CLINIC | Facility: CLINIC | Age: 70
End: 2025-06-09
Payer: MEDICARE

## 2025-06-09 ENCOUNTER — PREP FOR PROCEDURE (OUTPATIENT)
Dept: GASTROENTEROLOGY | Facility: CLINIC | Age: 70
End: 2025-06-09

## 2025-06-09 ENCOUNTER — TELEPHONE (OUTPATIENT)
Dept: GASTROENTEROLOGY | Facility: CLINIC | Age: 70
End: 2025-06-09

## 2025-06-09 ENCOUNTER — APPOINTMENT (OUTPATIENT)
Dept: RADIOLOGY | Facility: CLINIC | Age: 70
End: 2025-06-09
Attending: STUDENT IN AN ORGANIZED HEALTH CARE EDUCATION/TRAINING PROGRAM
Payer: MEDICARE

## 2025-06-09 VITALS — WEIGHT: 118.8 LBS | HEIGHT: 62 IN | BODY MASS INDEX: 21.86 KG/M2

## 2025-06-09 DIAGNOSIS — Z86.0100 HISTORY OF COLON POLYPS: Primary | ICD-10-CM

## 2025-06-09 DIAGNOSIS — S86.912A KNEE STRAIN, LEFT, INITIAL ENCOUNTER: Primary | ICD-10-CM

## 2025-06-09 PROCEDURE — 99203 OFFICE O/P NEW LOW 30 MIN: CPT | Performed by: STUDENT IN AN ORGANIZED HEALTH CARE EDUCATION/TRAINING PROGRAM

## 2025-06-09 PROCEDURE — 73564 X-RAY EXAM KNEE 4 OR MORE: CPT

## 2025-06-09 NOTE — TELEPHONE ENCOUNTER
Called sierra let he know she has a recall Colon with Dr Olvera in July    Asked if she wanted to add it to the ERCP scheduled with Dr Rodriguez   she would like Dr Rodrgiuez to do the Colon so she will add it  Reviewed prep and My Charted

## 2025-06-09 NOTE — PROGRESS NOTES
ASSESSMENT/PLAN:    Assessment & Plan  Knee strain, left, initial encounter       Discussed history, exam, and imaging with patient. Presentation most consistent with left knee strain and we will plan for nonoperative management at this time.  Discussed oral/topical medication regimen. Will plan for over-the-counter Tylenol and ibuprofen as needed for symptomatic control.  Discussed prescription strength anti-inflammatories, which she would like to avoid oral medications if possible.  Topical diclofenac gel is also an option for symptomatic relief.  Discussed injections as a pain adjunct.  This includes either viscosupplementation or corticosteroid.  However, she has no significant pathology on her knee x-ray and her symptoms have been present for an overall short amount of time.  Deferred at today's visit.  Discussed rehabilitation efforts. Will plan for continued home exercise program with focus on strengthening of the dynamic stabilizers of the knee.  She does have some mild weakness on examination and it is likely that with strengthening of her dynamic stabilizers including the quadriceps, hamstrings, gastroc, and her core, that she will have resolution of her symptoms and decrease the likelihood that she will have this again in the future.  Discussed advanced imaging in the form of MRI which can be considered if she continues to have notable difficulty despite activity modification and a home exercise program.    Return if symptoms worsen or fail to improve.    _____________________________________________________  CHIEF COMPLAINT:  Chief Complaint   Patient presents with    Left Knee - Pain     New Patient. XR ordered today. Symptoms for about a month. Pain is triggered by certain movements, bending, etc. Denies any previous surgery.       SUBJECTIVE:  Alicia Rai is a 70 y.o. year old female who presents for evaluation of left knee. The issue began approximately 1 month ago without traumatic onset.  She  "thinks that maybe her symptoms started after she was trying pants and a tag scratched the back of her knee which caused some bleeding.  It seemed like a relatively innocuous injury but that is the only thing she can think of that happened out of the norm prior to the onset of her knee pain.  2 weeks after that incident she bent down in the kitchen and noted she has significant discomfort as well as stiffness that has developed to the knee.  Currently she localizes pain to the posterior aspect of her knee and is chiefly present with terminal flexion or with attempts at bending the knee.  She rates it as a 6 out of 10 in severity.  Recently she was outside painting the house and weed [employer] and had trouble getting up because of the level of pain she had when trying to bend the knee.  Wearing a knee brace has helped.  Cannot recall any severe swelling that developed.  She has not tried any oral medications.  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      PAST MEDICAL HISTORY:  Past Medical History[1]    PAST SURGICAL HISTORY:  Past Surgical History[2]    FAMILY HISTORY:  Family History[3]    SOCIAL HISTORY:  Social History[4]    MEDICATIONS:  Current Medications[5]    ALLERGIES:  Allergies[6]    Review of systems:   Constitutional: Negative for fatigue, fever or loss of apetite.   HENT: Negative.    Respiratory: Negative for shortness of breath, dyspnea.    Cardiovascular: Negative for chest pain/tightness.   Gastrointestinal: Negative for abdominal pain, N/V.   Endocrine: Negative for cold/heat intolerance, unexplained weight loss/gain.   Genitourinary: Negative for flank pain, dysuria, hematuria.   Musculoskeletal: As in HPI   Skin: Negative for rash.    Neurological: Negative for numbness tingling  Psychiatric/Behavioral: Negative for agitation.  _____________________________________________________  PHYSICAL EXAMINATION:    Height 5' 2\" (1.575 m), weight 53.9 kg (118 lb 12.8 oz), not " currently breastfeeding.    General: well developed and well nourished, alert, oriented times 3, and appears comfortable  HEENT: Benign, normocephalic, atraumatic  Cardiovascular: regular rate    Pulmonary: No wheezing or stridor  Abdomen: Soft, Nontender  Skin: No masses, erythema, lacerations, fluctation, ulcerations  Neurovascular: as per MSK exam below    MUSCULOSKELETAL EXAMINATION:    Left Knee  Ambulates with normal gait  No bruising, swelling, deformity  No significant joint line tenderness  Passive ROM 0 - 120, - crepitus  - Anaya's, - Kwadwo's  Stable to varus/valgus stress at 0 and 30 degrees  Normal Lachman  - Anterior Drawer, - Posterior Drawer  4+/5 quad, 4+/5 hamstring strength  SILT all exposed distal distributions  2+ PT pulse     _____________________________________________________  STUDIES REVIEWED:  Images personally reviewed by me today     4 views of left knee obtained in office today reviewed which demonstrate no evidence of fracture, dislocation or notable degenerative process.  No overt evidence of significant osteopenia.      Teddy Castro MD          [1]   Past Medical History:  Diagnosis Date    Anxiety     Colon polyp 03/01/2019    adenomatous    Medical history reviewed with no changes     Obsessive-compulsive disorder     Osteoporosis     Thyroglossal cyst    [2]   Past Surgical History:  Procedure Laterality Date    BREAST BIOPSY Left 2018    benign    CATARACT EXTRACTION      COLONOSCOPY  03/01/2019    2017    CYSTOSCOPY  07/06/2021    Dr. Jeannette Hadley     DENTAL IMPLANT      EGD  09/27/2022    ERCP  03/16/2021    Bile duct abnormality    EYE SURGERY      Cataract Removal    DE MASTECTOMY PARTIAL Right 01/11/2024    Procedure: RIGHT BREAST SAIGE  DIRECTED LUMPECTOMY;  Surgeon: Tj Seo MD;  Location: [facility];  Service: Surgical Oncology    THROAT SURGERY      UPPER GASTROINTESTINAL ENDOSCOPY  03/16/2021    Bile duct abnormality    US BREAST CLIP NEEDLE  LOC RIGHT Right 2024    US BREAST CYST ASPIRATION LEFT INITIAL Left 2017    US GUIDED BREAST BIOPSY RIGHT COMPLETE Right 2023   [3]   Family History  Problem Relation Name Age of Onset    Dementia Mother Stormy House         Brought on by anesthesia for knee surgery    Diabetes Father ELAN House     No Known Problems Maternal Aunt      No Known Problems Maternal Aunt      No Known Problems Maternal Aunt      Leukemia Maternal Grandmother      Diabetes Paternal Grandmother      Breast cancer Cousin Hal-Maternal 45    Breast cancer Cousin Dimple-Maternal 45    Alcohol abuse Brother Shahid House     COPD Brother Sage House    [4]   Social History  Tobacco Use    Smoking status: Former     Current packs/day: 0.00     Average packs/day: 0.3 packs/day for 33.2 years (8.3 ttl pk-yrs)     Types: Cigarettes     Start date: 3/17/1971     Quit date: 3/17/2004     Years since quittin.2     Passive exposure: Past    Smokeless tobacco: Never    Tobacco comments:     quit    Vaping Use    Vaping status: Never Used   Substance Use Topics    Alcohol use: Yes     Alcohol/week: 24.0 standard drinks of alcohol     Types: 10 Cans of beer, 14 Standard drinks or equivalent per week     Comment: quit     Drug use: Never   [5]   Current Outpatient Medications:     calcium carbonate (OS-CARTER) 600 MG tablet, Take 600 mg by mouth in the morning., Disp: , Rfl:     Cholecalciferol (VITAMIN D3) 1000 units CAPS, Take by mouth in the morning, Disp: , Rfl:     COLLAGEN PO, Take 5,000 mcg by mouth in the morning., Disp: , Rfl:     folic acid (FOLVITE) 1 mg tablet, Take by mouth in the morning., Disp: , Rfl:     VITAMIN K PO, Take by mouth, Disp: , Rfl:   [6] No Known Allergies

## 2025-07-14 ENCOUNTER — TELEPHONE (OUTPATIENT)
Dept: GASTROENTEROLOGY | Facility: HOSPITAL | Age: 70
End: 2025-07-14

## 2025-07-15 ENCOUNTER — HOSPITAL ENCOUNTER (OUTPATIENT)
Dept: GASTROENTEROLOGY | Facility: HOSPITAL | Age: 70
Setting detail: OUTPATIENT SURGERY
Discharge: HOME | End: 2025-07-15
Attending: INTERNAL MEDICINE
Payer: MEDICARE

## 2025-07-15 ENCOUNTER — ANESTHESIA EVENT (OUTPATIENT)
Dept: GASTROENTEROLOGY | Facility: HOSPITAL | Age: 70
End: 2025-07-15
Payer: MEDICARE

## 2025-07-15 ENCOUNTER — HOSPITAL ENCOUNTER (OUTPATIENT)
Dept: RADIOLOGY | Facility: HOSPITAL | Age: 70
Discharge: HOME | End: 2025-07-15
Payer: MEDICARE

## 2025-07-15 ENCOUNTER — ANESTHESIA (OUTPATIENT)
Dept: GASTROENTEROLOGY | Facility: HOSPITAL | Age: 70
End: 2025-07-15
Payer: MEDICARE

## 2025-07-15 VITALS
SYSTOLIC BLOOD PRESSURE: 129 MMHG | HEART RATE: 89 BPM | HEIGHT: 63 IN | WEIGHT: 118 LBS | RESPIRATION RATE: 17 BRPM | BODY MASS INDEX: 20.91 KG/M2 | TEMPERATURE: 96.9 F | OXYGEN SATURATION: 98 % | DIASTOLIC BLOOD PRESSURE: 96 MMHG

## 2025-07-15 DIAGNOSIS — K83.9 BILE DUCT ABNORMALITY: ICD-10-CM

## 2025-07-15 DIAGNOSIS — D13.5 AMPULLARY ADENOMA: ICD-10-CM

## 2025-07-15 DIAGNOSIS — Z86.0100 HISTORY OF COLON POLYPS: ICD-10-CM

## 2025-07-15 PROCEDURE — 88311 DECALCIFY TISSUE: CPT | Performed by: PATHOLOGY

## 2025-07-15 PROCEDURE — 74328 X-RAY BILE DUCT ENDOSCOPY: CPT

## 2025-07-15 PROCEDURE — 88305 TISSUE EXAM BY PATHOLOGIST: CPT | Performed by: PATHOLOGY

## 2025-07-15 PROCEDURE — C1769 GUIDE WIRE: HCPCS

## 2025-07-15 PROCEDURE — C1874 STENT, COATED/COV W/DEL SYS: HCPCS

## 2025-07-15 RX ORDER — PROPOFOL 10 MG/ML
INJECTION, EMULSION INTRAVENOUS AS NEEDED
Status: DISCONTINUED | OUTPATIENT
Start: 2025-07-15 | End: 2025-07-15

## 2025-07-15 RX ORDER — SUCCINYLCHOLINE/SOD CL,ISO/PF 100 MG/5ML
SYRINGE (ML) INTRAVENOUS AS NEEDED
Status: DISCONTINUED | OUTPATIENT
Start: 2025-07-15 | End: 2025-07-15

## 2025-07-15 RX ORDER — LIDOCAINE HCL/PF 100 MG/5ML
SYRINGE (ML) INJECTION AS NEEDED
Status: DISCONTINUED | OUTPATIENT
Start: 2025-07-15 | End: 2025-07-15

## 2025-07-15 RX ORDER — SODIUM CHLORIDE, SODIUM LACTATE, POTASSIUM CHLORIDE, CALCIUM CHLORIDE 600; 310; 30; 20 MG/100ML; MG/100ML; MG/100ML; MG/100ML
INJECTION, SOLUTION INTRAVENOUS CONTINUOUS PRN
Status: DISCONTINUED | OUTPATIENT
Start: 2025-07-15 | End: 2025-07-15

## 2025-07-15 RX ORDER — ASCORBIC ACID 500 MG
500 TABLET ORAL DAILY
COMMUNITY

## 2025-07-15 RX ORDER — DEXAMETHASONE SODIUM PHOSPHATE 10 MG/ML
INJECTION, SOLUTION INTRAMUSCULAR; INTRAVENOUS AS NEEDED
Status: DISCONTINUED | OUTPATIENT
Start: 2025-07-15 | End: 2025-07-15

## 2025-07-15 RX ORDER — ONDANSETRON 2 MG/ML
INJECTION INTRAMUSCULAR; INTRAVENOUS AS NEEDED
Status: DISCONTINUED | OUTPATIENT
Start: 2025-07-15 | End: 2025-07-15

## 2025-07-15 RX ADMIN — LIDOCAINE HYDROCHLORIDE 75 MG: 20 INJECTION INTRAVENOUS at 12:42

## 2025-07-15 RX ADMIN — DEXAMETHASONE SODIUM PHOSPHATE 10 MG: 10 INJECTION, SOLUTION INTRAMUSCULAR; INTRAVENOUS at 12:48

## 2025-07-15 RX ADMIN — Medication 100 MG: at 12:43

## 2025-07-15 RX ADMIN — PROPOFOL 200 MG: 10 INJECTION, EMULSION INTRAVENOUS at 12:42

## 2025-07-15 RX ADMIN — ONDANSETRON 4 MG: 2 INJECTION INTRAMUSCULAR; INTRAVENOUS at 12:48

## 2025-07-15 RX ADMIN — SODIUM CHLORIDE, SODIUM LACTATE, POTASSIUM CHLORIDE, AND CALCIUM CHLORIDE: .6; .31; .03; .02 INJECTION, SOLUTION INTRAVENOUS at 12:33

## 2025-07-15 RX ADMIN — IOHEXOL 5 ML: 240 INJECTION, SOLUTION INTRATHECAL; INTRAVASCULAR; INTRAVENOUS; ORAL at 13:20

## 2025-07-16 ENCOUNTER — TELEPHONE (OUTPATIENT)
Age: 70
End: 2025-07-16

## 2025-07-16 ENCOUNTER — OFFICE VISIT (OUTPATIENT)
Age: 70
End: 2025-07-16
Payer: MEDICARE

## 2025-07-16 VITALS
DIASTOLIC BLOOD PRESSURE: 64 MMHG | BODY MASS INDEX: 20.94 KG/M2 | TEMPERATURE: 98.3 F | HEIGHT: 63 IN | WEIGHT: 118.2 LBS | OXYGEN SATURATION: 98 % | SYSTOLIC BLOOD PRESSURE: 124 MMHG | HEART RATE: 88 BPM

## 2025-07-16 DIAGNOSIS — L81.4 SOLAR LENTIGO: ICD-10-CM

## 2025-07-16 DIAGNOSIS — L72.0 MILIA: ICD-10-CM

## 2025-07-16 DIAGNOSIS — L82.1 SEBORRHEIC KERATOSIS: ICD-10-CM

## 2025-07-16 DIAGNOSIS — D18.01 CHERRY ANGIOMA: ICD-10-CM

## 2025-07-16 DIAGNOSIS — D22.9 MULTIPLE NEVI: Primary | ICD-10-CM

## 2025-07-16 PROCEDURE — 99213 OFFICE O/P EST LOW 20 MIN: CPT | Performed by: NURSE PRACTITIONER

## 2025-07-16 NOTE — PATIENT INSTRUCTIONS
"MELANOCYTIC NEVI (\"Moles\")    Assessment and Plan:  Based on a thorough discussion of this condition and the management approach to it (including a comprehensive discussion of the known risks, side effects and potential benefits of treatment), the patient (family) agrees to implement the following specific plan:  Provided handout with information regarding the ABCDE's of moles   Recommend routine skin exams every year.    Sun avoidance, protective clothing (known as UPF clothing), and the use of at least SPF 30 sunscreens is advised. Sunscreen should be reapplied every two hours when outside.     SEBORRHEIC KERATOSIS; NON-INFLAMED    Assessment and Plan:  Based on a thorough discussion of this condition and the management approach to it (including a comprehensive discussion of the known risks, side effects and potential benefits of treatment), the patient (family) agrees to implement the following specific plan:  Reassured benign    ANGIOMA (\"CHERRY ANGIOMA\")    Assessment and Plan:  Reassured benign    LENTIGO    Assessment and Plan:  Based on a thorough discussion of this condition and the management approach to it (including a comprehensive discussion of the known risks, side effects and potential benefits of treatment), the patient (family) agrees to implement the following specific plan:  When outside we recommend using a wide brim hat, sunglasses, long sleeve and pants, sunscreen with SPF 30+ with reapplication every 2 hours, or SPF specific clothing       What is a lentigo?  A lentigo is a pigmented flat or slightly raised lesion with a clearly defined edge. Unlike an ephelis (freckle), it does not fade in the winter months. There are several kinds of lentigo.  The name lentigo originally referred to its appearance resembling a small lentil. The plural of lentigo is lentigines, although “lentigos” is also in common use.    Who gets lentigines?  Lentigines can affect males and females of all ages and races. " Solar lentigines are especially prevalent in fair skinned adults. Lentigines associated with syndromes are present at birth or arise during childhood.    What causes lentigines?  Common forms of lentigo are due to exposure to ultraviolet radiation:  Sun damage including sunburn   Indoor tanning   Phototherapy, especially photochemotherapy (PUVA)    Ionizing radiation, eg radiation therapy, can also cause lentigines.  Several familial syndromes associated with widespread lentigines originate from mutations in Sam-MAP kinase, mTOR signaling and PTEN pathways.    What is the treatment for lentigines?  Most lentigines are left alone. Attempts to lighten them may not be successful. The following approaches are used:  SPF 50+ broad-spectrum sunscreen   Hydroquinone bleaching cream   Alpha hydroxy acids   Vitamin C   Retinoids   Azelaic acid   Chemical peels  Individual lesions can be permanently removed using:  Cryotherapy   Intense pulsed light   Pigment lasers    How can lentigines be prevented?  Lentigines associated with exposure ultraviolet radiation can be prevented by very careful sun protection. Clothing is more successful at preventing new lentigines than are sunscreens.    What is the outlook for lentigines?  Lentigines usually persist. They may increase in number with age and sun exposure. Some in sun-protected sites may fade and disappear.

## 2025-07-16 NOTE — TELEPHONE ENCOUNTER
Patients GI provider:  Dr. Olvera/Dr. Rodriguez recently did procedures    Number to return call: [phone]    Reason for call: Pt calling very concerned stating that she realized after the fact that she was not due for a colonoscopy until 7/14/2029. Per pt she had procedures done yesterday w/ Dr. Rodriguez.  Per pt when she was scheduled for ERCP she was asked if she would like the colonoscopy done at the same since she is due. Per pt if she would have known she wasn't due she would not have done it. Pt concerned that insurance will not cover it since she had it done before her due date. Pt also stating that if it was medically necessary then insurance will cover it. Pt would like the office to call on this as soon as possible please. Please reach out to pt, thank you.    Scheduled procedure/appointment date if applicable: N/A

## 2025-07-16 NOTE — PROGRESS NOTES
"[facility] Note     Patient Name: Alicia Rai  Encounter Date: 07/16/2025       Have you been cared for by a [facility] Dermatologist in the last 3 years and, if so, which description applies to you? Yes. I have been here within the last 3 years, and my medical history has NOT changed since that time. I am not of child-bearing potential.     REVIEW OF SYSTEMS:  Have you recently had or currently have any of the following? No changes in my recent health.   PAST MEDICAL HISTORY:  Have you personally ever had or currently have any of the following?  If \"YES,\" then please provide more detail. No changes in my medical history.   HISTORY OF IMMUNOSUPPRESSION: Do you have a history of any of the following:  Systemic Immunosuppression such as Diabetes, Biologic or Immunotherapy, Chemotherapy, Organ Transplantation, Bone Marrow Transplantation or Prednisone?  No     Answering \"YES\" requires the addition of the dotphrase \"IMMUNOSUPPRESSED\" as the first diagnosis of the patient's visit.   FAMILY HISTORY:  Any \"first degree relatives\" (parent, brother, sister, or child) with the following?    No changes in my family's known health.   PATIENT EXPERIENCE:    Do you want the Dermatologist to perform a COMPLETE skin exam today including a clinical examination under the \"bra and underwear\" areas?  Yes  If necessary, do we have your permission to call and leave a detailed message on your Preferred Phone number that includes your specific medical information?  Yes      Allergies[1] Current Medications[2]        Whom besides the patient is providing clinical information about today's encounter?   NO ADDITIONAL HISTORIAN (patient alone provided history)    Physical Exam and Assessment/Plan by Diagnosis: Patient last evaluated by Dr. Blood on 8/28/23.  Denies any personal or family history of skin cancer.    MELANOCYTIC NEVI (\"Moles\")    Physical Exam:  Anatomic Location Affected: Mostly on sun-exposed areas of " "the face, trunk and extremities.   Morphological Description:  Scattered, 1-4mm round to ovoid, symmetrical-appearing, even bordered, skin colored to dark brown macules/papules, mostly in sun-exposed areas  Pertinent Positives:  Pertinent Negatives:    Additional History of Present Condition:  Present on exam.     Assessment and Plan:  Based on a thorough discussion of this condition and the management approach to it (including a comprehensive discussion of the known risks, side effects and potential benefits of treatment), the patient (family) agrees to implement the following specific plan:  Provided handout with information regarding the ABCDE's of moles   Recommend routine skin exams every year.    Sun avoidance, protective clothing (known as UPF clothing), and the use of at least SPF 30 sunscreens is advised. Sunscreen should be reapplied every two hours when outside.     SEBORRHEIC KERATOSIS; NON-INFLAMED    Physical Exam:  Anatomic Location Affected:  scattered across trunk, extremities, face  Morphological Description:  Flat and raised, waxy, smooth to warty textured, yellow to brownish-grey to dark brown to blackish, discrete, \"stuck-on\" appearing papules.  Pertinent Positives:  Pertinent Negatives:    Additional History of Present Condition:  Patient reports new bumps on the skin.  Denies itch, burn, pain, bleeding or ulceration.  Present constantly; nothing seems to make it worse or better.  No prior treatment.      Assessment and Plan:  Based on a thorough discussion of this condition and the management approach to it (including a comprehensive discussion of the known risks, side effects and potential benefits of treatment), the patient (family) agrees to implement the following specific plan:  Reassured benign    ANGIOMA (\"CHERRY ANGIOMA\")    Physical Exam:  Anatomic Location: scattered across sun exposed areas of the trunk and extremities   Morphologic Description: Firm red to reddish-blue discrete " papules  Pertinent Positives:  Pertinent Negatives:    Additional History of Present Condition:  Present on exam.     Assessment and Plan:  Reassured benign    LENTIGO    Physical Exam:  Anatomic Location Affected:  trunk, arms  Morphological Description:  Light brown macules  Pertinent Positives:  Pertinent Negatives:    Additional History of Present Condition:  Present on exam    Assessment and Plan:  Based on a thorough discussion of this condition and the management approach to it (including a comprehensive discussion of the known risks, side effects and potential benefits of treatment), the patient (family) agrees to implement the following specific plan:  When outside we recommend using a wide brim hat, sunglasses, long sleeve and pants, sunscreen with SPF 30+ with reapplication every 2 hours, or SPF specific clothing     MILIUM      Physical Exam:  Anatomic Location Affected:  Face  Morphological Description:  2 mm White papules  Pertinent Positives:  Pertinent Negatives:     Additional History of Present Condition:  Present on exam     Assessment and Plan:  Based on a thorough discussion of this condition and the management approach to it (including a comprehensive discussion of the known risks, side effects and potential benefits of treatment), the patient (family) agrees to implement the following specific plan:  Benign and reassured  Discussed cosmetic removal, but defers at this time    Scribe Attestation      I,:  Kerri Fiore MA am acting as a scribe while in the presence of the attending physician.:       I,:  CHLOÉ Garcia personally performed the services described in this documentation    as scribed in my presence.:                [1] No Known Allergies  [2]   Current Outpatient Medications:     ascorbic acid (VITAMIN C) 500 MG tablet, Take 500 mg by mouth daily, Disp: , Rfl:     calcium carbonate (OS-CARTER) 600 MG tablet, Take 600 mg by mouth in the morning., Disp: , Rfl:      Cholecalciferol (VITAMIN D3) 1000 units CAPS, Take by mouth in the morning, Disp: , Rfl:     COLLAGEN PO, Take 5,000 mcg by mouth in the morning., Disp: , Rfl:     folic acid (FOLVITE) 1 mg tablet, Take by mouth in the morning., Disp: , Rfl:     VITAMIN K PO, Take by mouth, Disp: , Rfl:   No current facility-administered medications for this visit.

## 2025-07-17 NOTE — TELEPHONE ENCOUNTER
Agree it looks like she was due for a 5 year recall in 2029 and this is also noted on her pathology note from Dr. Olvera as well. Not sure if you want to check with Dr. Olvera if there was some kind of other discussion or problem that is not documented to make sure?

## 2025-07-21 PROCEDURE — 88305 TISSUE EXAM BY PATHOLOGIST: CPT | Performed by: PATHOLOGY

## 2025-07-25 ENCOUNTER — RESULTS FOLLOW-UP (OUTPATIENT)
Dept: GASTROENTEROLOGY | Facility: CLINIC | Age: 70
End: 2025-07-25

## 2025-08-04 ENCOUNTER — HOSPITAL ENCOUNTER (OUTPATIENT)
Dept: MRI IMAGING | Facility: CLINIC | Age: 70
Discharge: HOME | End: 2025-08-04
Attending: SURGERY
Payer: MEDICARE

## 2025-08-04 DIAGNOSIS — D13.5 AMPULLARY ADENOMA: ICD-10-CM

## 2025-08-04 PROCEDURE — 74183 MRI ABD W/O CNTR FLWD CNTR: CPT

## 2025-08-04 PROCEDURE — A9585 GADOBUTROL INJECTION: HCPCS | Performed by: SURGERY

## 2025-08-04 RX ORDER — GADOBUTROL 604.72 MG/ML
5 INJECTION INTRAVENOUS
Status: COMPLETED | OUTPATIENT
Start: 2025-08-04 | End: 2025-08-04

## 2025-08-04 RX ADMIN — GADOBUTROL 5 ML: 604.72 INJECTION INTRAVENOUS at 13:22

## 2025-08-07 ENCOUNTER — TELEPHONE (OUTPATIENT)
Dept: GASTROENTEROLOGY | Facility: CLINIC | Age: 70
End: 2025-08-07

## (undated) DEVICE — SUT VICRYL 2-0 SH 27 IN UNDYED J417H

## (undated) DEVICE — TUBING SUCTION 5MM X 12 FT

## (undated) DEVICE — DRAPE EQUIPMENT RF WAND

## (undated) DEVICE — LIGHT HANDLE COVER SLEEVE DISP BLUE STELLAR

## (undated) DEVICE — BETHLEHEM UNIVERSAL MINOR GEN: Brand: CARDINAL HEALTH

## (undated) DEVICE — GLOVE INDICATOR PI UNDERGLOVE SZ 7.5 BLUE

## (undated) DEVICE — SUT MONOCRYL 3-0 PS-2 18 IN Y497G

## (undated) DEVICE — POV-IOD SOLUTION 4OZ BT

## (undated) DEVICE — GLOVE SRG BIOGEL ORTHOPEDIC 7.5

## (undated) DEVICE — ADHESIVE SKIN CLOSURE SYS EXOFIN FUSION 22CM

## (undated) DEVICE — SUT VICRYL 3-0 SH 27 IN J416H

## (undated) DEVICE — ELECTRODE NEEDLE E-Z CLEAN 2.75IN 7CM -0013

## (undated) DEVICE — PENCIL SMOKE EVAC TELESCOPING W/TUBING

## (undated) DEVICE — DRAPE SHEET THREE QUARTER

## (undated) DEVICE — SHEATH, GUIDE, SAVI SCOUT®: Brand: SAVI SCOUT®

## (undated) DEVICE — PACK UNIVERSAL DRAPES SUB-Q ICD

## (undated) DEVICE — 4-PORT MANIFOLD: Brand: NEPTUNE 2

## (undated) DEVICE — MAYO STAND COVER: Brand: CONVERTORS

## (undated) DEVICE — INTENDED FOR TISSUE SEPARATION, AND OTHER PROCEDURES THAT REQUIRE A SHARP SURGICAL BLADE TO PUNCTURE OR CUT.: Brand: BARD-PARKER SAFETY BLADES SIZE 15, STERILE

## (undated) DEVICE — SUT SILK 2-0 SH 30 IN K833H